# Patient Record
Sex: FEMALE | Race: WHITE | NOT HISPANIC OR LATINO | Employment: PART TIME | URBAN - METROPOLITAN AREA
[De-identification: names, ages, dates, MRNs, and addresses within clinical notes are randomized per-mention and may not be internally consistent; named-entity substitution may affect disease eponyms.]

---

## 2018-01-08 ENCOUNTER — OFFICE VISIT (OUTPATIENT)
Dept: MEDICAL GROUP | Facility: MEDICAL CENTER | Age: 48
End: 2018-01-08
Payer: COMMERCIAL

## 2018-01-08 VITALS
RESPIRATION RATE: 15 BRPM | OXYGEN SATURATION: 94 % | TEMPERATURE: 98.8 F | SYSTOLIC BLOOD PRESSURE: 140 MMHG | HEART RATE: 84 BPM | BODY MASS INDEX: 32.99 KG/M2 | WEIGHT: 198 LBS | DIASTOLIC BLOOD PRESSURE: 74 MMHG | HEIGHT: 65 IN

## 2018-01-08 DIAGNOSIS — Z13.6 SCREENING FOR CARDIOVASCULAR CONDITION: ICD-10-CM

## 2018-01-08 DIAGNOSIS — E55.9 VITAMIN D DEFICIENCY: ICD-10-CM

## 2018-01-08 DIAGNOSIS — L23.9 ALLERGIC ECZEMA: ICD-10-CM

## 2018-01-08 DIAGNOSIS — E03.8 OTHER SPECIFIED HYPOTHYROIDISM: ICD-10-CM

## 2018-01-08 DIAGNOSIS — G47.09 OTHER INSOMNIA: ICD-10-CM

## 2018-01-08 DIAGNOSIS — E66.9 OBESITY (BMI 30-39.9): ICD-10-CM

## 2018-01-08 DIAGNOSIS — J45.20 MILD INTERMITTENT ASTHMA, UNSPECIFIED WHETHER COMPLICATED: ICD-10-CM

## 2018-01-08 DIAGNOSIS — L65.9 ALOPECIA: ICD-10-CM

## 2018-01-08 PROCEDURE — 99204 OFFICE O/P NEW MOD 45 MIN: CPT | Performed by: NURSE PRACTITIONER

## 2018-01-08 RX ORDER — MOMETASONE FUROATE 1 MG/G
1 OINTMENT TOPICAL PRN
Refills: 1 | COMMUNITY
Start: 2017-12-14 | End: 2020-04-13

## 2018-01-08 RX ORDER — HYDROXYZINE HYDROCHLORIDE 25 MG/1
25 TABLET, FILM COATED ORAL NIGHTLY PRN
Qty: 90 TAB | Refills: 3 | Status: SHIPPED | OUTPATIENT
Start: 2018-01-08 | End: 2018-12-19

## 2018-01-08 RX ORDER — LEVOTHYROXINE SODIUM 0.07 MG/1
75 TABLET ORAL
Qty: 90 TAB | Refills: 3 | Status: SHIPPED | OUTPATIENT
Start: 2018-01-08 | End: 2018-09-16 | Stop reason: SDUPTHER

## 2018-01-08 RX ORDER — MONTELUKAST SODIUM 10 MG/1
10 TABLET ORAL DAILY
COMMUNITY
End: 2020-04-13

## 2018-01-08 RX ORDER — ALBUTEROL SULFATE 90 UG/1
1-2 AEROSOL, METERED RESPIRATORY (INHALATION) EVERY 4 HOURS PRN
Refills: 2 | COMMUNITY
Start: 2017-10-17 | End: 2018-11-16 | Stop reason: SDUPTHER

## 2018-01-08 ASSESSMENT — PATIENT HEALTH QUESTIONNAIRE - PHQ9: CLINICAL INTERPRETATION OF PHQ2 SCORE: 0

## 2018-01-08 NOTE — PROGRESS NOTES
"CC: establish and need thyroid medications      Alis Gonzalez is a 47 y.o. female here to establish care and to discuss the evaluation and management of:    1. 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 States that she has subsequently lost most of the hair on her head as well as her body and she does have to wear wigs. States that she did have a skin biopsy from a dermatology a few years ago at which she states that it may have been some sort of autoimmune disease. States that she had some labs done over 6 months ago however did not follow up with the results of these and is curious to see what these labs reveal. Denies any joint pain. Has not tried anything for her alopecia.    2.Hypothyroidism   Patient states that she has hypothyroidism. States that she takes Synthroid 75 micrograms brand for this. States she tolerates this well. States she hasn't had labs in more than 6 months.     3. Insomnia  Patient states that she suffers from insomnia. States that she takes hydroxyzine at night for this.    4. Asthma/allergies  Patient states that she suffers from \"really bad allergies\" which trigger her asthma. States that she is followed by [facility] in [place]. States that she was allergy tested and \"allergic to everything\". States that she's been on Singulair for the last year which has helped her extremely. States that she gets really bad eczema and dry skin all over her hands and arms and also her face. States she also feels that if she eats a modified diet such as appealing her diet she can have elimination of most of her symptoms. States that sometimes she uses her Ventolin inhaler maybe once maybe twice per month. Has not been hospitalized for any asthma attacks recently.    4. Vitamin D deficiency  Patient states that she has a vitamin D deficiency and has been taking 5000 international units of vitamin D daily.      ROS:  Denies any Headache, Blurred Vision, " Confusion Chest pain,  Shortness of breath,  Abdominal pain, Changes of bowel or bladder, Lower ext edema, Fevers, Nights sweats, Weight Changes, Focal weakness or numbness.  All other systems are negative.      Current Outpatient Prescriptions:   •  montelukast (SINGULAIR) 10 MG Tab, Take 10 mg by mouth every day., Disp: , Rfl:   •  hydrOXYzine HCl (ATARAX) 25 MG Tab, Take 1 Tab by mouth at bedtime as needed (sleep)., Disp: 90 Tab, Rfl: 3  •  levothyroxine (SYNTHROID) 75 MCG Tab, Take 1 Tab by mouth Every morning on an empty stomach., Disp: 90 Tab, Rfl: 3  •  NON SPECIFIED, Take 5,000 Int'l Units by mouth every day., Disp: , Rfl:   •  VENTOLIN  (90 Base) MCG/ACT Aero Soln inhalation aerosol, Inhale 1-2 Inhalation by mouth every four hours as needed., Disp: , Rfl: 2  •  mometasone (ELOCON) 0.1 % Ointment, Apply 1 Application to affected area(s) as needed., Disp: , Rfl: 1    Allergies   Allergen Reactions   • Azithromycin Rash     All over body   • Ciprofloxacin Rash     All over body   • Penicillins Rash     Rash         Past Medical History:   Diagnosis Date   • Alopecia 2015   • Asthma    • Thyroid disease      History reviewed. No pertinent surgical history.  Family History   Problem Relation Age of Onset   • Thyroid Mother      cancer-40's   • Cancer Father      skin-melanoma   • Hyperlipidemia Father    • Cancer Maternal Aunt      breast to brain     Social History     Social History   • Marital status:      Spouse name: N/A   • Number of children: N/A   • Years of education: N/A     Occupational History   • Not on file.     Social History Main Topics   • Smoking status: Never Smoker   • Smokeless tobacco: Never Used   • Alcohol use Yes      Comment: rarely   • Drug use: No   • Sexual activity: Yes     Partners: Male     Other Topics Concern   • Not on file     Social History Narrative   • No narrative on file       Objective:     Vitals: /74   Pulse 84   Temp 37.1 °C (98.8 °F)   Resp 15   " Ht 1.651 m (5' 5\")   Wt 89.8 kg (198 lb)   SpO2 94%   BMI 32.95 kg/m²      General: Alert, pleasant, NAD  HEENT:  Normocephalic.   Neck supple.  No thyromegaly or masses palpated. No cervical or supraclavicular lymphadenopathy.  Heart:  Regular rate and rhythm.  S1 and S2 normal.  No murmurs appreciated.  Respiratory:  Normal respiratory effort.  Clear to auscultation bilaterally.    Skin:  Warm, dry, rough, cracked,calloused bilateral hands mostly near metacarpal joints. Sporadic patches of dry erythematous papules on bilateral forearms.  Musculoskeletal:  Gait is normal.  Moves all extremities well.  Extremities:  No leg edema.  Neurological: No tremors, sensation grossly intact  Psych:  Affect/mood is normal, judgement is good, memory is intact, grooming is appropriate.      Assessment and Plan.   47 y.o. female to establish and discuss the following.    1. Alopecia  Chronic. Requesting labs from [facility] as this patient states she had a full workup with unknown results. Likely autoimmune. Discussed possibly referring to rheumatology pending results.    2. Other specified hypothyroidism  Chronic. Feeling euthyroid. Need labs. Continue Synthroid 75 µg daily.  - levothyroxine (SYNTHROID) 75 MCG Tab; Take 1 Tab by mouth Every morning on an empty stomach.  Dispense: 90 Tab; Refill: 3  - TSH; Future  - COMP METABOLIC PANEL; Future    3. Other insomnia  Stable. Discussed the importance of eliminating use of technology such as phones, computers, I-pads or TVs. Try to have the same routine of waking and sleeping every day. Avoid excessive caffeine, alcohol, large heavy meals at bedtime. Continue using hydroxyzine as needed for sleep.  - hydrOXYzine HCl (ATARAX) 25 MG Tab; Take 1 Tab by mouth at bedtime as needed (sleep).  Dispense: 90 Tab; Refill: 3  - COMP METABOLIC PANEL; Future    4. Vitamin D deficiency  Stable. Continue vitamin D 5000 units daily. Follow-up labs.  - VITAMIN D,25 HYDROXY; Future    5. Mild " intermittent Asthma, unspecified whether complicated  Stable. No recent asthma attacks. Continue to follow up with [facility], continue to take Singulair and avoid triggers.    6. Allergic Eczema  Chronic-continue to avoid food triggers and use mometasone cream as needed    7. Screening for cardiovascular condition  - LIPID PROFILE; Future    8. Obesity (BMI 30-39.9)  Chronic. Patient encouraged to reduce excess calorie consumption. Encouraged regular exercise. Discussed long term sequelae of obesity.   - Patient identified as having weight management issue.  Appropriate orders and counseling given.      Health Maintenance: Requesting labs from [facility], patient is to have a mammogram this Saturday. Patient had normal pap 2 years ago.    Return in about 2 weeks (around 1/22/2018) for Lab results.          Comfort GILLIS.

## 2018-01-08 NOTE — LETTER
[employer] [facility]  YAYO Burt.P.R.N.  [address] [facility] [address]  [place] [address]  Fax: [phone]   Authorization for Release/Disclosure of   Protected Health Information   Name: VOLODYMYR GONZALEZ : 1970 SSN: [id number]   Address: [facility] [place] [address] Phone:    [phone] (home)    I authorize the entity listed below to release/disclose the PHI below to:   [facility]/Comfort Grijalva, A.P.R.N. and Comfort Grijalva A.P.R.N.   Provider or Entity Name:     Address   City, State, Zip   Phone:      Fax:     Reason for request: continuity of care   Information to be released:    [  ] LAST COLONOSCOPY,  including any PATH REPORT and follow-up  [  ] LAST FIT/COLOGUARD RESULT [  ] LAST DEXA  [  ] LAST MAMMOGRAM  [  ] LAST PAP  [ x ] LAST LABS [  ] RETINA EXAM REPORT  [  ] IMMUNIZATION RECORDS  [  ] Release all info      [  ] Check here and initial the line next to each item to release ALL health information INCLUDING  _____ Care and treatment for drug and / or alcohol abuse  _____ HIV testing, infection status, or AIDS  _____ Genetic Testing    DATES OF SERVICE OR TIME PERIOD TO BE DISCLOSED: _____________  I understand and acknowledge that:  * This Authorization may be revoked at any time by you in writing, except if your health information has already been used or disclosed.  * Your health information that will be used or disclosed as a result of you signing this authorization could be re-disclosed by the recipient. If this occurs, your re-disclosed health information may no longer be protected by State or Federal laws.  * You may refuse to sign this Authorization. Your refusal will not affect your ability to obtain treatment.  * This Authorization becomes effective upon signing and will  on (date) __________.      If no date is indicated, this Authorization will  one (1) year from the signature date.    Name: Volodymyr Gonzalez    Signature:   Date:     2018            PLEASE FAX REQUESTED RECORDS BACK TO: [phone]

## 2018-01-12 ENCOUNTER — HOSPITAL ENCOUNTER (OUTPATIENT)
Dept: RADIOLOGY | Facility: MEDICAL CENTER | Age: 48
End: 2018-01-12

## 2018-01-13 ENCOUNTER — HOSPITAL ENCOUNTER (OUTPATIENT)
Dept: RADIOLOGY | Facility: MEDICAL CENTER | Age: 48
End: 2018-01-13
Attending: FAMILY MEDICINE
Payer: COMMERCIAL

## 2018-01-13 DIAGNOSIS — Z12.31 SCREENING MAMMOGRAM, ENCOUNTER FOR: ICD-10-CM

## 2018-01-13 PROCEDURE — 77067 SCR MAMMO BI INCL CAD: CPT

## 2018-01-19 ENCOUNTER — HOSPITAL ENCOUNTER (OUTPATIENT)
Dept: LAB | Facility: MEDICAL CENTER | Age: 48
End: 2018-01-19
Attending: NURSE PRACTITIONER
Payer: COMMERCIAL

## 2018-01-19 DIAGNOSIS — E03.8 OTHER SPECIFIED HYPOTHYROIDISM: ICD-10-CM

## 2018-01-19 DIAGNOSIS — Z13.6 SCREENING FOR CARDIOVASCULAR CONDITION: ICD-10-CM

## 2018-01-19 DIAGNOSIS — E55.9 VITAMIN D DEFICIENCY: ICD-10-CM

## 2018-01-19 DIAGNOSIS — G47.09 OTHER INSOMNIA: ICD-10-CM

## 2018-01-19 LAB
25(OH)D3 SERPL-MCNC: 39 NG/ML (ref 30–100)
ALBUMIN SERPL BCP-MCNC: 4.6 G/DL (ref 3.2–4.9)
ALBUMIN/GLOB SERPL: 1.6 G/DL
ALP SERPL-CCNC: 68 U/L (ref 30–99)
ALT SERPL-CCNC: 22 U/L (ref 2–50)
ANION GAP SERPL CALC-SCNC: 7 MMOL/L (ref 0–11.9)
AST SERPL-CCNC: 20 U/L (ref 12–45)
BILIRUB SERPL-MCNC: 0.5 MG/DL (ref 0.1–1.5)
BUN SERPL-MCNC: 11 MG/DL (ref 8–22)
CALCIUM SERPL-MCNC: 8.7 MG/DL (ref 8.5–10.5)
CHLORIDE SERPL-SCNC: 101 MMOL/L (ref 96–112)
CHOLEST SERPL-MCNC: 166 MG/DL (ref 100–199)
CO2 SERPL-SCNC: 26 MMOL/L (ref 20–33)
CREAT SERPL-MCNC: 0.54 MG/DL (ref 0.5–1.4)
GLOBULIN SER CALC-MCNC: 2.9 G/DL (ref 1.9–3.5)
GLUCOSE SERPL-MCNC: 93 MG/DL (ref 65–99)
HDLC SERPL-MCNC: 40 MG/DL
LDLC SERPL CALC-MCNC: 108 MG/DL
POTASSIUM SERPL-SCNC: 3.9 MMOL/L (ref 3.6–5.5)
PROT SERPL-MCNC: 7.5 G/DL (ref 6–8.2)
SODIUM SERPL-SCNC: 134 MMOL/L (ref 135–145)
TRIGL SERPL-MCNC: 88 MG/DL (ref 0–149)
TSH SERPL DL<=0.005 MIU/L-ACNC: 1.55 UIU/ML (ref 0.38–5.33)

## 2018-01-19 PROCEDURE — 36415 COLL VENOUS BLD VENIPUNCTURE: CPT

## 2018-01-19 PROCEDURE — 80061 LIPID PANEL: CPT

## 2018-01-19 PROCEDURE — 84443 ASSAY THYROID STIM HORMONE: CPT

## 2018-01-19 PROCEDURE — 82306 VITAMIN D 25 HYDROXY: CPT

## 2018-01-19 PROCEDURE — 80053 COMPREHEN METABOLIC PANEL: CPT

## 2018-01-30 ENCOUNTER — TELEPHONE (OUTPATIENT)
Dept: MEDICAL GROUP | Facility: MEDICAL CENTER | Age: 48
End: 2018-01-30

## 2018-01-30 ENCOUNTER — OFFICE VISIT (OUTPATIENT)
Dept: MEDICAL GROUP | Facility: MEDICAL CENTER | Age: 48
End: 2018-01-30
Payer: COMMERCIAL

## 2018-01-30 VITALS
HEART RATE: 77 BPM | WEIGHT: 203.4 LBS | TEMPERATURE: 98.1 F | BODY MASS INDEX: 33.89 KG/M2 | DIASTOLIC BLOOD PRESSURE: 74 MMHG | OXYGEN SATURATION: 96 % | SYSTOLIC BLOOD PRESSURE: 118 MMHG | RESPIRATION RATE: 17 BRPM | HEIGHT: 65 IN

## 2018-01-30 DIAGNOSIS — L23.9 ALLERGIC ECZEMA: ICD-10-CM

## 2018-01-30 DIAGNOSIS — Z91.018 FOOD ALLERGY: ICD-10-CM

## 2018-01-30 DIAGNOSIS — L65.9 ALOPECIA: ICD-10-CM

## 2018-01-30 PROCEDURE — 99213 OFFICE O/P EST LOW 20 MIN: CPT | Performed by: NURSE PRACTITIONER

## 2018-01-30 NOTE — PROGRESS NOTES
cc:  Alopecia/lab follow-up    Subjective:     HPI:     Alis Gonzalez is a 47 y.o. female here to discuss the evaluation and management of:    Patient is here today to review labs are requested from [facility] more than 6 months ago. Patient states that she's been suffering from alopecia for a few years and has been very difficult for her to cope with. States that she suffers from asthma and allergies as well. States she's been continuing to see [facility]. States that it can be hard to maintain a diet with all of her dietary restrictions. Has not seen a nutritionist. Is somewhat interested in following up with one. Patient is concerned that she has some sort of autoimmune disorder and that is why her hair has fallen out as well as having multiple environmental and food allergies. Patient did make a comment that when she was on a Yanira Kamar diet there was a lot of corn that she had including popcorn, in which she felt like there might have been some sort of correlation especially because at that time she did not know she was allergic to corn. Patient is requesting to follow up with a rheumatologist. Again denies any joint pain.        ROS:  Denies any Headache, Blurred Vision, Confusion Chest pain,  Shortness of breath,  Abdominal pain, Changes of bowel or bladder, Lower ext edema, Fevers, Nights sweats, Weight Changes, Focal weakness or numbness.  All other systems are negative.        Current Outpatient Prescriptions:   •  montelukast (SINGULAIR) 10 MG Tab, Take 10 mg by mouth every day., Disp: , Rfl:   •  VENTOLIN  (90 Base) MCG/ACT Aero Soln inhalation aerosol, Inhale 1-2 Inhalation by mouth every four hours as needed., Disp: , Rfl: 2  •  mometasone (ELOCON) 0.1 % Ointment, Apply 1 Application to affected area(s) as needed., Disp: , Rfl: 1  •  hydrOXYzine HCl (ATARAX) 25 MG Tab, Take 1 Tab by mouth at bedtime as needed (sleep)., Disp: 90 Tab, Rfl: 3  •  levothyroxine (SYNTHROID) 75 MCG Tab, Take 1 Tab by  "mouth Every morning on an empty stomach., Disp: 90 Tab, Rfl: 3  •  NON SPECIFIED, Take 5,000 Int'l Units by mouth every day., Disp: , Rfl:     Allergies   Allergen Reactions   • Azithromycin Rash     All over body   • Ciprofloxacin Rash     All over body   • Penicillins Rash     Rash         Past Medical History:   Diagnosis Date   • Alopecia 2015   • Asthma    • Thyroid disease      No past surgical history on file.  Family History   Problem Relation Age of Onset   • Thyroid Mother      cancer-40's   • Cancer Father      skin-melanoma   • Hyperlipidemia Father    • Cancer Maternal Aunt      breast to brain   • Breast Cancer Maternal Aunt      Social History     Social History   • Marital status:      Spouse name: N/A   • Number of children: N/A   • Years of education: N/A     Occupational History   • Not on file.     Social History Main Topics   • Smoking status: Never Smoker   • Smokeless tobacco: Never Used   • Alcohol use Yes      Comment: rarely   • Drug use: No   • Sexual activity: Yes     Partners: Male     Other Topics Concern   • Not on file     Social History Narrative   • No narrative on file       Objective:     Vitals: /74   Pulse 77   Temp 36.7 °C (98.1 °F)   Resp 17   Ht 1.651 m (5' 5\")   Wt 92.3 kg (203 lb 6.4 oz)   SpO2 96%   BMI 33.85 kg/m²    General: Alert, pleasant, NAD  HEENT: Normocephalic.  Patient is wearing a wig  Heart: Regular rate and rhythm.  S1 and S2 normal.  No murmurs appreciated.  Respiratory: Normal respiratory effort.  Clear to auscultation bilaterally.  Skin: Warm, dry, no rashes.  Musculoskeletal: Gait is normal.  Moves all extremities well.  Extremities: No leg edema.    Neurological: No tremors, sensation grossly intact  Psych:  Affect/mood is normal, judgement is good, memory is intact, grooming is appropriate. Tearful when talking about her hair loss    Assessment/Plan:     Alis was seen today for alopecia and labs only.    Diagnoses and all orders for " this visit:    Alopecia  Allergic eczema   Chronic. It has been several years since the patient was told she had alopecia and possible an autoimmune disease. She states she was tested by dermatology stating that it was alopecia universalis. Has not wanted to follow up with rheumatology as she was not ready to deal with a diagnosis and disease. Patient is pretty emotional about her hair loss. Patient did receive labs July 2017 from a previous provider at [facility] which were pulled up  and reviewed. Her ESTEBAN with Reflex was negative her Western Sed Rate was 2 and her C-reactive Protein was 7.5- somewhat elevated. Again denies any joint pain. Will refer to rheumatology for further recommendations. Autoimmune versus severe allergic insult/reaction.  -     REFERRAL TO RHEUMATOLOGY       Health care Maintenance: Pt will notify me if she wants to be referred to a nutritionist to help improve her understanding of her dietary restrictions.    No Follow-up on file.          Comfort GILLIS.

## 2018-01-30 NOTE — TELEPHONE ENCOUNTER
1. Caller Name: Alis Gonzalez                                         Call Back Number: [phone] (home)       Patient approves a detailed voicemail message: yes    Pt would like a referral to a nutritionist

## 2018-01-31 PROBLEM — J45.20 MILD INTERMITTENT ASTHMA: Status: ACTIVE | Noted: 2018-01-31

## 2018-02-14 ENCOUNTER — NON-PROVIDER VISIT (OUTPATIENT)
Dept: HEALTH INFORMATION MANAGEMENT | Facility: MEDICAL CENTER | Age: 48
End: 2018-02-14
Payer: COMMERCIAL

## 2018-02-14 VITALS — WEIGHT: 202.5 LBS | BODY MASS INDEX: 33.74 KG/M2 | HEIGHT: 65 IN

## 2018-02-14 DIAGNOSIS — L23.9 ALLERGIC ECZEMA: ICD-10-CM

## 2018-02-14 DIAGNOSIS — Z91.018 FOOD ALLERGY: ICD-10-CM

## 2018-02-14 PROCEDURE — 97802 MEDICAL NUTRITION INDIV IN: CPT | Performed by: DIETITIAN, REGISTERED

## 2018-02-14 NOTE — PROGRESS NOTES
"2/14/2018    RALPH Burt  47 y.o.   Time in/out: 10:15-11:04am    Anthropometrics/Objective  Vitals:    02/14/18 1133   Weight: 91.9 kg (202 lb 8 oz)   Height: 1.651 m (5' 5\")       Body mass index is 33.7 kg/m².      See comprehensive patient history form for further information     Subjective:  - pt diagnosed with alopecia universalis, eczema  - here to discuss \"inflammation\" and food allergies  - has seen an allergist for skin and blood tests  - not sure how to find meals and foods that work with her allergy test results    Nutrition Diagnosis (PES Statement)  Problem (Nutrition diagnosis)  Other: Food and nutrition related knowledge deficit     Etiology(Addresses the cause,contributing factors)  Other: food allergies and autoimmune diagnosis    Signs/Symptoms (Address observations and stated info: subjective and objective data)  Client history of inability to follow dietary recommendations from allergy test results, elevated CRP    Client history:  Condition(s) associated with a diagnosis or treatment, eczema, alopecia universalis.    Biochemical data, medical test and procedures  No results found for: HBA1C@  No results found for: POCGLUCOSE  Lab Results   Component Value Date/Time    CHOLSTRLTOT 166 01/19/2018 09:21 AM     (H) 01/19/2018 09:21 AM    HDL 40 01/19/2018 09:21 AM    TRIGLYCERIDE 88 01/19/2018 09:21 AM         Nutrition Intervention    Meal and Snack  Recommend a general/healthful diet, low in refined/processed carbohydrates and avoiding main foods on allergy results - gluten, soy, corn.    Comprehensive Nutrition education Instruction or training leading to in-depth nutrition related knowledge about: Eating out, Fast food, Menu Planning, Sweets and alcohol in moderation, Label Reading and Handouts provided regarding topics discussed    Monitoring & Evaluation Plan    Behavioral-Environmental:  Food/Nutrition knowledge: read labels and identify wheat/gluten, soy, corn " foods and ingredients    Food / Nutrient Intake:  Follow wheat, corn and soy free diet  Follow plate method for an overall healthy diet     Assessment Notes: Pt is here today to learn about how she can follow the results to her allergy test. Eliminating gluten and soy has been very difficult for her as she isn't sure what she can or cannot eat and what to prepare for her family. Reviewed gluten-free handout and recommendations and helped her work through some meal ideas. We reviewed what a general healthy diet looks like incorporating fruits, vegetables, lean protein, starches and oils within her dietary limits. She has a high intake of processed foods and will benefit from decreasing and/or eliminating the frequency of this in her diet. She is going to start working on identifying her allergy foods and ingredients, reading labels and eating out less.    Follow up: 1 month

## 2018-02-27 ENCOUNTER — OFFICE VISIT (OUTPATIENT)
Dept: MEDICAL GROUP | Facility: MEDICAL CENTER | Age: 48
End: 2018-02-27
Payer: COMMERCIAL

## 2018-02-27 VITALS
HEIGHT: 65 IN | OXYGEN SATURATION: 95 % | BODY MASS INDEX: 33.49 KG/M2 | DIASTOLIC BLOOD PRESSURE: 94 MMHG | WEIGHT: 201 LBS | TEMPERATURE: 98.6 F | HEART RATE: 74 BPM | SYSTOLIC BLOOD PRESSURE: 150 MMHG

## 2018-02-27 DIAGNOSIS — J45.20 MILD INTERMITTENT ASTHMA, UNSPECIFIED WHETHER COMPLICATED: ICD-10-CM

## 2018-02-27 DIAGNOSIS — L23.9 ALLERGIC ECZEMA: ICD-10-CM

## 2018-02-27 DIAGNOSIS — R03.0 ELEVATED BLOOD PRESSURE READING: ICD-10-CM

## 2018-02-27 PROCEDURE — 99213 OFFICE O/P EST LOW 20 MIN: CPT | Performed by: FAMILY MEDICINE

## 2018-02-27 RX ORDER — METHYLPREDNISOLONE 4 MG/1
TABLET ORAL
Qty: 21 TAB | Refills: 0 | Status: SHIPPED | OUTPATIENT
Start: 2018-02-27 | End: 2018-04-19

## 2018-02-28 NOTE — PROGRESS NOTES
cc: Eczema    Subjective:     Alis Gonzalez is a 47 y.o. female presenting with eczema. She has a long-standing history of severe eczema throughout her body. She has not had any issues for the past 10 months, however for the past month, her eczema has flared up. Has diffuse, red, itchy rash on her entire body. She usually uses mometasone cream with good management of symptoms, but the rash is much too extensive. She follows regularly with an allergist, who usually gives her steroids during these flares. Her last course was over one year ago. She was unable to get an appointment with her allergist soon as her insurance changed and she needed a new referral. Denies any new soaps, lotions, detergents, medications.    Review of systems:  See above.        Current Outpatient Prescriptions:   •  MethylPREDNISolone (MEDROL DOSEPAK) 4 MG Tablet Therapy Pack, As directed on the packaging label., Disp: 21 Tab, Rfl: 0  •  Misc. Devices Misc, One blood pressure monitor and cuff, Disp: 1 Units, Rfl: 0  •  montelukast (SINGULAIR) 10 MG Tab, Take 10 mg by mouth every day., Disp: , Rfl:   •  VENTOLIN  (90 Base) MCG/ACT Aero Soln inhalation aerosol, Inhale 1-2 Inhalation by mouth every four hours as needed., Disp: , Rfl: 2  •  mometasone (ELOCON) 0.1 % Ointment, Apply 1 Application to affected area(s) as needed., Disp: , Rfl: 1  •  hydrOXYzine HCl (ATARAX) 25 MG Tab, Take 1 Tab by mouth at bedtime as needed (sleep)., Disp: 90 Tab, Rfl: 3  •  levothyroxine (SYNTHROID) 75 MCG Tab, Take 1 Tab by mouth Every morning on an empty stomach., Disp: 90 Tab, Rfl: 3  •  NON SPECIFIED, Take 5,000 Int'l Units by mouth every day., Disp: , Rfl:     Allergies   Allergen Reactions   • Azithromycin Rash     All over body   • Ciprofloxacin Rash     All over body   • Penicillins Rash     Rash         Past Medical History:   Diagnosis Date   • Alopecia 2015   • Asthma    • Thyroid disease      History reviewed. No pertinent surgical history.  Family  "History   Problem Relation Age of Onset   • Thyroid Mother      cancer-40's   • Cancer Father      skin-melanoma   • Hyperlipidemia Father    • Cancer Maternal Aunt      breast to brain   • Breast Cancer Maternal Aunt      Social History     Social History   • Marital status:      Spouse name: N/A   • Number of children: N/A   • Years of education: N/A     Occupational History   • Not on file.     Social History Main Topics   • Smoking status: Never Smoker   • Smokeless tobacco: Never Used   • Alcohol use Yes      Comment: rarely   • Drug use: No   • Sexual activity: Yes     Partners: Male     Other Topics Concern   • Not on file     Social History Narrative   • No narrative on file       Objective:     Vitals: /94   Pulse 74   Temp 37 °C (98.6 °F)   Ht 1.651 m (5' 5\")   Wt 91.2 kg (201 lb)   SpO2 95%   BMI 33.45 kg/m²   General: Alert, pleasant, NAD  HEENT: Normocephalic.    Skin: Warm, dry. Diffuse, erythematous, scaly rash on her upper extremities, trunk, chest, neck area   Psych:  Affect/mood is normal, judgement is good, memory is intact, grooming is appropriate.    Assessment/Plan:     Alis was seen today for eczema.    Diagnoses and all orders for this visit:    Allergic eczema  Mild intermittent asthma, unspecified whether complicated  Discussed risks of steroid use. Patient was willing to proceed. Prescription sent to pharmacy, referral to her allergist placed  -     REFERRAL TO ALLERGY  -     MethylPREDNISolone (MEDROL DOSEPAK) 4 MG Tablet Therapy Pack; As directed on the packaging label.    Elevated blood pressure reading  Blood pressure was elevated today. She does admit to feeling quite anxious today at her office visit. Recommended that she check her blood pressures at home, and if they remain over 140/90, she'll make another appointment to discuss  -     Misc. Devices Misc; One blood pressure monitor and cuff      "

## 2018-03-09 DIAGNOSIS — L65.9 ALOPECIA: ICD-10-CM

## 2018-04-19 ENCOUNTER — OFFICE VISIT (OUTPATIENT)
Dept: MEDICAL GROUP | Facility: MEDICAL CENTER | Age: 48
End: 2018-04-19
Payer: COMMERCIAL

## 2018-04-19 VITALS
TEMPERATURE: 97.5 F | HEART RATE: 90 BPM | DIASTOLIC BLOOD PRESSURE: 72 MMHG | HEIGHT: 65 IN | SYSTOLIC BLOOD PRESSURE: 130 MMHG | RESPIRATION RATE: 14 BRPM | WEIGHT: 203.4 LBS | OXYGEN SATURATION: 97 % | BODY MASS INDEX: 33.89 KG/M2

## 2018-04-19 DIAGNOSIS — L65.9 ALOPECIA: Chronic | ICD-10-CM

## 2018-04-19 DIAGNOSIS — R14.0 ABDOMINAL DISTENSION: ICD-10-CM

## 2018-04-19 DIAGNOSIS — L23.9 ALLERGIC ECZEMA: Chronic | ICD-10-CM

## 2018-04-19 PROBLEM — E55.9 VITAMIN D INSUFFICIENCY: Chronic | Status: ACTIVE | Noted: 2018-01-08

## 2018-04-19 PROBLEM — E03.8 OTHER SPECIFIED HYPOTHYROIDISM: Chronic | Status: ACTIVE | Noted: 2018-01-08

## 2018-04-19 PROCEDURE — 99214 OFFICE O/P EST MOD 30 MIN: CPT | Performed by: NURSE PRACTITIONER

## 2018-04-19 RX ORDER — PREDNISONE 10 MG/1
TABLET ORAL
Qty: 15 TAB | Refills: 0 | Status: SHIPPED | OUTPATIENT
Start: 2018-04-19 | End: 2018-06-06

## 2018-04-20 RX ORDER — CYCLOSPORINE 0.5 MG/ML
1 EMULSION OPHTHALMIC DAILY
COMMUNITY
Start: 2018-02-04 | End: 2018-06-06

## 2018-04-20 NOTE — PROGRESS NOTES
"cc:  Rash, abdominal distention    Subjective:     HPI:     Alis Gonzalez is a 47 y.o. female here to discuss the evaluation and management of:    1. Allergic eczema  \"Rash has not gone away.\" Patient states that she was recently given some antibiotics for her rash that she had from her dermatologist. Patient states that the Bactrim seemed to give her hives and she was allergic to it. She then took some steroids which seemed to help calm down her rash however it was about 75% away and then it is now coming back. Patient states that she still continues to take the Singulair and use the cream however she is concerned that her rash is coming back in full force. i states that it s all over her abdomen and chest back arms and little bit on her legs. States it is itchy and burning.    2. Alopecia  Patient states that she does have an appointment with the endocrinologist in a few weeks. She is hoping to find out why she has lost her hair and has rashes and eczema everywhere over the last 3 years.    3. Abdominal distension  Patient states that her abdomen has been feeling distended, bloated and full for a week or so. Denies any nausea, vomiting, diarrhea, fever or chills. Patient does state that there are some epigastric pain when pressing hard.        ROS:  Denies any Headache, Blurred Vision, Confusion Chest pain,  Shortness of breath, Changes of bowel or bladder, Lower ext edema, Fevers, Nights sweats, Weight Changes, Focal weakness or numbness.  All other systems are negative. Rash, abdominal distention, itching , epigastric pain        Current Outpatient Prescriptions:   •  predniSONE (DELTASONE) 10 MG Tab, Take 20mg daily x 4 days then take 10mg daily for 4 days then take 5 mg daily for 4 days then take 2.5mg daily x 4 days then stop., Disp: 15 Tab, Rfl: 0  •  Misc. Devices Misc, One blood pressure monitor and cuff, Disp: 1 Units, Rfl: 0  •  montelukast (SINGULAIR) 10 MG Tab, Take 10 mg by mouth every day., Disp: , Rfl: " "  •  VENTOLIN  (90 Base) MCG/ACT Aero Soln inhalation aerosol, Inhale 1-2 Inhalation by mouth every four hours as needed., Disp: , Rfl: 2  •  mometasone (ELOCON) 0.1 % Ointment, Apply 1 Application to affected area(s) as needed., Disp: , Rfl: 1  •  hydrOXYzine HCl (ATARAX) 25 MG Tab, Take 1 Tab by mouth at bedtime as needed (sleep)., Disp: 90 Tab, Rfl: 3  •  levothyroxine (SYNTHROID) 75 MCG Tab, Take 1 Tab by mouth Every morning on an empty stomach., Disp: 90 Tab, Rfl: 3  •  NON SPECIFIED, Take 5,000 Int'l Units by mouth every day., Disp: , Rfl:     Allergies   Allergen Reactions   • Azithromycin Rash     All over body   • Ciprofloxacin Rash     All over body   • Penicillins Rash     Rash         Past Medical History:   Diagnosis Date   • Alopecia 2015   • Asthma    • Thyroid disease      History reviewed. No pertinent surgical history.  Family History   Problem Relation Age of Onset   • Thyroid Mother      cancer-40's   • Cancer Father      skin-melanoma   • Hyperlipidemia Father    • Cancer Maternal Aunt      breast to brain   • Breast Cancer Maternal Aunt      Social History     Social History   • Marital status:      Spouse name: N/A   • Number of children: N/A   • Years of education: N/A     Occupational History   • Not on file.     Social History Main Topics   • Smoking status: Never Smoker   • Smokeless tobacco: Never Used   • Alcohol use Yes      Comment: rarely   • Drug use: No   • Sexual activity: Yes     Partners: Male     Other Topics Concern   • Not on file     Social History Narrative   • No narrative on file       Objective:     Vitals: /72   Pulse 90   Temp 36.4 °C (97.5 °F)   Resp 14   Ht 1.651 m (5' 5\")   Wt 92.3 kg (203 lb 6.4 oz)   SpO2 97%   BMI 33.85 kg/m²    General: Alert, pleasant, NAD  HEENT: Normocephalic. Neck supple.  No thyromegaly or masses palpated. No cervical or supraclavicular lymphadenopathy.  Heart: Regular rate and rhythm.  S1 and S2 normal.  No " murmurs appreciated.  Respiratory: Normal respiratory effort.  Clear to auscultation bilaterally.  Abdomen: Distended, tympanic sound, tender in the epigastric area hernias, no hepatosplenomegaly, no hernias  Skin: Warm, dry. Diffuse rash on entire body, raised confluent erythematous rash all over her abdomen, not under breasts, onher  back scattered on her arms and legs.  Musculoskeletal: Gait is normal.  Moves all extremities well.  Extremities: No leg edema.   Neurological: No tremors, sensation grossly intact  Psych:  Affect/mood is normal, judgement is good, memory is intact, grooming is appropriate.    Assessment/Plan:     Alis was seen today for rash.    Diagnoses and all orders for this visit:    Allergic eczema  Patient states that her rash has not fully gone away. She did not take the steroids that were prescribed to her on the office visit on February 27. Patient states that she saw her dermatologist and was given a tapered dose of steroids and completed that dose. Patient states the steroids did help her however did not take the rash away 100%. Denies any chest pain, shortness of breath, dizziness, heart palpitations. States she's been trying to watch her diet and avoid all of her allergens to lessen her skin eruptions. Eager to have appointment with the endocrinologist to find out any other underlying diagnosis that can be causing all this for her. Would like to try a low-dose steroid again to see if this could help reduce her symptoms of her rash as it is causing her great discomfort with itching and burning. Reviewed side effects and medication implication.  -     predniSONE (DELTASONE) 10 MG Tab; Take 20mg daily x 4 days then take 10mg daily for 4 days then take 5 mg daily for 4 days then take 2.5mg daily x 4 days then stop.    Alopecia  Will await follow-up with endocrinologist for any further workup.    Abdominal distension  Patient does have distended, rounded abdomen with mild epigastric pain on  palpation. Patient denies any nausea, vomiting or diarrhea. Concern for an inflammatory process.    -     CT-ABDOMEN-PELVIS WITH; Future         Health care Maintenance: Blood pressure has improved since last visit.    Return if symptoms worsen or fail to improve.          Comfort GILLIS.

## 2018-04-22 ENCOUNTER — HOSPITAL ENCOUNTER (OUTPATIENT)
Dept: RADIOLOGY | Facility: MEDICAL CENTER | Age: 48
End: 2018-04-22
Attending: NURSE PRACTITIONER
Payer: COMMERCIAL

## 2018-04-22 DIAGNOSIS — R14.0 ABDOMINAL DISTENSION: ICD-10-CM

## 2018-04-22 PROCEDURE — 74177 CT ABD & PELVIS W/CONTRAST: CPT

## 2018-04-22 PROCEDURE — 700117 HCHG RX CONTRAST REV CODE 255: Performed by: NURSE PRACTITIONER

## 2018-04-22 RX ADMIN — IOHEXOL 100 ML: 350 INJECTION, SOLUTION INTRAVENOUS at 14:31

## 2018-04-22 RX ADMIN — IOHEXOL 50 ML: 240 INJECTION, SOLUTION INTRATHECAL; INTRAVASCULAR; INTRAVENOUS; ORAL at 12:48

## 2018-04-26 DIAGNOSIS — R93.89 ABNORMAL CT SCAN: ICD-10-CM

## 2018-05-02 ENCOUNTER — TELEPHONE (OUTPATIENT)
Dept: MEDICAL GROUP | Facility: MEDICAL CENTER | Age: 48
End: 2018-05-02

## 2018-05-02 DIAGNOSIS — L65.9 ALOPECIA: Chronic | ICD-10-CM

## 2018-05-02 DIAGNOSIS — R21 RASH: ICD-10-CM

## 2018-05-02 DIAGNOSIS — L23.9 ALLERGIC ECZEMA: Chronic | ICD-10-CM

## 2018-05-02 RX ORDER — PREDNISONE 20 MG/1
TABLET ORAL
Qty: 30 TAB | Refills: 0 | Status: SHIPPED | OUTPATIENT
Start: 2018-05-02 | End: 2018-06-06

## 2018-05-02 RX ORDER — PREDNISONE 10 MG/1
10 TABLET ORAL DAILY
Qty: 30 TAB | Refills: 1 | Status: SHIPPED | OUTPATIENT
Start: 2018-05-02 | End: 2018-06-06

## 2018-05-15 ENCOUNTER — APPOINTMENT (OUTPATIENT)
Dept: RADIOLOGY | Facility: MEDICAL CENTER | Age: 48
End: 2018-05-15
Attending: NURSE PRACTITIONER
Payer: COMMERCIAL

## 2018-05-25 ENCOUNTER — HOSPITAL ENCOUNTER (OUTPATIENT)
Dept: LAB | Facility: MEDICAL CENTER | Age: 48
End: 2018-05-25
Attending: NURSE PRACTITIONER
Payer: COMMERCIAL

## 2018-05-25 DIAGNOSIS — L65.9 ALOPECIA: Chronic | ICD-10-CM

## 2018-05-25 DIAGNOSIS — L23.9 ALLERGIC ECZEMA: Chronic | ICD-10-CM

## 2018-05-25 LAB
CRP SERPL HS-MCNC: 4 MG/L (ref 0–7.5)
ERYTHROCYTE [DISTWIDTH] IN BLOOD BY AUTOMATED COUNT: 45.2 FL (ref 35.9–50)
ERYTHROCYTE [SEDIMENTATION RATE] IN BLOOD BY WESTERGREN METHOD: 2 MM/HOUR (ref 0–20)
HCT VFR BLD AUTO: 45.9 % (ref 37–47)
HGB BLD-MCNC: 14.9 G/DL (ref 12–16)
IRON SATN MFR SERPL: 25 % (ref 15–55)
IRON SERPL-MCNC: 86 UG/DL (ref 40–170)
MCH RBC QN AUTO: 30.5 PG (ref 27–33)
MCHC RBC AUTO-ENTMCNC: 32.5 G/DL (ref 33.6–35)
MCV RBC AUTO: 94.1 FL (ref 81.4–97.8)
PLATELET # BLD AUTO: 522 K/UL (ref 164–446)
PMV BLD AUTO: 9.3 FL (ref 9–12.9)
RBC # BLD AUTO: 4.88 M/UL (ref 4.2–5.4)
TIBC SERPL-MCNC: 342 UG/DL (ref 250–450)
WBC # BLD AUTO: 12.3 K/UL (ref 4.8–10.8)

## 2018-05-25 PROCEDURE — 83550 IRON BINDING TEST: CPT

## 2018-05-25 PROCEDURE — 86141 C-REACTIVE PROTEIN HS: CPT

## 2018-05-25 PROCEDURE — 36415 COLL VENOUS BLD VENIPUNCTURE: CPT

## 2018-05-25 PROCEDURE — 86225 DNA ANTIBODY NATIVE: CPT

## 2018-05-25 PROCEDURE — 85027 COMPLETE CBC AUTOMATED: CPT

## 2018-05-25 PROCEDURE — 83540 ASSAY OF IRON: CPT

## 2018-05-25 PROCEDURE — 86038 ANTINUCLEAR ANTIBODIES: CPT

## 2018-05-25 PROCEDURE — 85652 RBC SED RATE AUTOMATED: CPT

## 2018-05-25 PROCEDURE — 86235 NUCLEAR ANTIGEN ANTIBODY: CPT

## 2018-05-27 LAB — NUCLEAR IGG SER QL IA: NORMAL

## 2018-05-30 ENCOUNTER — TELEPHONE (OUTPATIENT)
Dept: MEDICAL GROUP | Facility: MEDICAL CENTER | Age: 48
End: 2018-05-30

## 2018-05-30 RX ORDER — LEVOFLOXACIN 500 MG/1
500 TABLET, FILM COATED ORAL DAILY
Qty: 7 TAB | Refills: 0 | Status: SHIPPED | OUTPATIENT
Start: 2018-05-30 | End: 2018-06-06

## 2018-05-30 NOTE — TELEPHONE ENCOUNTER
1. Caller Name: Alis                                     Call Back Number: [phone]      Patient approves a detailed voicemail message: yes    Pt called stating that she just made a mychart and would like for you to release her labs to it.  Thank you!

## 2018-06-06 ENCOUNTER — OFFICE VISIT (OUTPATIENT)
Dept: ENDOCRINOLOGY | Facility: MEDICAL CENTER | Age: 48
End: 2018-06-06
Payer: COMMERCIAL

## 2018-06-06 VITALS
SYSTOLIC BLOOD PRESSURE: 131 MMHG | WEIGHT: 186 LBS | DIASTOLIC BLOOD PRESSURE: 90 MMHG | HEIGHT: 65 IN | OXYGEN SATURATION: 94 % | BODY MASS INDEX: 30.99 KG/M2 | HEART RATE: 79 BPM

## 2018-06-06 DIAGNOSIS — R21 RASH: ICD-10-CM

## 2018-06-06 DIAGNOSIS — E03.9 ACQUIRED HYPOTHYROIDISM: ICD-10-CM

## 2018-06-06 DIAGNOSIS — Z80.8 FAMILY HISTORY OF THYROID CANCER: ICD-10-CM

## 2018-06-06 PROCEDURE — 99244 OFF/OP CNSLTJ NEW/EST MOD 40: CPT | Performed by: INTERNAL MEDICINE

## 2018-06-06 NOTE — PROGRESS NOTES
New Patient Consult Note  Primary care provider: RALPH Burt    Reason for consult: Rash, hypothyroidism, alopecia    HPI:  Alis Gonzalez is a 47 y.o. old patient who comes in today for evaluation of above issues. Her main complaint at this time his rash. She noticed diffuse rash almost all over her body about 3 months ago. Most prominent location is upper chest, abdomen, arms, hands, legs. The rashes associated with form feeling. No significant issues with itching. No fevers or chills. She has tried 3 short courses of prednisone without any significant improvement. She has a referral to dermatologist, no appointment scheduled yet. She tells me today that her impression was that she is seeing me for evaluation of this rash, I did advise her that I am no expert on rash, and that I would highly recommend evaluation by a dermatologist. She also has alopecia, complete diffuse scalp hair loss, she wears a wig, this happened a few years ago. She has hypothyroidism as well. She was diagnosed with hypothyroidism about 4 years ago. She thinks her TSH was not significantly elevated at that time. She also thinks that she had negative thyroid peroxidase antibody in the past. She denies family history of Hashimoto's thyroiditis. She has family history of thyroid cancer, her mother had thyroid cancer in her late 40s, she underwent total thyroidectomy and radioactive iodine ablation. She has never been diagnosed with thyroid nodules or masses. She denies difficulty swallowing or breathing.    ROS:  Constitutional: Positive for weight loss  Skin: Positive for rash and alopecia  Endo: No heat or cold intolerance  All other systems were reviewed and were negative.    Past Medical History:  Patient Active Problem List    Diagnosis Date Noted   • Elevated blood pressure reading 02/27/2018   • Mild intermittent asthma 01/31/2018   • Other insomnia 01/08/2018   • Other specified hypothyroidism 01/08/2018   • Vitamin D  insufficiency 01/08/2018   • Obesity (BMI 30-39.9) 01/08/2018   • Allergic eczema 01/08/2018   • Asthma    • Alopecia 05/27/2016       Past Surgical History:  History reviewed. No pertinent surgical history.    Allergies:  Azithromycin; Ciprofloxacin; and Penicillins    Social History:  Social History     Social History   • Marital status:      Spouse name: N/A   • Number of children: N/A   • Years of education: N/A     Occupational History   • Not on file.     Social History Main Topics   • Smoking status: Never Smoker   • Smokeless tobacco: Never Used   • Alcohol use Yes      Comment: rarely   • Drug use: No   • Sexual activity: Yes     Partners: Male     Other Topics Concern   • Not on file     Social History Narrative   • No narrative on file       Family History:  Family History   Problem Relation Age of Onset   • Thyroid Mother      cancer-40's   • Cancer Father      skin-melanoma   • Hyperlipidemia Father    • Cancer Maternal Aunt      breast to brain   • Breast Cancer Maternal Aunt        Medications:    Current Outpatient Prescriptions:   •  vitamin D (CHOLECALCIFEROL) 1000 UNIT Tab, Take 4 Tabs by mouth every day., Disp: 60 Tab, Rfl:   •  montelukast (SINGULAIR) 10 MG Tab, Take 10 mg by mouth every day., Disp: , Rfl:   •  VENTOLIN  (90 Base) MCG/ACT Aero Soln inhalation aerosol, Inhale 1-2 Inhalation by mouth every four hours as needed., Disp: , Rfl: 2  •  levothyroxine (SYNTHROID) 75 MCG Tab, Take 1 Tab by mouth Every morning on an empty stomach., Disp: 90 Tab, Rfl: 3  •  Misc. Devices Misc, One blood pressure monitor and cuff, Disp: 1 Units, Rfl: 0  •  mometasone (ELOCON) 0.1 % Ointment, Apply 1 Application to affected area(s) as needed., Disp: , Rfl: 1  •  hydrOXYzine HCl (ATARAX) 25 MG Tab, Take 1 Tab by mouth at bedtime as needed (sleep)., Disp: 90 Tab, Rfl: 3  •  NON SPECIFIED, Take 5,000 Int'l Units by mouth every day., Disp: , Rfl:     Physical Examination:  Vital signs: /90    "Pulse 79   Ht 1.651 m (5' 5\")   Wt 84.4 kg (186 lb)   SpO2 94%   BMI 30.95 kg/m²   General: No apparent distress, cooperative  Eyes: No scleral icterus or discharge  ENMT: Normal on external inspection of nose, lips  Neck: No abnormal masses on inspection  Resp: Normal effort, clear to auscultation bilaterally   CVS: Regular rate and rhythm, S1 S2 normal, no murmur   Extremities: No edema  Neuro: Alert and oriented  Skin: Diffuse rash over her upper chest, arms, hands  Psych: Normal mood and affect    Assessment and Plan:    1. Rash  · Advised to call [facility] dermatology clinic to inquire about appointment, referral was sent last month    2. Acquired hypothyroidism  · For now she will continue Levothroxine 75 µg daily  · She is interested in getting off levothyroxine, we will check TPO antibody, if TPO antibody is negative we will lower the dose of levothyroxine to 75 µg 5 days a week for a month, and then 75 µg 3 days a week for a month and then discontinue; we will check TSH and free T4 every 4 weeks during this taper off to ensure she maintains normal thyroid hormone levels  - TSH; Standing  - FREE THYROXINE; Standing  - THYROID PEROXIDASE  (TPO) AB; Future  - US-SOFT TISSUES OF HEAD - NECK; Future    3. Family history of thyroid cancer  - US-SOFT TISSUES OF HEAD - NECK; Future    Return in about 6 months (around 12/6/2018).    Thank you for allowing me to participate in the care of this patient.    Richie Boone M.D.  06/06/18    CC:   Comfort Grijalva, A.P.R.N.    This note was created using voice recognition software (Dragon). The accuracy of the dictation is limited by the abilities of the software. I have reviewed the note prior to signing, however some errors in grammar and context are still possible. If you have any questions related to this note please do not hesitate to contact our office.     "

## 2018-06-12 ENCOUNTER — OFFICE VISIT (OUTPATIENT)
Dept: DERMATOLOGY | Facility: IMAGING CENTER | Age: 48
End: 2018-06-12
Payer: COMMERCIAL

## 2018-06-12 ENCOUNTER — HOSPITAL ENCOUNTER (OUTPATIENT)
Facility: MEDICAL CENTER | Age: 48
End: 2018-06-12
Attending: DERMATOLOGY
Payer: COMMERCIAL

## 2018-06-12 VITALS — TEMPERATURE: 99.2 F | BODY MASS INDEX: 30.99 KG/M2 | WEIGHT: 186 LBS | HEIGHT: 65 IN

## 2018-06-12 DIAGNOSIS — L98.9 DISEASE OF SKIN AND SUBCUTANEOUS TISSUE: ICD-10-CM

## 2018-06-12 PROCEDURE — 99203 OFFICE O/P NEW LOW 30 MIN: CPT | Mod: 25 | Performed by: DERMATOLOGY

## 2018-06-12 PROCEDURE — 11100 PR BIOPSY OF SKIN LESION: CPT | Performed by: DERMATOLOGY

## 2018-06-12 PROCEDURE — 88305 TISSUE EXAM BY PATHOLOGIST: CPT

## 2018-06-12 PROCEDURE — 88312 SPECIAL STAINS GROUP 1: CPT

## 2018-06-12 RX ORDER — BETAMETHASONE DIPROPIONATE 0.05 %
1 OINTMENT (GRAM) TOPICAL 2 TIMES DAILY
Qty: 90 G | Refills: 4 | Status: SHIPPED | OUTPATIENT
Start: 2018-06-12 | End: 2020-04-13

## 2018-06-12 NOTE — PROGRESS NOTES
Dermatology New Patient Visit    Chief Complaint   Patient presents with   • Establish Care       Subjective:     HPI:   Alis Gonzalez is a 47 y.o. female presenting for    HPI: Rash over the whole body  Onset: 2 months ago  Seemed to start on the abdomen, spread to the arms, face > legs  Symptoms: red, inflamed skin, very itchy  Aggravating factors: heat  Alleviating factors: none  New creams/topicals: no  New medications (up to last 6 months): no  New travel: no  Other exposures: no  Treatments: topical mometasone-not helping, was treated with PO prednisone (up to 60mg), states this cleared up the rash signficantly, but only 70-80 percent, then flared back up; atarax helps somewhat to sleep, but alter's patient's mood  Was additionally given ABX for presumed infection - patient states she has had a low-grade fever to 99    HPI/location: spot on the left cheek  Time present: months  Painful lesion: No  Itching lesion: No  Enlarging lesion: No  Anything make it better or worse? n/a    History of skin cancer: No  History of precancers/actinic keratoses: No  History of biopsies:Yes, Details: scalp for Alopecia  History of blistering/severe sunburns:No  Family history of skin cancer:Yes, Details: Father on cheek unknown type.   Family history of atypical moles:No              Past Medical History:   Diagnosis Date   • Alopecia 2015   • Asthma    • Thyroid disease        Current Outpatient Prescriptions on File Prior to Visit   Medication Sig Dispense Refill   • montelukast (SINGULAIR) 10 MG Tab Take 10 mg by mouth every day.     • levothyroxine (SYNTHROID) 75 MCG Tab Take 1 Tab by mouth Every morning on an empty stomach. 90 Tab 3   • vitamin D (CHOLECALCIFEROL) 1000 UNIT Tab Take 4 Tabs by mouth every day. 60 Tab    • Misc. Devices Misc One blood pressure monitor and cuff 1 Units 0   • VENTOLIN  (90 Base) MCG/ACT Aero Soln inhalation aerosol Inhale 1-2 Inhalation by mouth every four hours as needed.  2   •  "mometasone (ELOCON) 0.1 % Ointment Apply 1 Application to affected area(s) as needed.  1   • hydrOXYzine HCl (ATARAX) 25 MG Tab Take 1 Tab by mouth at bedtime as needed (sleep). 90 Tab 3   • NON SPECIFIED Take 5,000 Int'l Units by mouth every day.       No current facility-administered medications on file prior to visit.        Allergies   Allergen Reactions   • Azithromycin Rash     All over body   • Ciprofloxacin Rash     All over body   • Penicillins Rash     Rash         Family History   Problem Relation Age of Onset   • Thyroid Mother      cancer-40's   • Cancer Father      skin-melanoma   • Hyperlipidemia Father    • Cancer Maternal Aunt      breast to brain   • Breast Cancer Maternal Aunt        Social History     Social History   • Marital status:      Spouse name: N/A   • Number of children: N/A   • Years of education: N/A     Occupational History   • Not on file.     Social History Main Topics   • Smoking status: Never Smoker   • Smokeless tobacco: Never Used   • Alcohol use Yes      Comment: rarely   • Drug use: No   • Sexual activity: Yes     Partners: Male     Other Topics Concern   • Not on file     Social History Narrative   • No narrative on file       Review of Systems   Constitutional: Positive for chills and fever.   Skin: Positive for itching and rash.   All other systems reviewed and are negative.       Objective:     A focused cutaneous exam was completed including: scalp, hair, ears, face, eyelids, conjunctiva, lips, neck, chest, abdomen, back, left and right upper extremities (including hands/digits and fingernails), left and right lower extremities with the following pertinent findings listed below. Remaining above-listed examined areas within normal limits / negative for rashes or lesions.    Temperature 37.3 °C (99.2 °F), height 1.651 m (5' 5\"), weight 84.4 kg (186 lb).    Physical Exam   Constitutional: She is oriented to person, place, and time and well-developed, well-nourished, " and in no distress.   HENT:   Head: Normocephalic and atraumatic.       Eyes: Conjunctivae and lids are normal.   Neck: Normal range of motion.   Pulmonary/Chest: Effort normal.   Neurological: She is alert and oriented to person, place, and time.   Skin: Skin is warm and dry.        Psychiatric: Mood and affect normal.   Vitals reviewed.      DATA: Results for VOLODYMYR LIM (MRN [id number]) as of 6/13/2018 06:15   Ref. Range 1/19/2018 09:21 4/22/2018 14:25 5/25/2018 13:01   WBC Latest Ref Range: 4.8 - 10.8 K/uL   12.3 (H)   RBC Latest Ref Range: 4.20 - 5.40 M/uL   4.88   Hemoglobin Latest Ref Range: 12.0 - 16.0 g/dL   14.9   Hematocrit Latest Ref Range: 37.0 - 47.0 %   45.9   MCV Latest Ref Range: 81.4 - 97.8 fL   94.1   MCH Latest Ref Range: 27.0 - 33.0 pg   30.5   MCHC Latest Ref Range: 33.6 - 35.0 g/dL   32.5 (L)   RDW Latest Ref Range: 35.9 - 50.0 fL   45.2   Platelet Count Latest Ref Range: 164 - 446 K/uL   522 (H)   MPV Latest Ref Range: 9.0 - 12.9 fL   9.3   Sed Rate Westergren Latest Ref Range: 0 - 20 mm/hour   2   Sodium Latest Ref Range: 135 - 145 mmol/L 134 (L)     Potassium Latest Ref Range: 3.6 - 5.5 mmol/L 3.9     Chloride Latest Ref Range: 96 - 112 mmol/L 101     Co2 Latest Ref Range: 20 - 33 mmol/L 26     Anion Gap Latest Ref Range: 0.0 - 11.9  7.0     Glucose Latest Ref Range: 65 - 99 mg/dL 93     Bun Latest Ref Range: 8 - 22 mg/dL 11     Creatinine Latest Ref Range: 0.50 - 1.40 mg/dL 0.54     GFR If  Latest Ref Range: >60 mL/min/1.73 m 2 >60     GFR If Non  Latest Ref Range: >60 mL/min/1.73 m 2 >60     Calcium Latest Ref Range: 8.5 - 10.5 mg/dL 8.7     AST(SGOT) Latest Ref Range: 12 - 45 U/L 20     ALT(SGPT) Latest Ref Range: 2 - 50 U/L 22     Alkaline Phosphatase Latest Ref Range: 30 - 99 U/L 68     Total Bilirubin Latest Ref Range: 0.1 - 1.5 mg/dL 0.5     Albumin Latest Ref Range: 3.2 - 4.9 g/dL 4.6     Total Protein Latest Ref Range: 6.0 - 8.2 g/dL 7.5     Globulin  Latest Ref Range: 1.9 - 3.5 g/dL 2.9     A-G Ratio Latest Units: g/dL 1.6     C Reactive Protein High Sensitive Latest Ref Range: 0.0 - 7.5 mg/L   4.0   Iron Latest Ref Range: 40 - 170 ug/dL   86   Total Iron Binding Latest Ref Range: 250 - 450 ug/dL   342   % Saturation Latest Ref Range: 15 - 55 %   25   Cholesterol,Tot Latest Ref Range: 100 - 199 mg/dL 166     Triglycerides Latest Ref Range: 0 - 149 mg/dL 88     HDL Latest Ref Range: >=40 mg/dL 40     LDL Latest Ref Range: <100 mg/dL 108 (H)     25-Hydroxy   Vitamin D 25 Latest Ref Range: 30 - 100 ng/mL 39     TSH Latest Ref Range: 0.380 - 5.330 uIU/mL 1.550     Antinuclear Antibody Latest Ref Range: None Detected    None Detected   CT-ABDOMEN-PELVIS WITH Unknown  Rpt        Assessment and Plan:     1. Disease of skin and subcutaneous tissue  Comment: ddx severe eczema flare vs r/o pityriasis rubra pilaris vs unlikely CTCL or fungal  - will bx, PATHOLOGY SPECIMEN; Future  - instructed to start clobetasol 0.05% ointment to affected area of the body twice daily until improved. Patient instructed to avoid face, axilla, and groin area. Side effects discussed, including skin thinning, appearance of stretch marks (striae), easy bruising, telangiectasias, and possible increased hair growth   - hydroxyzine 25-50mg qhs prn. Side effects, drowsiness, discussed. Instructed to not drive/operate heavy machinery when taking this medication  - pending results of bx, will discuss options of systemic therapy    Procedure Note   Procedure: Biopsy by punch technique  Location: right forearm  Preoperative diagnosis:above  Risks, benefits and alternatives of procedure discussed and written informed consent obtained. Time out completed. Area of biopsy prepped with alcohol. Anesthesia with 1% lidocaine with epinephrine administered with a 30 gauge needle. 4  mm punch biopsy of site performed. Hemostasis achieved with pressure and 4.0 prolene sutures. Vaseline applied to wound with  bandage. Patient tolerated procedure well, and there were no complications.  The pathology specimen was sent to the lab via the staff.  Wound care was discussed with the patient.     Followup: Return for 7-10 days WITH MD. Akilah Thomason M.D.

## 2018-06-13 ASSESSMENT — ENCOUNTER SYMPTOMS
FEVER: 1
CHILLS: 1

## 2018-06-21 ENCOUNTER — OFFICE VISIT (OUTPATIENT)
Dept: DERMATOLOGY | Facility: IMAGING CENTER | Age: 48
End: 2018-06-21
Payer: COMMERCIAL

## 2018-06-21 DIAGNOSIS — L44.0 PITYRIASIS RUBRA: ICD-10-CM

## 2018-06-21 RX ORDER — TAZAROTENE 1 MG/G
CREAM TOPICAL
Qty: 30 G | Refills: 3 | Status: SHIPPED | OUTPATIENT
Start: 2018-06-21 | End: 2020-04-13

## 2018-07-19 ENCOUNTER — HOSPITAL ENCOUNTER (OUTPATIENT)
Dept: RADIOLOGY | Facility: MEDICAL CENTER | Age: 48
End: 2018-07-19
Attending: NURSE PRACTITIONER
Payer: COMMERCIAL

## 2018-07-19 DIAGNOSIS — R93.89 ABNORMAL CT SCAN: ICD-10-CM

## 2018-07-19 PROCEDURE — 76830 TRANSVAGINAL US NON-OB: CPT

## 2018-07-20 DIAGNOSIS — R93.5 ABNORMAL ULTRASOUND OF UTERUS: ICD-10-CM

## 2018-07-25 DIAGNOSIS — R21 RASH: ICD-10-CM

## 2018-07-25 DIAGNOSIS — L65.9 ALOPECIA: Chronic | ICD-10-CM

## 2018-07-25 DIAGNOSIS — L23.9 ALLERGIC ECZEMA: Chronic | ICD-10-CM

## 2018-07-26 RX ORDER — PREDNISONE 10 MG/1
TABLET ORAL
Qty: 30 TAB | Refills: 1 | Status: SHIPPED | OUTPATIENT
Start: 2018-07-26 | End: 2018-08-21

## 2018-08-21 ENCOUNTER — OFFICE VISIT (OUTPATIENT)
Dept: MEDICAL GROUP | Facility: MEDICAL CENTER | Age: 48
End: 2018-08-21
Payer: COMMERCIAL

## 2018-08-21 VITALS
WEIGHT: 187 LBS | RESPIRATION RATE: 14 BRPM | HEART RATE: 81 BPM | BODY MASS INDEX: 31.16 KG/M2 | SYSTOLIC BLOOD PRESSURE: 138 MMHG | TEMPERATURE: 97.9 F | DIASTOLIC BLOOD PRESSURE: 84 MMHG | HEIGHT: 65 IN | OXYGEN SATURATION: 97 %

## 2018-08-21 DIAGNOSIS — E66.9 OBESITY (BMI 30-39.9): ICD-10-CM

## 2018-08-21 DIAGNOSIS — R39.11 URINARY HESITANCY: ICD-10-CM

## 2018-08-21 DIAGNOSIS — R39.198 SUBJECTIVE CHANGE IN URINATION: ICD-10-CM

## 2018-08-21 DIAGNOSIS — R10.13 EPIGASTRIC PAIN: ICD-10-CM

## 2018-08-21 LAB
APPEARANCE UR: NORMAL
BILIRUB UR STRIP-MCNC: NEGATIVE MG/DL
COLOR UR AUTO: YELLOW
GLUCOSE UR STRIP.AUTO-MCNC: NEGATIVE MG/DL
KETONES UR STRIP.AUTO-MCNC: NEGATIVE MG/DL
LEUKOCYTE ESTERASE UR QL STRIP.AUTO: NEGATIVE
NITRITE UR QL STRIP.AUTO: NEGATIVE
PH UR STRIP.AUTO: 7.5 [PH] (ref 5–8)
PROT UR QL STRIP: NORMAL MG/DL
RBC UR QL AUTO: NEGATIVE
SP GR UR STRIP.AUTO: 1.02
UROBILINOGEN UR STRIP-MCNC: 0.2 MG/DL

## 2018-08-21 PROCEDURE — 81002 URINALYSIS NONAUTO W/O SCOPE: CPT | Performed by: NURSE PRACTITIONER

## 2018-08-21 PROCEDURE — 99214 OFFICE O/P EST MOD 30 MIN: CPT | Performed by: NURSE PRACTITIONER

## 2018-08-21 RX ORDER — OMEPRAZOLE 20 MG/1
20 CAPSULE, DELAYED RELEASE ORAL
Qty: 30 CAP | Refills: 1 | Status: SHIPPED | OUTPATIENT
Start: 2018-08-21 | End: 2020-04-13

## 2018-08-21 NOTE — PROGRESS NOTES
"CC: Difficulty Urinating (x 4 days; no other symptoms)        HPI:     Alis is a [facility] patient, all problems are new to me todaypresents today for the followin. Subjective change in urination/ Urinary hesitancy  Here today stating for the last 4 days she has had difficulty urinating.  She does not specifically have any urgency or pain however she states that when she does urinate only a small amount comes out \"like a tablespoon at most\".  She does try go every hour.  She states she is drinking lots of fluids.  She does not have any associated pelvic pain.  No specific history of urinary tract infections or bladder issues.  No kidney issues in her history.  Had a CT scan done approximately 4 months ago which did not show any abnormality other than a small simple kidney cyst    3. Epigastric pain  Has an associated symptom with it she says that she has been noticing some discomfort and what she feels like a \"vein\" in the epigastric area.  States is uncomfortable when wearing bra so she no longer is using an underwire bra.  Uncomfortable and driving likely related to position.  This is been going on for the last couple weeks.  Concerned because she states she lost 20 pounds however this appears to be between April and .  She has some associated bloating      4. Obesity (BMI 30-39.9)  Patient did voice concern regards to her unexplained weight loss between April and .  Lost about 15 pounds.  Weight appears to be stable over the last 2-3 months.    Current Outpatient Prescriptions   Medication Sig Dispense Refill   • predniSONE (DELTASONE) 10 MG Tab TAKE 1 TABLET BY MOUTH EVERY DAY 30 Tab 1   • tazorotene (TAZORAC) 0.1 % cream To areas on the body daily 30 g 3   • betamethasone dipropionate (DIPROLENE) 0.05 % Ointment Apply 1 Application to affected area(s) 2 Times a Day. PATIENT HAS VERY LARGE SURFACE KENNA, PLEASE GIVE 2 TUBES if POSSIBLE 90 g 4   • vitamin D (CHOLECALCIFEROL) 1000 UNIT Tab Take 4 Tabs " "by mouth every day. 60 Tab    • Misc. Devices Misc One blood pressure monitor and cuff 1 Units 0   • montelukast (SINGULAIR) 10 MG Tab Take 10 mg by mouth every day.     • VENTOLIN  (90 Base) MCG/ACT Aero Soln inhalation aerosol Inhale 1-2 Inhalation by mouth every four hours as needed.  2   • mometasone (ELOCON) 0.1 % Ointment Apply 1 Application to affected area(s) as needed.  1   • hydrOXYzine HCl (ATARAX) 25 MG Tab Take 1 Tab by mouth at bedtime as needed (sleep). 90 Tab 3   • levothyroxine (SYNTHROID) 75 MCG Tab Take 1 Tab by mouth Every morning on an empty stomach. 90 Tab 3   • NON SPECIFIED Take 5,000 Int'l Units by mouth every day.       No current facility-administered medications for this visit.      Social History   Substance Use Topics   • Smoking status: Never Smoker   • Smokeless tobacco: Never Used   • Alcohol use Yes      Comment: rarely     I reviewed patients allergies, problem list and medications today in [facility].    ROS: Any/all pertinent positives listed in the HPI, otherwise all others reviewed are negative today.      /84   Pulse 81   Temp 36.6 °C (97.9 °F)   Resp 14   Ht 1.651 m (5' 5\")   Wt 84.8 kg (187 lb)   SpO2 97%   BMI 31.12 kg/m²     Exam  Gen: Alert and oriented, No apparent distress. WDWN  Psych: A+Ox3, normal affect and mood  Skin: Warm, dry and intact. Good turgor   Chronic rash visible areas.  Eye: Conjunctiva clear, lids normal  ENMT: Lips without lesions, good dentition  Lungs: Clear to auscultation bilaterally, no rales or rhonchi   Unlabored respiratory effort.   CV: Regular rate and rhythm, S1, S2. No murmurs.   No Edema  Abd: Soft non tender, non distended. Normal active bowel sounds.    No Hepatosplenomegaly, No pulsatile masses.   No CVA tenderness no suprapubic tenderness  Point-of-care urinalysis negative for blood leuks nitrates.  No glucose.      Assessment and Plan.   47 y.o. female with the following issues.    1. Subjective change in " urination/urinary hesitancy  clinically stable.  Renal ultrasound ordered to also check for urinary retention.  We will try and have this scheduled for her today.  Point-of-care urinalysis is negative for infection.  We discussed ER precautions repeatedly in the office if she is having significant pelvic pain and has any worsening in terms of her inability to urinate as much as she feels she should be.    - POCT Urinalysis  - US-RENAL; Future    3. Epigastric pain  Clinically stable. current unknown etiology for this however I did order an ultrasound given that she will be getting an ultrasound done today for her  Bladder/kidneys.  Discussed with patient this thickening that I am able to feel is pretty mild and does appear to be superficial.  I did send low-dose omeprazole to try every morning to see if this helps with any of the bloating and epigastric discomfort.  She has an appointment with her PCP next week to follow-up on this  - US-ABDOMEN LIMITED; Future    4. Obesity (BMI 30-39.9)  Weight is currently stable and we will continue to monitor

## 2018-08-22 ENCOUNTER — HOSPITAL ENCOUNTER (OUTPATIENT)
Dept: RADIOLOGY | Facility: MEDICAL CENTER | Age: 48
End: 2018-08-22
Attending: NURSE PRACTITIONER
Payer: COMMERCIAL

## 2018-08-22 DIAGNOSIS — R39.11 URINARY HESITANCY: ICD-10-CM

## 2018-08-22 DIAGNOSIS — R10.13 EPIGASTRIC PAIN: ICD-10-CM

## 2018-08-22 DIAGNOSIS — R39.198 SUBJECTIVE CHANGE IN URINATION: ICD-10-CM

## 2018-08-22 PROCEDURE — 76775 US EXAM ABDO BACK WALL LIM: CPT

## 2018-08-22 PROCEDURE — 76705 ECHO EXAM OF ABDOMEN: CPT

## 2018-09-16 DIAGNOSIS — E03.8 OTHER SPECIFIED HYPOTHYROIDISM: ICD-10-CM

## 2018-09-17 RX ORDER — LEVOTHYROXINE SODIUM 75 MCG
75 TABLET ORAL
Qty: 90 TAB | Refills: 1 | Status: SHIPPED | OUTPATIENT
Start: 2018-09-17 | End: 2019-04-12 | Stop reason: SDUPTHER

## 2018-09-24 DIAGNOSIS — R21 RASH: ICD-10-CM

## 2018-09-24 DIAGNOSIS — L65.9 ALOPECIA: Chronic | ICD-10-CM

## 2018-09-24 DIAGNOSIS — L23.9 ALLERGIC ECZEMA: Chronic | ICD-10-CM

## 2018-09-25 RX ORDER — PREDNISONE 10 MG/1
10 TABLET ORAL DAILY
Qty: 30 TAB | Refills: 1 | Status: SHIPPED | OUTPATIENT
Start: 2018-09-25 | End: 2018-12-19 | Stop reason: SDUPTHER

## 2018-11-16 RX ORDER — ALBUTEROL SULFATE 90 UG/1
1-2 AEROSOL, METERED RESPIRATORY (INHALATION) EVERY 4 HOURS PRN
Qty: 8.5 G | Refills: 6 | Status: SHIPPED | OUTPATIENT
Start: 2018-11-16 | End: 2019-11-15 | Stop reason: SDUPTHER

## 2018-12-18 ENCOUNTER — TELEPHONE (OUTPATIENT)
Dept: MEDICAL GROUP | Facility: PHYSICIAN GROUP | Age: 48
End: 2018-12-18

## 2018-12-18 NOTE — TELEPHONE ENCOUNTER
Future Appointments       Provider Department Center    12/19/2018 3:15 PM Nataly Brown M.D. [facility]        ESTABLISHED PATIENT PRE-VISIT PLANNING     Patient was NOT contacted to complete PVP.     Note: Patient will not be contacted if there is no indication to call.     1.  Reviewed notes from the last few office visits within the medical group: Yes    2.  If any orders were placed at last visit or intended to be done for this visit (i.e. 6 mos follow-up), do we have Results/Consult Notes?        •  Labs - Labs were not ordered at last office visit.       •  Imaging - Imaging ordered, completed and results are in chart.       •  Referrals - Referral ordered, patient has NOT been seen.    3. Is this appointment scheduled as a Hospital Follow-Up? No    4.  Immunizations were updated in [facility] using WebIZ?: Yes       •  Web Iz Recommendations: FLU, MMR , TD and SHINGRIX (Shingles)    5.  Patient is due for the following Health Maintenance Topics:   Health Maintenance Due   Topic Date Due   • PAP SMEAR  10/19/1989   • IMM INFLUENZA (1) 09/01/2018   • COLONOSCOPY  10/19/2018   • IMM ZOSTER VACCINES (1 of 2) 10/19/2018       6.  MDX printed for Provider? NO    7.  Patient was informed to arrive 15 min prior to their scheduled appointment and bring in their medication bottles.

## 2018-12-19 ENCOUNTER — OFFICE VISIT (OUTPATIENT)
Dept: MEDICAL GROUP | Facility: MEDICAL CENTER | Age: 48
End: 2018-12-19
Payer: COMMERCIAL

## 2018-12-19 VITALS
DIASTOLIC BLOOD PRESSURE: 90 MMHG | HEART RATE: 88 BPM | TEMPERATURE: 97.9 F | RESPIRATION RATE: 16 BRPM | HEIGHT: 65 IN | BODY MASS INDEX: 31.65 KG/M2 | WEIGHT: 190 LBS | SYSTOLIC BLOOD PRESSURE: 138 MMHG | OXYGEN SATURATION: 93 %

## 2018-12-19 DIAGNOSIS — L23.9 ALLERGIC ECZEMA: Chronic | ICD-10-CM

## 2018-12-19 DIAGNOSIS — J45.20 MILD INTERMITTENT ASTHMA, UNSPECIFIED WHETHER COMPLICATED: ICD-10-CM

## 2018-12-19 DIAGNOSIS — E78.5 DYSLIPIDEMIA: ICD-10-CM

## 2018-12-19 DIAGNOSIS — E03.8 OTHER SPECIFIED HYPOTHYROIDISM: Chronic | ICD-10-CM

## 2018-12-19 DIAGNOSIS — E55.9 VITAMIN D INSUFFICIENCY: Chronic | ICD-10-CM

## 2018-12-19 PROCEDURE — 99214 OFFICE O/P EST MOD 30 MIN: CPT | Performed by: NURSE PRACTITIONER

## 2018-12-19 RX ORDER — PREDNISONE 10 MG/1
10 TABLET ORAL DAILY
Qty: 30 TAB | Refills: 6 | Status: SHIPPED | OUTPATIENT
Start: 2018-12-19 | End: 2019-01-02 | Stop reason: SDUPTHER

## 2018-12-19 RX ORDER — IPRATROPIUM BROMIDE AND ALBUTEROL SULFATE 2.5; .5 MG/3ML; MG/3ML
3 SOLUTION RESPIRATORY (INHALATION) 4 TIMES DAILY
Qty: 30 BULLET | Refills: 11 | Status: SHIPPED | OUTPATIENT
Start: 2018-12-19 | End: 2018-12-26 | Stop reason: SDUPTHER

## 2018-12-19 NOTE — PROGRESS NOTES
cc:  asthma      Subjective:     HPI:     Alis Gonzalez is a 48 y.o. female here to discuss the evaluation and management of:    Asthma exacerbation  Patient states that for the last week she has been having an exacerbation of her asthma.  She is been wheezing more and having shortness of breath, coughing with phlegm.  She states is been having to use her rescue inhaler more.  She does not have a nebulizer at home.  She denies any runny nose, nasal congestion, ear pain, fever, chills, nausea or vomiting.  She feels like her rescue inhaler is not as effective.  Patient states that she was giving a Breo inhaler from her allergist however feels like is not effective.    Vitamin D deficiency  Has not been taking any vitamin D since the previous high-dose order.    Cholesterol.  Not take any medication for this.    Alopecia/eczema  Has been following up with dermatology and her allergist.  Still has a rash, itchy, dry, flaky skin    Thyroid  Patient states that she has been taking her thyroid medications daily.    ROS:  Denies any Headache, Blurred Vision, Confusion, Chest pain,  Shortness of breath,  Abdominal pain, Changes of bowel or bladder, Lower ext edema, Fevers, Nights sweats, Weight Changes, Focal weakness or numbness.  And all other systems are negative.        Current Outpatient Prescriptions:   •  Misc. Devices Misc, One Nebulizer unit. Dx: code J45.20, Disp: 1 Units, Rfl: 0  •  ipratropium-albuterol (DUONEB) 0.5-2.5 (3) MG/3ML nebulizer solution, 3 mL by Nebulization route 4 times a day., Disp: 30 Bullet, Rfl: 11  •  predniSONE (DELTASONE) 10 MG Tab, Take 1 Tab by mouth every day., Disp: 30 Tab, Rfl: 6  •  beclomethasone (QVAR) 80 MCG/ACT inhaler, Inhale 1 Puff by mouth 2 times a day., Disp: 7.3 g, Rfl: 3  •  VENTOLIN  (90 Base) MCG/ACT Aero Soln inhalation aerosol, Inhale 1-2 Puffs by mouth every four hours as needed., Disp: 8.5 g, Rfl: 6  •  SYNTHROID 75 MCG Tab, TAKE 1 TAB BY MOUTH EVERY MORNING  ON AN EMPTY STOMACH., Disp: 90 Tab, Rfl: 1  •  omeprazole (PRILOSEC) 20 MG delayed-release capsule, Take 1 Cap by mouth every morning before breakfast., Disp: 30 Cap, Rfl: 1  •  betamethasone dipropionate (DIPROLENE) 0.05 % Ointment, Apply 1 Application to affected area(s) 2 Times a Day. PATIENT HAS VERY LARGE SURFACE KENNA, PLEASE GIVE 2 TUBES if POSSIBLE, Disp: 90 g, Rfl: 4  •  vitamin D (CHOLECALCIFEROL) 1000 UNIT Tab, Take 4 Tabs by mouth every day., Disp: 60 Tab, Rfl:   •  tazorotene (TAZORAC) 0.1 % cream, To areas on the body daily, Disp: 30 g, Rfl: 3  •  Misc. Devices Misc, One blood pressure monitor and cuff, Disp: 1 Units, Rfl: 0  •  montelukast (SINGULAIR) 10 MG Tab, Take 10 mg by mouth every day., Disp: , Rfl:   •  mometasone (ELOCON) 0.1 % Ointment, Apply 1 Application to affected area(s) as needed., Disp: , Rfl: 1  •  NON SPECIFIED, Take 5,000 Int'l Units by mouth every day., Disp: , Rfl:     Allergies   Allergen Reactions   • Azithromycin Rash     All over body   • Ciprofloxacin Rash     All over body   • Penicillins Rash     Rash         Past Medical History:   Diagnosis Date   • Alopecia 2015   • Asthma    • Thyroid disease      No past surgical history on file.  Family History   Problem Relation Age of Onset   • Thyroid Mother         cancer-40's   • Cancer Father         skin-melanoma   • Hyperlipidemia Father    • Cancer Maternal Aunt         breast to brain   • Breast Cancer Maternal Aunt      Social History     Social History   • Marital status:      Spouse name: N/A   • Number of children: N/A   • Years of education: N/A     Occupational History   • Not on file.     Social History Main Topics   • Smoking status: Never Smoker   • Smokeless tobacco: Never Used   • Alcohol use Yes      Comment: rarely   • Drug use: No   • Sexual activity: Yes     Partners: Male     Other Topics Concern   • Not on file     Social History Narrative   • No narrative on file       Objective:     Vitals: /90  "  Pulse 88   Temp 36.6 °C (97.9 °F)   Resp 16   Ht 1.651 m (5' 5\")   Wt 86.2 kg (190 lb)   SpO2 93%   BMI 31.62 kg/m²    General: Alert, pleasant, NAD  HEENT: Normocephalic.    Heart: Regular rate and rhythm.  S1 and S2 normal.  No murmurs appreciated.  Respiratory: Shallow, few rhonchi with expiratory wheezes  Skin: Warm, dry, no rashes.  Musculoskeletal: Gait is normal.  Moves all extremities well.  Extremities: No leg edema. No discoloration  Neurological: No tremors, sensation grossly intact, gait is normal,   Psych:  Affect/mood is normal, judgement is good, memory is intact, grooming is appropriate.    Assessment/Plan:     Alis was seen today for cough.    Diagnoses and all orders for this visit:    Mild intermittent asthma, unspecified whether complicated  Not well controlled at this time.  Stable.  No acute distress.  Patient does have some rhonchi and some wheezing.  Satting 92% on room air.  No fevers.  She was taking Breo from her allergist however felt like it was not effective.  We will change her to the Qvar and have ordered her nebulizer for home.  Will trial this for 1 month.  -     Misc. Devices Misc; One Nebulizer unit. Dx: code J45.20  -     ipratropium-albuterol (DUONEB) 0.5-2.5 (3) MG/3ML nebulizer solution; 3 mL by Nebulization route 4 times a day.  -     predniSONE (DELTASONE) 10 MG Tab; Take 1 Tab by mouth every day.  -     beclomethasone (QVAR) 80 MCG/ACT inhaler; Inhale 1 Puff by mouth 2 times a day.    Allergic eczema  Continue to follow-up with her dermatologist and allergist.  Uses prednisone every now and then for severe flare.  -     predniSONE (DELTASONE) 10 MG Tab; Take 1 Tab by mouth every day.    Vitamin D insufficiency  Repeat labs.  -     VITAMIN D,25 HYDROXY; Future    Dyslipidemia  Repeat labs.  -     Lipid Profile; Future    Other specified hypothyroidism  Patient states she feels stable on her current thyroid medication.  Denies any heart palpitations, constipation.  " Repeat labs.  -     COMP METABOLIC PANEL; Future  -     TSH WITH REFLEX TO FT4; Future  -     CBC WITHOUT DIFFERENTIAL; Future              No Follow-up on file.          Comfort GILLIS.

## 2018-12-26 DIAGNOSIS — J45.20 MILD INTERMITTENT ASTHMA, UNSPECIFIED WHETHER COMPLICATED: ICD-10-CM

## 2018-12-26 RX ORDER — IPRATROPIUM BROMIDE AND ALBUTEROL SULFATE 2.5; .5 MG/3ML; MG/3ML
3 SOLUTION RESPIRATORY (INHALATION) 4 TIMES DAILY
Qty: 120 BULLET | Refills: 11 | Status: SHIPPED | OUTPATIENT
Start: 2018-12-26 | End: 2020-04-13

## 2019-01-02 DIAGNOSIS — L23.9 ALLERGIC ECZEMA: Chronic | ICD-10-CM

## 2019-01-02 DIAGNOSIS — J45.20 MILD INTERMITTENT ASTHMA, UNSPECIFIED WHETHER COMPLICATED: ICD-10-CM

## 2019-01-02 DIAGNOSIS — L65.9 ALOPECIA: Chronic | ICD-10-CM

## 2019-01-02 RX ORDER — PREDNISONE 10 MG/1
10 TABLET ORAL 2 TIMES DAILY
Qty: 60 TAB | Refills: 1 | Status: SHIPPED | OUTPATIENT
Start: 2019-01-02 | End: 2019-02-27 | Stop reason: SDUPTHER

## 2019-01-03 ENCOUNTER — TELEPHONE (OUTPATIENT)
Dept: MEDICAL GROUP | Facility: MEDICAL CENTER | Age: 49
End: 2019-01-03

## 2019-01-03 DIAGNOSIS — Z12.11 SCREENING FOR COLORECTAL CANCER: Primary | ICD-10-CM

## 2019-01-03 DIAGNOSIS — Z12.12 SCREENING FOR COLORECTAL CANCER: Primary | ICD-10-CM

## 2019-01-03 NOTE — TELEPHONE ENCOUNTER
Good morning,    This patient is overdue for health maintenance topics that need orders so she can complete them.     Please review the pended orders in  to sign or make adjustments as appropriate.    Close the encounter when complete.  If declining these orders, please document a reason and route to the Outreach MA pool (p [facility]).  I will call the patient to cancel the appointment.      Thank you,    Rosalind Villanueva MA

## 2019-01-23 ENCOUNTER — HOSPITAL ENCOUNTER (OUTPATIENT)
Dept: RADIOLOGY | Facility: MEDICAL CENTER | Age: 49
End: 2019-01-23
Attending: NURSE PRACTITIONER
Payer: COMMERCIAL

## 2019-01-23 DIAGNOSIS — Z12.39 SCREENING FOR BREAST CANCER: ICD-10-CM

## 2019-01-23 PROCEDURE — 77063 BREAST TOMOSYNTHESIS BI: CPT

## 2019-02-27 DIAGNOSIS — L23.9 ALLERGIC ECZEMA: Chronic | ICD-10-CM

## 2019-02-27 DIAGNOSIS — J45.20 MILD INTERMITTENT ASTHMA, UNSPECIFIED WHETHER COMPLICATED: ICD-10-CM

## 2019-02-27 RX ORDER — PREDNISONE 10 MG/1
10 TABLET ORAL 2 TIMES DAILY
Qty: 60 TAB | Refills: 1 | Status: SHIPPED | OUTPATIENT
Start: 2019-02-27 | End: 2019-05-04 | Stop reason: SDUPTHER

## 2019-04-12 DIAGNOSIS — E03.8 OTHER SPECIFIED HYPOTHYROIDISM: ICD-10-CM

## 2019-04-12 RX ORDER — LEVOTHYROXINE SODIUM 75 MCG
75 TABLET ORAL
Qty: 90 TAB | Refills: 1 | Status: SHIPPED | OUTPATIENT
Start: 2019-04-12 | End: 2019-10-04 | Stop reason: SDUPTHER

## 2019-05-04 DIAGNOSIS — L23.9 ALLERGIC ECZEMA: Chronic | ICD-10-CM

## 2019-05-04 DIAGNOSIS — J45.20 MILD INTERMITTENT ASTHMA, UNSPECIFIED WHETHER COMPLICATED: ICD-10-CM

## 2019-05-06 RX ORDER — PREDNISONE 10 MG/1
10 TABLET ORAL 2 TIMES DAILY
Qty: 60 TAB | Refills: 1 | Status: SHIPPED | OUTPATIENT
Start: 2019-05-06 | End: 2019-06-27 | Stop reason: SDUPTHER

## 2019-06-25 ENCOUNTER — HOSPITAL ENCOUNTER (OUTPATIENT)
Dept: LAB | Facility: MEDICAL CENTER | Age: 49
End: 2019-06-25
Attending: NURSE PRACTITIONER
Payer: COMMERCIAL

## 2019-06-25 DIAGNOSIS — E03.8 OTHER SPECIFIED HYPOTHYROIDISM: Chronic | ICD-10-CM

## 2019-06-25 DIAGNOSIS — E78.5 DYSLIPIDEMIA: ICD-10-CM

## 2019-06-25 DIAGNOSIS — E55.9 VITAMIN D INSUFFICIENCY: Chronic | ICD-10-CM

## 2019-06-25 LAB
25(OH)D3 SERPL-MCNC: 19 NG/ML (ref 30–100)
ALBUMIN SERPL BCP-MCNC: 4.1 G/DL (ref 3.2–4.9)
ALBUMIN/GLOB SERPL: 1.5 G/DL
ALP SERPL-CCNC: 73 U/L (ref 30–99)
ALT SERPL-CCNC: 49 U/L (ref 2–50)
ANION GAP SERPL CALC-SCNC: 11 MMOL/L (ref 0–11.9)
AST SERPL-CCNC: 39 U/L (ref 12–45)
BASOPHILS # BLD AUTO: 0.5 % (ref 0–1.8)
BASOPHILS # BLD: 0.05 K/UL (ref 0–0.12)
BILIRUB SERPL-MCNC: 0.8 MG/DL (ref 0.1–1.5)
BUN SERPL-MCNC: 15 MG/DL (ref 8–22)
CALCIUM SERPL-MCNC: 9.2 MG/DL (ref 8.5–10.5)
CHLORIDE SERPL-SCNC: 108 MMOL/L (ref 96–112)
CHOLEST SERPL-MCNC: 158 MG/DL (ref 100–199)
CO2 SERPL-SCNC: 24 MMOL/L (ref 20–33)
CREAT SERPL-MCNC: 0.77 MG/DL (ref 0.5–1.4)
EOSINOPHIL # BLD AUTO: 0.92 K/UL (ref 0–0.51)
EOSINOPHIL NFR BLD: 10 % (ref 0–6.9)
ERYTHROCYTE [DISTWIDTH] IN BLOOD BY AUTOMATED COUNT: 45.6 FL (ref 35.9–50)
FASTING STATUS PATIENT QL REPORTED: NORMAL
GLOBULIN SER CALC-MCNC: 2.8 G/DL (ref 1.9–3.5)
GLUCOSE SERPL-MCNC: 131 MG/DL (ref 65–99)
HCT VFR BLD AUTO: 43.3 % (ref 37–47)
HDLC SERPL-MCNC: 29 MG/DL
HGB BLD-MCNC: 13.8 G/DL (ref 12–16)
IMM GRANULOCYTES # BLD AUTO: 0.03 K/UL (ref 0–0.11)
IMM GRANULOCYTES NFR BLD AUTO: 0.3 % (ref 0–0.9)
LDLC SERPL CALC-MCNC: 109 MG/DL
LYMPHOCYTES # BLD AUTO: 1.73 K/UL (ref 1–4.8)
LYMPHOCYTES NFR BLD: 18.8 % (ref 22–41)
MCH RBC QN AUTO: 29.9 PG (ref 27–33)
MCHC RBC AUTO-ENTMCNC: 31.9 G/DL (ref 33.6–35)
MCV RBC AUTO: 93.9 FL (ref 81.4–97.8)
MONOCYTES # BLD AUTO: 0.83 K/UL (ref 0–0.85)
MONOCYTES NFR BLD AUTO: 9 % (ref 0–13.4)
NEUTROPHILS # BLD AUTO: 5.62 K/UL (ref 2–7.15)
NEUTROPHILS NFR BLD: 61.4 % (ref 44–72)
NRBC # BLD AUTO: 0 K/UL
NRBC BLD-RTO: 0 /100 WBC
PLATELET # BLD AUTO: 483 K/UL (ref 164–446)
PMV BLD AUTO: 9.2 FL (ref 9–12.9)
POTASSIUM SERPL-SCNC: 3.7 MMOL/L (ref 3.6–5.5)
PROT SERPL-MCNC: 6.9 G/DL (ref 6–8.2)
RBC # BLD AUTO: 4.61 M/UL (ref 4.2–5.4)
SODIUM SERPL-SCNC: 143 MMOL/L (ref 135–145)
TRIGL SERPL-MCNC: 100 MG/DL (ref 0–149)
TSH SERPL DL<=0.005 MIU/L-ACNC: 0.52 UIU/ML (ref 0.38–5.33)
WBC # BLD AUTO: 9.2 K/UL (ref 4.8–10.8)

## 2019-06-25 PROCEDURE — 84443 ASSAY THYROID STIM HORMONE: CPT

## 2019-06-25 PROCEDURE — 36415 COLL VENOUS BLD VENIPUNCTURE: CPT

## 2019-06-25 PROCEDURE — 80053 COMPREHEN METABOLIC PANEL: CPT

## 2019-06-25 PROCEDURE — 82306 VITAMIN D 25 HYDROXY: CPT

## 2019-06-25 PROCEDURE — 80061 LIPID PANEL: CPT

## 2019-06-25 PROCEDURE — 85025 COMPLETE CBC W/AUTO DIFF WBC: CPT

## 2019-06-27 ENCOUNTER — OFFICE VISIT (OUTPATIENT)
Dept: MEDICAL GROUP | Facility: MEDICAL CENTER | Age: 49
End: 2019-06-27
Payer: COMMERCIAL

## 2019-06-27 VITALS
OXYGEN SATURATION: 96 % | SYSTOLIC BLOOD PRESSURE: 150 MMHG | TEMPERATURE: 99.3 F | HEIGHT: 65 IN | DIASTOLIC BLOOD PRESSURE: 90 MMHG | HEART RATE: 107 BPM | BODY MASS INDEX: 33.99 KG/M2 | RESPIRATION RATE: 16 BRPM | WEIGHT: 204 LBS

## 2019-06-27 DIAGNOSIS — D75.838 SECONDARY THROMBOCYTOSIS: ICD-10-CM

## 2019-06-27 DIAGNOSIS — R41.3 MEMORY CHANGES: ICD-10-CM

## 2019-06-27 DIAGNOSIS — E03.8 OTHER SPECIFIED HYPOTHYROIDISM: Chronic | ICD-10-CM

## 2019-06-27 DIAGNOSIS — E55.9 VITAMIN D INSUFFICIENCY: Chronic | ICD-10-CM

## 2019-06-27 DIAGNOSIS — D72.10 EOSINOPHILIA: ICD-10-CM

## 2019-06-27 DIAGNOSIS — L23.9 ALLERGIC ECZEMA: Chronic | ICD-10-CM

## 2019-06-27 DIAGNOSIS — K76.0 FATTY LIVER: ICD-10-CM

## 2019-06-27 DIAGNOSIS — R03.0 ELEVATED BLOOD PRESSURE READING: ICD-10-CM

## 2019-06-27 DIAGNOSIS — R73.01 IFG (IMPAIRED FASTING GLUCOSE): ICD-10-CM

## 2019-06-27 LAB
HBA1C MFR BLD: 6.6 % (ref 0–5.6)
INT CON NEG: ABNORMAL
INT CON POS: ABNORMAL

## 2019-06-27 PROCEDURE — 99214 OFFICE O/P EST MOD 30 MIN: CPT | Performed by: NURSE PRACTITIONER

## 2019-06-27 PROCEDURE — 83036 HEMOGLOBIN GLYCOSYLATED A1C: CPT | Performed by: NURSE PRACTITIONER

## 2019-06-27 RX ORDER — PREDNISONE 10 MG/1
10 TABLET ORAL 2 TIMES DAILY
Qty: 60 TAB | Refills: 1 | Status: SHIPPED | OUTPATIENT
Start: 2019-06-27 | End: 2019-09-25 | Stop reason: SDUPTHER

## 2019-06-27 ASSESSMENT — PATIENT HEALTH QUESTIONNAIRE - PHQ9: CLINICAL INTERPRETATION OF PHQ2 SCORE: 0

## 2019-06-27 NOTE — LETTER
June 27, 2019        Alis Concepcion Held  [address] [place] [address]        To Whom it May Concern:    Please excuse Alis Carlos from work from June 27th, 2019 through July 5th, 2019 as she is currently under my medical care and treatment.         Sincerely,        ELIS Burt.    Electronically Signed

## 2019-06-27 NOTE — PROGRESS NOTES
cc: Follow-up labs, impaired fasting glucose      Subjective:     HPI:     Alis Gonzalez is a 48 y.o. female here to discuss the evaluation and management of      1. IFG (impaired fasting glucose)  Patient did have a impaired fasting glucose.  Concern is she does take prednisone intermittently for her allergic eczema.  Her A1c in clinic today was 6.6%.  She has noticed a reduced sense to urinate over the last 2 months.  Denies any increased thirst or increase in urination.    2. Secondary thrombocytosis  This is been a chronic problem.  Likely secondary to inflammation from her chronic skin condition.    3. Other specified hypothyroidism  Patient states is been taking her Synthroid 75 mcg daily on an empty stomach.  Most recent TSH was 0.520.    4. Elevated blood pressure reading  Patient has been taking steroids intermittently for her flareups and her skin and eczema.  Steroids can likely cause the increase in her blood pressure.  Denies any chest pain, shortness breath or dizziness.    5. Eosinophilia  Likely secondary to the chronic skin inflammation and irritation she has.    6. Allergic eczema  Patient did stop the steroid a week and a half ago.  States now she is having redness and her face that started to feel a few days ago.  She is feeling warm on her neck and face.  States that this will last a day or 2 and then go away.  She states that this appears to come and go without any specific pattern.  She has not followed up with dermatology as of yet.    7. Vitamin D insufficiency  Most recent vitamin D was 19 on her labs.  Encouraged her to take her over-the-counter vitamin D [place].    8. Memory changes  Patient states that she has been having some memory changes lately.  States she is been more forgetful.    9. Fatty liver  Most recent liver enzymes were within normal.      ROS:  Denies any Headache, Blurred Vision, Confusion, Chest pain,  Shortness of breath,  Abdominal pain, Changes of bowel or  bladder, Lower ext edema, Fevers, Nights sweats, Weight Changes, Focal weakness or numbness.  And all other systems are negative.  Decreased need to urinate, itchy, flaky skin, red, warm feeling and skin.      Current Outpatient Prescriptions:   •  predniSONE (DELTASONE) 10 MG Tab, Take 1 Tab by mouth 2 times a day. For break through allergic reactions, Disp: 60 Tab, Rfl: 1  •  SYNTHROID 75 MCG Tab, TAKE 1 TAB BY MOUTH EVERY MORNING ON AN EMPTY STOMACH., Disp: 90 Tab, Rfl: 1  •  vitamin D (CHOLECALCIFEROL) 1000 UNIT Tab, Take 4 Tabs by mouth every day., Disp: 60 Tab, Rfl:   •  ipratropium-albuterol (DUONEB) 0.5-2.5 (3) MG/3ML nebulizer solution, 3 mL by Nebulization route 4 times a day. (Patient not taking: Reported on 6/27/2019), Disp: 120 Bullet, Rfl: 11  •  Misc. Devices Misc, One Nebulizer unit. Dx: code J45.20 (Patient not taking: Reported on 6/27/2019), Disp: 1 Units, Rfl: 0  •  beclomethasone (QVAR) 80 MCG/ACT inhaler, Inhale 1 Puff by mouth 2 times a day. (Patient not taking: Reported on 6/27/2019), Disp: 7.3 g, Rfl: 3  •  VENTOLIN  (90 Base) MCG/ACT Aero Soln inhalation aerosol, Inhale 1-2 Puffs by mouth every four hours as needed. (Patient not taking: Reported on 6/27/2019), Disp: 8.5 g, Rfl: 6  •  omeprazole (PRILOSEC) 20 MG delayed-release capsule, Take 1 Cap by mouth every morning before breakfast. (Patient not taking: Reported on 6/27/2019), Disp: 30 Cap, Rfl: 1  •  tazorotene (TAZORAC) 0.1 % cream, To areas on the body daily (Patient not taking: Reported on 6/27/2019), Disp: 30 g, Rfl: 3  •  betamethasone dipropionate (DIPROLENE) 0.05 % Ointment, Apply 1 Application to affected area(s) 2 Times a Day. PATIENT HAS VERY LARGE SURFACE KENNA, PLEASE GIVE 2 TUBES if POSSIBLE (Patient not taking: Reported on 6/27/2019), Disp: 90 g, Rfl: 4  •  Misc. Devices Misc, One blood pressure monitor and cuff (Patient not taking: Reported on 6/27/2019), Disp: 1 Units, Rfl: 0  •  montelukast (SINGULAIR) 10 MG Tab,  "Take 10 mg by mouth every day., Disp: , Rfl:   •  mometasone (ELOCON) 0.1 % Ointment, Apply 1 Application to affected area(s) as needed., Disp: , Rfl: 1  •  NON SPECIFIED, Take 5,000 Int'l Units by mouth every day., Disp: , Rfl:     Allergies   Allergen Reactions   • Azithromycin Rash     All over body   • Ciprofloxacin Rash     All over body   • Penicillins Rash     Rash         Past Medical History:   Diagnosis Date   • Alopecia 2015   • Asthma    • Thyroid disease      No past surgical history on file.  Family History   Problem Relation Age of Onset   • Thyroid Mother         cancer-40's   • Cancer Father         skin-melanoma   • Hyperlipidemia Father    • Cancer Maternal Aunt         breast to brain   • Breast Cancer Maternal Aunt      Social History     Social History   • Marital status:      Spouse name: N/A   • Number of children: N/A   • Years of education: N/A     Occupational History   • Not on file.     Social History Main Topics   • Smoking status: Never Smoker   • Smokeless tobacco: Never Used   • Alcohol use Yes      Comment: rarely   • Drug use: No   • Sexual activity: Yes     Partners: Male     Other Topics Concern   • Not on file     Social History Narrative   • No narrative on file       Objective:     Vitals: /90   Pulse (!) 107   Temp 37.4 °C (99.3 °F)   Resp 16   Ht 1.651 m (5' 5\")   Wt 92.5 kg (204 lb)   SpO2 96%   BMI 33.95 kg/m²    General: Alert, pleasant, NAD  HEENT: Normocephalic.    Skin: erythematous, pruritic, flaky, dry  Extremities: No leg edema. No discoloration  Neurological: No tremors  Psych:  Affect/mood is normal, judgement is good, memory is intact, grooming is appropriate.    Assessment/Plan:     Diagnoses and all orders for this visit:    IFG (impaired fasting glucose)  Have discussed with patient that we can repeat this level.  She was on steroids as well which could contribute to the elevated sugars.  A1c in clinic today was 6.6%.  -     POCT  " A1C    Secondary thrombocytosis  This is chronically elevated.  Likely secondary to inflammation.    Other specified hypothyroidism  -     US-SOFT TISSUES OF HEAD - NECK; Future    Elevated blood pressure reading  Patient has elevated blood pressure 150/90.  Denies any chest pain, shortness of breath or dizziness.  She is uncomfortable at this time as she has a current flareup of her skin condition.  She was also on steroids which can contribute to the elevation of blood pressure as well.      Eosinophilia  -     CRP QUANTITIVE (NON-CARDIAC); Future  -     WESTERGREN SED RATE; Future  -     ESTEBAN REFLEXIVE PROFILE; Future  -     IGG SERUM QUANT; Future  -     C3+C4+COMPT    Allergic eczema  Have recommended patient to follow-up with the dermatologist.  -     predniSONE (DELTASONE) 10 MG Tab; Take 1 Tab by mouth 2 times a day. For break through allergic reactions  -     ESTEBAN REFLEXIVE PROFILE; Future  -     IGG SERUM QUANT; Future  -     C3+C4+COMPT    Vitamin D insufficiency   Not controlled.  Continue with over-the-counter vitamin D.    Memory changes  -     VITAMIN B12; Future  -     FOLATE; Future    Fatty liver  -     LIVER-KIDNEY MICROSOMAL AB; Future  -     HEREDITARY HEMOCHROMOTOSIS; Future  -     ANTI-SMOOTH MUSCLE ABS; Future      Return in about 2 weeks (around 7/11/2019).          Comfort GILLIS.

## 2019-07-03 PROBLEM — D72.10 EOSINOPHILIA: Status: ACTIVE | Noted: 2019-07-03

## 2019-07-03 PROBLEM — K76.0 FATTY LIVER: Status: ACTIVE | Noted: 2019-07-03

## 2019-07-03 PROBLEM — D75.838 SECONDARY THROMBOCYTOSIS: Status: ACTIVE | Noted: 2019-07-03

## 2019-07-08 RX ORDER — BETAMETHASONE DIPROPIONATE 0.05 %
OINTMENT (GRAM) TOPICAL
Qty: 15 G | Refills: 29 | OUTPATIENT
Start: 2019-07-08

## 2019-07-17 ENCOUNTER — HOSPITAL ENCOUNTER (OUTPATIENT)
Dept: RADIOLOGY | Facility: MEDICAL CENTER | Age: 49
End: 2019-07-17
Attending: NURSE PRACTITIONER
Payer: COMMERCIAL

## 2019-07-17 DIAGNOSIS — E03.8 OTHER SPECIFIED HYPOTHYROIDISM: Chronic | ICD-10-CM

## 2019-07-17 PROCEDURE — 76536 US EXAM OF HEAD AND NECK: CPT

## 2019-09-25 DIAGNOSIS — L23.9 ALLERGIC ECZEMA: Chronic | ICD-10-CM

## 2019-09-25 RX ORDER — PREDNISONE 10 MG/1
10 TABLET ORAL 2 TIMES DAILY
Qty: 60 TAB | Refills: 1 | Status: SHIPPED | OUTPATIENT
Start: 2019-09-25 | End: 2020-04-13

## 2019-10-04 ENCOUNTER — TELEPHONE (OUTPATIENT)
Dept: MEDICAL GROUP | Facility: MEDICAL CENTER | Age: 49
End: 2019-10-04

## 2019-10-04 DIAGNOSIS — L23.9 ALLERGIC ECZEMA: Chronic | ICD-10-CM

## 2019-10-04 DIAGNOSIS — E03.8 OTHER SPECIFIED HYPOTHYROIDISM: ICD-10-CM

## 2019-10-04 DIAGNOSIS — D72.10 EOSINOPHILIA: ICD-10-CM

## 2019-10-04 DIAGNOSIS — L65.9 ALOPECIA: Chronic | ICD-10-CM

## 2019-10-04 RX ORDER — LEVOTHYROXINE SODIUM 75 MCG
TABLET ORAL
Qty: 30 TAB | Refills: 6 | Status: SHIPPED | OUTPATIENT
Start: 2019-10-04 | End: 2019-10-07 | Stop reason: SDUPTHER

## 2019-10-07 DIAGNOSIS — E03.8 OTHER SPECIFIED HYPOTHYROIDISM: ICD-10-CM

## 2019-10-07 RX ORDER — LEVOTHYROXINE SODIUM 0.07 MG/1
75 TABLET ORAL
Qty: 30 TAB | Refills: 6 | Status: SHIPPED | OUTPATIENT
Start: 2019-10-07 | End: 2020-04-14

## 2019-11-15 RX ORDER — ALBUTEROL SULFATE 90 UG/1
1-2 AEROSOL, METERED RESPIRATORY (INHALATION) EVERY 4 HOURS PRN
Qty: 18 INHALER | Refills: 6 | Status: SHIPPED | OUTPATIENT
Start: 2019-11-15 | End: 2021-08-26 | Stop reason: SDUPTHER

## 2020-02-24 ENCOUNTER — OFFICE VISIT (OUTPATIENT)
Dept: MEDICAL GROUP | Facility: MEDICAL CENTER | Age: 50
End: 2020-02-24
Payer: COMMERCIAL

## 2020-02-24 VITALS
DIASTOLIC BLOOD PRESSURE: 80 MMHG | TEMPERATURE: 99 F | BODY MASS INDEX: 30.49 KG/M2 | OXYGEN SATURATION: 95 % | HEIGHT: 65 IN | HEART RATE: 84 BPM | WEIGHT: 183 LBS | RESPIRATION RATE: 16 BRPM | SYSTOLIC BLOOD PRESSURE: 140 MMHG

## 2020-02-24 DIAGNOSIS — G47.00 INSOMNIA, UNSPECIFIED TYPE: ICD-10-CM

## 2020-02-24 DIAGNOSIS — F43.21 GRIEF REACTION: ICD-10-CM

## 2020-02-24 PROCEDURE — 99214 OFFICE O/P EST MOD 30 MIN: CPT | Performed by: NURSE PRACTITIONER

## 2020-02-24 RX ORDER — DUPILUMAB 300 MG/2ML
INJECTION, SOLUTION SUBCUTANEOUS
COMMUNITY
Start: 2020-01-29

## 2020-02-24 RX ORDER — ALPRAZOLAM 0.25 MG/1
.25-.5 TABLET ORAL NIGHTLY PRN
Qty: 60 TAB | Refills: 1 | Status: SHIPPED | OUTPATIENT
Start: 2020-02-24 | End: 2020-03-25

## 2020-02-24 ASSESSMENT — PATIENT HEALTH QUESTIONNAIRE - PHQ9: CLINICAL INTERPRETATION OF PHQ2 SCORE: 0

## 2020-02-24 NOTE — PROGRESS NOTES
cc:  Difficulty sleeping      Subjective:     HPI:     Alis Gonzalez is a 49 y.o. female here to discuss the evaluation and management of:    Patient is here today as she has been having difficulty sleeping.  States that she has tried melatonin but not helping.  Patient also reports that her  recently  the day after [place] unexpectedly.  Patient states that she was the one who found him and it is been very traumatic for her.  States that she is not sleeping in her bed at this time.  She does have an 8-year-old daughter who has been sleeping with her in a different room of the house.  Has not sought any counseling at this time.  States that she does have family and friends to support her.  She is still processing all of the information.  She states she has a good days and bad days.      ROS:  Denies any Headache, Blurred Vision, Confusion, Chest pain,  Shortness of breath,  Abdominal pain, Changes of bowel or bladder, Lower ext edema, Fevers, Nights sweats, Weight Changes, Focal weakness or numbness.  And all other systems reviewed and are all negative.        Current Outpatient Medications:   •  DUPIXENT 300 MG/2ML Solution Prefilled Syringe injection, , Disp: , Rfl:   •  ALPRAZolam (XANAX) 0.25 MG Tab, Take 1-2 Tabs by mouth at bedtime as needed for Sleep or Anxiety for up to 30 days., Disp: 60 Tab, Rfl: 1  •  levothyroxine (SYNTHROID) 75 MCG Tab, Take 1 Tab by mouth Every morning on an empty stomach., Disp: 30 Tab, Rfl: 6  •  VENTOLIN  (90 Base) MCG/ACT Aero Soln inhalation aerosol, INHALE 1-2 PUFFS BY MOUTH EVERY FOUR HOURS AS NEEDED. (Patient not taking: Reported on 2020), Disp: 18 Inhaler, Rfl: 6  •  predniSONE (DELTASONE) 10 MG Tab, TAKE 1 TAB BY MOUTH 2 TIMES A DAY. FOR BREAK THROUGH ALLERGIC REACTIONS (Patient not taking: Reported on 2020), Disp: 60 Tab, Rfl: 1  •  ipratropium-albuterol (DUONEB) 0.5-2.5 (3) MG/3ML nebulizer solution, 3 mL by Nebulization route 4 times a day.  (Patient not taking: Reported on 2/24/2020), Disp: 120 Bullet, Rfl: 11  •  Misc. Devices Misc, One Nebulizer unit. Dx: code J45.20 (Patient not taking: Reported on 6/27/2019), Disp: 1 Units, Rfl: 0  •  beclomethasone (QVAR) 80 MCG/ACT inhaler, Inhale 1 Puff by mouth 2 times a day. (Patient not taking: Reported on 2/24/2020), Disp: 7.3 g, Rfl: 3  •  omeprazole (PRILOSEC) 20 MG delayed-release capsule, Take 1 Cap by mouth every morning before breakfast. (Patient not taking: Reported on 6/27/2019), Disp: 30 Cap, Rfl: 1  •  tazorotene (TAZORAC) 0.1 % cream, To areas on the body daily (Patient not taking: Reported on 6/27/2019), Disp: 30 g, Rfl: 3  •  betamethasone dipropionate (DIPROLENE) 0.05 % Ointment, Apply 1 Application to affected area(s) 2 Times a Day. PATIENT HAS VERY LARGE SURFACE KENNA, PLEASE GIVE 2 TUBES if POSSIBLE (Patient not taking: Reported on 2/24/2020), Disp: 90 g, Rfl: 4  •  vitamin D (CHOLECALCIFEROL) 1000 UNIT Tab, Take 4 Tabs by mouth every day., Disp: 60 Tab, Rfl:   •  Misc. Devices Misc, One blood pressure monitor and cuff (Patient not taking: Reported on 6/27/2019), Disp: 1 Units, Rfl: 0  •  montelukast (SINGULAIR) 10 MG Tab, Take 10 mg by mouth every day., Disp: , Rfl:   •  mometasone (ELOCON) 0.1 % Ointment, Apply 1 Application to affected area(s) as needed., Disp: , Rfl: 1  •  NON SPECIFIED, Take 5,000 Int'l Units by mouth every day., Disp: , Rfl:     Allergies   Allergen Reactions   • Azithromycin Rash     All over body   • Ciprofloxacin Rash     All over body   • Penicillins Rash     Rash         Past Medical History:   Diagnosis Date   • Alopecia 2015   • Asthma    • Thyroid disease      History reviewed. No pertinent surgical history.  Family History   Problem Relation Age of Onset   • Thyroid Mother         cancer-40's   • Cancer Father         skin-melanoma   • Hyperlipidemia Father    • Cancer Maternal Aunt         breast to brain   • Breast Cancer Maternal Aunt      Social History  "    Socioeconomic History   • Marital status:      Spouse name: Not on file   • Number of children: Not on file   • Years of education: Not on file   • Highest education level: Not on file   Occupational History   • Not on file   Social Needs   • Financial resource strain: Not on file   • Food insecurity     Worry: Not on file     Inability: Not on file   • Transportation needs     Medical: Not on file     Non-medical: Not on file   Tobacco Use   • Smoking status: Never Smoker   • Smokeless tobacco: Never Used   Substance and Sexual Activity   • Alcohol use: Yes     Comment: rarely   • Drug use: No   • Sexual activity: Yes     Partners: Male   Lifestyle   • Physical activity     Days per week: Not on file     Minutes per session: Not on file   • Stress: Not on file   Relationships   • Social connections     Talks on phone: Not on file     Gets together: Not on file     Attends Scientologist service: Not on file     Active member of club or organization: Not on file     Attends meetings of clubs or organizations: Not on file     Relationship status: Not on file   • Intimate partner violence     Fear of current or ex partner: Not on file     Emotionally abused: Not on file     Physically abused: Not on file     Forced sexual activity: Not on file   Other Topics Concern   • Not on file   Social History Narrative   • Not on file       Objective:     Vitals: /80   Pulse 84   Temp 37.2 °C (99 °F)   Resp 16   Ht 1.651 m (5' 5\")   Wt 83 kg (183 lb)   SpO2 95%   BMI 30.45 kg/m²    General: Alert, pleasant, visibly depressed, crying  HEENT: Normocephalic.    Skin: Warm, dry, no rashes.  Extremities: No leg edema. No discoloration  Neurological: No tremors  Psych:  Affect/mood is depressed, crying judgement is good, memory is intact, grooming is appropriate.    Assessment/Plan:     Diagnoses and all orders for this visit:    Insomnia, unspecified type  Patient is having difficulty sleeping as her  " recently  suddenly.  Have discussed counseling.  Will trial Xanax low-dose as needed for sleep. Narx check completed.  -     ALPRAZolam (XANAX) 0.25 MG Tab; Take 1-2 Tabs by mouth at bedtime as needed for Sleep or Anxiety for up to 30 days.    Grief reaction  Patient states that she will likely look into going to [facility] for therapy/support group.  Have also offered referral over to counseling.  She will touch base when she is ready for this.  -     ALPRAZolam (XANAX) 0.25 MG Tab; Take 1-2 Tabs by mouth at bedtime as needed for Sleep or Anxiety for up to 30 days.        No follow-ups on file.          Comfort GILLIS.

## 2020-04-13 ENCOUNTER — OFFICE VISIT (OUTPATIENT)
Dept: MEDICAL GROUP | Facility: MEDICAL CENTER | Age: 50
End: 2020-04-13
Payer: COMMERCIAL

## 2020-04-13 VITALS
BODY MASS INDEX: 31.16 KG/M2 | HEART RATE: 87 BPM | WEIGHT: 187 LBS | DIASTOLIC BLOOD PRESSURE: 89 MMHG | HEIGHT: 65 IN | SYSTOLIC BLOOD PRESSURE: 151 MMHG

## 2020-04-13 DIAGNOSIS — R73.03 PRE-DIABETES: ICD-10-CM

## 2020-04-13 DIAGNOSIS — R00.2 HEART PALPITATIONS: ICD-10-CM

## 2020-04-13 DIAGNOSIS — R07.9 CHEST PAIN, UNSPECIFIED TYPE: ICD-10-CM

## 2020-04-13 DIAGNOSIS — E03.8 OTHER SPECIFIED HYPOTHYROIDISM: ICD-10-CM

## 2020-04-13 DIAGNOSIS — R03.0 ELEVATED BLOOD PRESSURE READING: ICD-10-CM

## 2020-04-13 DIAGNOSIS — E78.5 DYSLIPIDEMIA: ICD-10-CM

## 2020-04-13 DIAGNOSIS — E55.9 VITAMIN D DEFICIENCY: ICD-10-CM

## 2020-04-13 PROBLEM — D72.10 EOSINOPHILIA: Chronic | Status: ACTIVE | Noted: 2019-07-03

## 2020-04-13 PROBLEM — J45.20 MILD INTERMITTENT ASTHMA: Chronic | Status: ACTIVE | Noted: 2018-01-31

## 2020-04-13 PROCEDURE — 99443 PR PHYSICIAN TELEPHONE EVALUATION 21-30 MIN: CPT | Mod: CR | Performed by: NURSE PRACTITIONER

## 2020-04-13 RX ORDER — LISINOPRIL 10 MG/1
10 TABLET ORAL DAILY
Qty: 30 TAB | Refills: 1 | Status: SHIPPED | OUTPATIENT
Start: 2020-04-13 | End: 2020-06-08

## 2020-04-13 ASSESSMENT — FIBROSIS 4 INDEX: FIB4 SCORE: 0.59

## 2020-04-13 NOTE — PROGRESS NOTES
"As a means of avoiding spread of COVID-19, this visit is being conducted by telephone. This telephone visit was initiated by the patient and they verbally consented.  Attempted a video visit although not able to do so at this time due to technical difficulties on the patient and.    Time at start of call: 938    Reason for Call: Concern for elevated blood pressure and chest pain.      Patient presents with a telephone visit today as she has been concerned about her blood pressure.  Patient states that this past Friday she was outside and was not performing any strenuous activity and started to break out any sweat in her chest felt \" funny\" somewhat discomfort.  Patient states this made her nervous and she went to sit down.  She ended up buying a blood pressure cuff this past weekend and her recordings are:  168/86, hr 102. 169/86, 93, 160/93, 144/92, 150/97.  This is considerably different than her previous readings in the office.  Patient also makes mention that she has been having heart flutters that have started approximately 1 week ago.  She states that she only has a half of a cup of caffeine in the morning.  Denies any increase in her caffeine consumption, tea, soda, energy drinks.  No change in her supplements or vitamins or any other medications.  She is a nontobacco user.  Last time she had a heart flutter was last night, approximately less than 10 seconds in length.  She also reports that she is been having some headaches and she took some Tylenol which was somewhat helpful.  Denies any vision changes, leg swelling, shortness of breath.  She does make mention that her hands do feel swollen when she clenches them.  Of note her  suddenly  from a cardiac arrest within the past 6 months.    Currently she is not having any symptoms.     /89   Pulse 87   Ht 1.651 m (5' 5\")   Wt 84.8 kg (187 lb)          Assessment and plan.     1. Chest pain, unspecified type  New episode for patient this " past weekend. Rule out coronary artery disease.  Have discussed emergent precautions if reoccurs. Of note she does have anxiety surrounding this as her  just  from MI. Need EKG, Stress test, and ECHO.  - NM-CARDIAC STRESS TEST; Future  - EC-ECHOCARDIOGRAM COMPLETE W/O CONT; Future  -EKG    2. Elevated blood pressure reading  Patient is having elevated blood pressure readings at home in the 150s and 160s systolic, elevated diastolic above 90 at times.  We will start her on low-dose lisinopril.  Have discussed possible beta-blocker such as carvedilol since she is also having complete heart palpitations that have started over the last week.  Continue taking blood pressure and record. Follow up phone visit 2 weeks with recordings.    - Comp Metabolic Panel; Future  - TSH WITH REFLEX TO FT4; Future  - MICROALBUMIN CREAT RATIO URINE; Future    - lisinopril (PRINIVIL) 10 MG Tab; Take 1 Tab by mouth every day.  Dispense: 30 Tab; Refill: 1    3. Heart palpitations  New for the patient. Last episode was yesterday. Recently started about 1 week ago.  Have discussed possible etiologies.  Recommend Holter and echocardiogram. Suspect PVC's.  Avoid excessive caffeine consumption.  - EKG  - CBC WITH DIFFERENTIAL; Future  - Holter Monitor / Event Recorder  -REFERRAL TO CARDIOLOGY    4. Pre-diabetes  Last A1c 6.6 last year.  Follow-up with repeat labs.  - HEMOGLOBIN A1C; Future    5. Dyslipidemia  The 10-year ASCVD risk score (Jacksonchel TUTTLE Jr., et al., 2013) is: 2.9%    Values used to calculate the score:      Age: 49 years      Sex: Female      Is Non- : No      Diabetic: No      Tobacco smoker: No      Systolic Blood Pressure: 151 mmHg      Is BP treated: No      HDL Cholesterol: 29 mg/dL      Total Cholesterol: 158 mg/dL  - Lipid Profile; Future  - CRP HIGH SENSITIVE (CARDIAC); Future    6. Vitamin D deficiency  - VITAMIN D,25 HYDROXY; Future    Time at end of call: 1006  Total Time Spent:   clara HOLLIDAY

## 2020-04-14 ENCOUNTER — HOSPITAL ENCOUNTER (OUTPATIENT)
Dept: LAB | Facility: MEDICAL CENTER | Age: 50
End: 2020-04-14
Attending: NURSE PRACTITIONER
Payer: COMMERCIAL

## 2020-04-14 DIAGNOSIS — R73.03 PRE-DIABETES: ICD-10-CM

## 2020-04-14 DIAGNOSIS — R00.2 HEART PALPITATIONS: ICD-10-CM

## 2020-04-14 DIAGNOSIS — E78.5 DYSLIPIDEMIA: ICD-10-CM

## 2020-04-14 DIAGNOSIS — E55.9 VITAMIN D DEFICIENCY: ICD-10-CM

## 2020-04-14 DIAGNOSIS — R03.0 ELEVATED BLOOD PRESSURE READING: ICD-10-CM

## 2020-04-14 LAB
25(OH)D3 SERPL-MCNC: 16 NG/ML (ref 30–100)
ALBUMIN SERPL BCP-MCNC: 4.6 G/DL (ref 3.2–4.9)
ALBUMIN/GLOB SERPL: 1.4 G/DL
ALP SERPL-CCNC: 91 U/L (ref 30–99)
ALT SERPL-CCNC: 40 U/L (ref 2–50)
ANION GAP SERPL CALC-SCNC: 15 MMOL/L (ref 7–16)
AST SERPL-CCNC: 26 U/L (ref 12–45)
BASOPHILS # BLD AUTO: 1.3 % (ref 0–1.8)
BASOPHILS # BLD: 0.11 K/UL (ref 0–0.12)
BILIRUB SERPL-MCNC: 0.8 MG/DL (ref 0.1–1.5)
BUN SERPL-MCNC: 14 MG/DL (ref 8–22)
CALCIUM SERPL-MCNC: 9.4 MG/DL (ref 8.5–10.5)
CHLORIDE SERPL-SCNC: 100 MMOL/L (ref 96–112)
CHOLEST SERPL-MCNC: 193 MG/DL (ref 100–199)
CO2 SERPL-SCNC: 24 MMOL/L (ref 20–33)
CREAT SERPL-MCNC: 0.53 MG/DL (ref 0.5–1.4)
CREAT UR-MCNC: 151.33 MG/DL
CRP SERPL HS-MCNC: 5.1 MG/L (ref 0–7.5)
EOSINOPHIL # BLD AUTO: 1.13 K/UL (ref 0–0.51)
EOSINOPHIL NFR BLD: 13.2 % (ref 0–6.9)
ERYTHROCYTE [DISTWIDTH] IN BLOOD BY AUTOMATED COUNT: 41.4 FL (ref 35.9–50)
EST. AVERAGE GLUCOSE BLD GHB EST-MCNC: 131 MG/DL
GLOBULIN SER CALC-MCNC: 3.2 G/DL (ref 1.9–3.5)
GLUCOSE SERPL-MCNC: 112 MG/DL (ref 65–99)
HBA1C MFR BLD: 6.2 % (ref 0–5.6)
HCT VFR BLD AUTO: 46.6 % (ref 37–47)
HDLC SERPL-MCNC: 37 MG/DL
HGB BLD-MCNC: 15.9 G/DL (ref 12–16)
IMM GRANULOCYTES # BLD AUTO: 0.01 K/UL (ref 0–0.11)
IMM GRANULOCYTES NFR BLD AUTO: 0.1 % (ref 0–0.9)
LDLC SERPL CALC-MCNC: 125 MG/DL
LYMPHOCYTES # BLD AUTO: 2.64 K/UL (ref 1–4.8)
LYMPHOCYTES NFR BLD: 30.9 % (ref 22–41)
MCH RBC QN AUTO: 30.6 PG (ref 27–33)
MCHC RBC AUTO-ENTMCNC: 34.1 G/DL (ref 33.6–35)
MCV RBC AUTO: 89.8 FL (ref 81.4–97.8)
MICROALBUMIN UR-MCNC: <1.2 MG/DL
MICROALBUMIN/CREAT UR: NORMAL MG/G (ref 0–30)
MONOCYTES # BLD AUTO: 0.57 K/UL (ref 0–0.85)
MONOCYTES NFR BLD AUTO: 6.7 % (ref 0–13.4)
NEUTROPHILS # BLD AUTO: 4.09 K/UL (ref 2–7.15)
NEUTROPHILS NFR BLD: 47.8 % (ref 44–72)
NRBC # BLD AUTO: 0 K/UL
NRBC BLD-RTO: 0 /100 WBC
PLATELET # BLD AUTO: 494 K/UL (ref 164–446)
PMV BLD AUTO: 9.1 FL (ref 9–12.9)
POTASSIUM SERPL-SCNC: 4 MMOL/L (ref 3.6–5.5)
PROT SERPL-MCNC: 7.8 G/DL (ref 6–8.2)
RBC # BLD AUTO: 5.19 M/UL (ref 4.2–5.4)
SODIUM SERPL-SCNC: 139 MMOL/L (ref 135–145)
TRIGL SERPL-MCNC: 156 MG/DL (ref 0–149)
TSH SERPL DL<=0.005 MIU/L-ACNC: 2.77 UIU/ML (ref 0.38–5.33)
WBC # BLD AUTO: 8.6 K/UL (ref 4.8–10.8)

## 2020-04-14 PROCEDURE — 80061 LIPID PANEL: CPT

## 2020-04-14 PROCEDURE — 82043 UR ALBUMIN QUANTITATIVE: CPT

## 2020-04-14 PROCEDURE — 86141 C-REACTIVE PROTEIN HS: CPT

## 2020-04-14 PROCEDURE — 80053 COMPREHEN METABOLIC PANEL: CPT

## 2020-04-14 PROCEDURE — 84443 ASSAY THYROID STIM HORMONE: CPT

## 2020-04-14 PROCEDURE — 82570 ASSAY OF URINE CREATININE: CPT

## 2020-04-14 PROCEDURE — 36415 COLL VENOUS BLD VENIPUNCTURE: CPT

## 2020-04-14 PROCEDURE — 83036 HEMOGLOBIN GLYCOSYLATED A1C: CPT

## 2020-04-14 PROCEDURE — 85025 COMPLETE CBC W/AUTO DIFF WBC: CPT

## 2020-04-14 PROCEDURE — 82306 VITAMIN D 25 HYDROXY: CPT

## 2020-04-14 RX ORDER — LEVOTHYROXINE SODIUM 0.07 MG/1
75 TABLET ORAL
Qty: 30 TAB | Refills: 6 | Status: SHIPPED | OUTPATIENT
Start: 2020-04-14 | End: 2020-10-21

## 2020-04-15 ENCOUNTER — HOSPITAL ENCOUNTER (OUTPATIENT)
Dept: CARDIOLOGY | Facility: MEDICAL CENTER | Age: 50
End: 2020-04-15
Attending: NURSE PRACTITIONER
Payer: COMMERCIAL

## 2020-04-15 DIAGNOSIS — R07.9 CHEST PAIN, UNSPECIFIED TYPE: ICD-10-CM

## 2020-04-15 LAB
LV EJECT FRACT MOD 2C 99903: 76.46
LV EJECT FRACT MOD 4C 99902: 70.81
LV EJECT FRACT MOD BP 99901: 73.77

## 2020-04-15 PROCEDURE — 93306 TTE W/DOPPLER COMPLETE: CPT | Mod: 26 | Performed by: INTERNAL MEDICINE

## 2020-04-15 PROCEDURE — 93306 TTE W/DOPPLER COMPLETE: CPT

## 2020-04-16 ENCOUNTER — TELEPHONE (OUTPATIENT)
Dept: CARDIOLOGY | Facility: MEDICAL CENTER | Age: 50
End: 2020-04-16

## 2020-04-16 ENCOUNTER — NON-PROVIDER VISIT (OUTPATIENT)
Dept: CARDIOLOGY | Facility: MEDICAL CENTER | Age: 50
End: 2020-04-16
Payer: COMMERCIAL

## 2020-04-16 DIAGNOSIS — R00.2 PALPITATIONS: ICD-10-CM

## 2020-04-16 NOTE — TELEPHONE ENCOUNTER
Patient enrolled in the 5 day Zio patch program per MG Jacobson.(enrollment under JOSUE Johnson).    >Currently pending EOS.

## 2020-04-20 ENCOUNTER — HOSPITAL ENCOUNTER (OUTPATIENT)
Dept: RADIOLOGY | Facility: MEDICAL CENTER | Age: 50
End: 2020-04-20
Attending: NURSE PRACTITIONER
Payer: COMMERCIAL

## 2020-04-20 DIAGNOSIS — R07.9 CHEST PAIN, UNSPECIFIED TYPE: ICD-10-CM

## 2020-04-20 PROCEDURE — A9502 TC99M TETROFOSMIN: HCPCS

## 2020-04-20 RX ORDER — REGADENOSON 0.08 MG/ML
INJECTION, SOLUTION INTRAVENOUS
Status: COMPLETED
Start: 2020-04-20 | End: 2020-04-20

## 2020-04-28 ENCOUNTER — TELEMEDICINE (OUTPATIENT)
Dept: MEDICAL GROUP | Facility: MEDICAL CENTER | Age: 50
End: 2020-04-28
Payer: COMMERCIAL

## 2020-04-28 DIAGNOSIS — G47.00 INSOMNIA, UNSPECIFIED TYPE: ICD-10-CM

## 2020-04-28 DIAGNOSIS — I10 ESSENTIAL HYPERTENSION: ICD-10-CM

## 2020-04-28 DIAGNOSIS — R00.2 HEART PALPITATIONS: ICD-10-CM

## 2020-04-28 DIAGNOSIS — E78.5 DYSLIPIDEMIA: ICD-10-CM

## 2020-04-28 DIAGNOSIS — E55.9 VITAMIN D INSUFFICIENCY: Chronic | ICD-10-CM

## 2020-04-28 PROCEDURE — 99214 OFFICE O/P EST MOD 30 MIN: CPT | Mod: 95,CR | Performed by: NURSE PRACTITIONER

## 2020-04-28 RX ORDER — ZOLPIDEM TARTRATE 5 MG/1
2.5-5 TABLET ORAL NIGHTLY PRN
Qty: 30 TAB | Refills: 0 | Status: SHIPPED | OUTPATIENT
Start: 2020-04-28 | End: 2020-04-28 | Stop reason: SDUPTHER

## 2020-04-28 RX ORDER — ZOLPIDEM TARTRATE 5 MG/1
2.5-5 TABLET ORAL NIGHTLY PRN
Qty: 30 TAB | Refills: 0 | Status: SHIPPED | OUTPATIENT
Start: 2020-04-28 | End: 2020-05-28

## 2020-04-28 ASSESSMENT — PAIN SCALES - GENERAL: PAINLEVEL: NO PAIN

## 2020-04-28 NOTE — PROGRESS NOTES
Telemedicine Video Visit: Established Patient   This Remote Face to Face encounter was conducted via Zoom. Given the importance of social distancing and other strategies recommended to reduce the risk of COVID-19 transmission, I am providing medical care to this patient via audio/video visit in place of an in person visit at the request of the patient. Verbal consent to telehealth, risks, benefits, and consequences were discussed. Patient retains the right to withdraw at any time. All existing confidentiality protections apply. The patient has access to all transmitted medical information. No dissemination of any patient images or information to other entities without further written consent.  Subjective:     Alis Gonzalez is a 49 y.o. female presenting for evaluation and management of:      1.  Essential hypertension  Patient has been recording her blood pressure.  States on Sunday was 139/81, Monday 137/86 and this morning is 150/88.  Denies any chest pain at this time.  Has not started taking the blood pressure medicine.  Has a hard time committing to taking something daily.  Negative stress test.  Echocardiogram with EF at 75%, mild left ventricular hypertrophy.  No valve dysfunction.    2. Heart palpitations  Have not occurred since last visit.  Suspect related to anxiety.  Recent cardiac stress test negative for ischemia. Still pending ZIO patch.    3. Insomnia, unspecified type  Not sleeping at this time.  Has tried Xanax in the past without any success.  Requesting small dose of Ambien to trial.    4. Vitamin D insufficiency  Vitamin D on the lower end.  Have recommended patient to double up on her vitamin D3.    5.  Dyslipidemia  Recent lipid panel with total cholesterol 193, triglycerides 156, HDL 37, .  Both of her parents have high cholesterol, denies any CAD, MI or CVA. The 10-year ASCVD risk score (Rhiannon DC Jr., et al., 2013) is: 2.9%      ROS Denies any recent fevers or chills. No nausea or  vomiting. No chest pains or shortness of breath.  Positive for difficulty sleeping.    Allergies   Allergen Reactions   • Azithromycin Rash     All over body   • Ciprofloxacin Rash     All over body   • Penicillins Rash     Rash         Current medicines (including changes today)  Current Outpatient Medications   Medication Sig Dispense Refill   • zolpidem (AMBIEN) 5 MG Tab Take 0.5-1 Tabs by mouth at bedtime as needed for Sleep for up to 30 days. 30 Tab 0   • levothyroxine (SYNTHROID) 75 MCG Tab TAKE 1 TAB BY MOUTH EVERY MORNING ON AN EMPTY STOMACH. 30 Tab 6   • lisinopril (PRINIVIL) 10 MG Tab Take 1 Tab by mouth every day. 30 Tab 1   • DUPIXENT 300 MG/2ML Solution Prefilled Syringe injection      • VENTOLIN  (90 Base) MCG/ACT Aero Soln inhalation aerosol INHALE 1-2 PUFFS BY MOUTH EVERY FOUR HOURS AS NEEDED. 18 Inhaler 6   • triamcinolone acetonide (KENALOG) 0.1 % Ointment APPLY TO BODY RASH TOPICALLY TWICE A DAY AS NEEDED       No current facility-administered medications for this visit.        Patient Active Problem List    Diagnosis Date Noted   • Dyslipidemia 04/30/2020   • Essential hypertension 04/30/2020   • Pre-diabetes 04/13/2020   • Secondary thrombocytosis 07/03/2019   • Fatty liver 07/03/2019   • Eosinophilia 07/03/2019   • Insomnia 01/08/2018   • Other specified hypothyroidism 01/08/2018   • Vitamin D insufficiency 01/08/2018   • Obesity (BMI 30-39.9) 01/08/2018   • Allergic eczema 01/08/2018   • Moderate persistent asthma without complication    • Alopecia 05/27/2016       Family History   Problem Relation Age of Onset   • Thyroid Mother         cancer-40's   • Cancer Father         skin-melanoma   • Hyperlipidemia Father    • Cancer Maternal Aunt         breast to brain   • Breast Cancer Maternal Aunt        She  has a past medical history of Alopecia (2015), Asthma, and Thyroid disease.  She  has no past surgical history on file.       Objective:   Vitals obtained by patient:  /88    "Resp 14   Ht 1.651 m (5' 5\")   BMI 31.12 kg/m²     Physical Exam:  Constitutional: Alert, no distress, well-groomed.  Skin: No rashes in visible areas.  Eye: Round. Conjunctiva clear, lids normal. No icterus.   ENMT: Lips pink without lesions, good dentition, moist mucous membranes. Phonation normal.  Neck: No masses, no thyromegaly. Moves freely without pain.  CV: Pulse as reported by patient  Respiratory: Unlabored respiratory effort, no cough or audible wheeze  Psych: Alert and oriented x3, normal affect and mood.       Assessment and Plan:   The following treatment plan was discussed:     1. Essential hypertension  Not well controlled this time.  Denies any headaches, vision changes.  Recommend patient to continue to monitor her blood pressures, record.  We have discussed Dash diet, weight loss, continue to avoid alcohol consumption.  She is very hesitant in taking a medication on a daily basis.  Have discussed long-term sequelae of uncontrolled hypertension.  Echocardiogram with left ventricular hypertrophy, EF at 75%.  Negative stress test for ischemia.  Follow-up with blood pressure recordings.  Have added on sleep studies.  - REFERRAL TO SLEEP STUDIES    2. Heart palpitations  Denies any recent episodes since her last office visit.  Pending ZIO patch.    3. Insomnia, unspecified type  Not controlled at this time.  Will trial small dose of Ambien.  Have discussed precautions regarding potential for addiction, avoid driving or drinking alcohol while on medication. Narx check completed.  Recommend journaling at night to help get out some of her racing thoughts.  - zolpidem (AMBIEN) 5 MG Tab; Take 0.5-1 Tabs by mouth at bedtime as needed for Sleep for up to 30 days.  Dispense: 30 Tab; Refill: 0    4. Vitamin D insufficiency  Suboptimal.  Recommend doubling her dose.    5. Dyslipidemia  Chronic.  Recommend starting a low-dose statin.  Family history of elevated cholesterol.  Recent lipid panel with total " cholesterol 193, triglycerides 156, HDL 37, .  Both of her parents have high cholesterol, denies any CAD, MI or CVA. The 10-year ASCVD risk score (Rhiannon PARAG Jr., et al., 2013) is: 2.9%.  Not interested in medication at this time.  Will continue discussed with patient.    Other orders  - triamcinolone acetonide (KENALOG) 0.1 % Ointment; APPLY TO BODY RASH TOPICALLY TWICE A DAY AS NEEDED        Follow-up: Return in about 8 weeks (around 6/23/2020).    Face to Face Video Visit:     RALPH Burt

## 2020-04-30 VITALS
SYSTOLIC BLOOD PRESSURE: 150 MMHG | BODY MASS INDEX: 31.12 KG/M2 | RESPIRATION RATE: 14 BRPM | HEIGHT: 65 IN | DIASTOLIC BLOOD PRESSURE: 88 MMHG

## 2020-04-30 PROBLEM — I10 ESSENTIAL HYPERTENSION: Chronic | Status: ACTIVE | Noted: 2020-04-30

## 2020-04-30 PROBLEM — J45.20 MILD INTERMITTENT ASTHMA: Chronic | Status: RESOLVED | Noted: 2018-01-31 | Resolved: 2020-04-30

## 2020-04-30 PROBLEM — E78.5 DYSLIPIDEMIA: Status: ACTIVE | Noted: 2020-04-30

## 2020-04-30 PROBLEM — E78.5 DYSLIPIDEMIA: Chronic | Status: ACTIVE | Noted: 2020-04-30

## 2020-04-30 PROBLEM — G47.00 INSOMNIA: Status: ACTIVE | Noted: 2018-01-08

## 2020-04-30 PROBLEM — I10 ESSENTIAL HYPERTENSION: Status: ACTIVE | Noted: 2020-04-30

## 2020-04-30 RX ORDER — TRIAMCINOLONE ACETONIDE 1 MG/G
OINTMENT TOPICAL
COMMUNITY
Start: 2020-04-05 | End: 2021-02-28

## 2020-06-08 RX ORDER — LISINOPRIL 10 MG/1
TABLET ORAL
Qty: 30 TAB | Refills: 1 | Status: SHIPPED | OUTPATIENT
Start: 2020-06-08 | End: 2021-03-03

## 2020-06-26 ENCOUNTER — SLEEP CENTER VISIT (OUTPATIENT)
Dept: SLEEP MEDICINE | Facility: MEDICAL CENTER | Age: 50
End: 2020-06-26
Payer: COMMERCIAL

## 2020-06-26 VITALS
WEIGHT: 193 LBS | OXYGEN SATURATION: 95 % | HEIGHT: 65 IN | RESPIRATION RATE: 16 BRPM | HEART RATE: 85 BPM | BODY MASS INDEX: 32.15 KG/M2 | SYSTOLIC BLOOD PRESSURE: 128 MMHG | DIASTOLIC BLOOD PRESSURE: 86 MMHG

## 2020-06-26 DIAGNOSIS — G47.33 OSA (OBSTRUCTIVE SLEEP APNEA): ICD-10-CM

## 2020-06-26 DIAGNOSIS — J45.40 MODERATE PERSISTENT ASTHMA WITHOUT COMPLICATION: Chronic | ICD-10-CM

## 2020-06-26 DIAGNOSIS — I10 ESSENTIAL HYPERTENSION: Chronic | ICD-10-CM

## 2020-06-26 DIAGNOSIS — F43.10 PTSD (POST-TRAUMATIC STRESS DISORDER): ICD-10-CM

## 2020-06-26 DIAGNOSIS — E66.9 OBESITY (BMI 30-39.9): ICD-10-CM

## 2020-06-26 DIAGNOSIS — G47.00 INSOMNIA, UNSPECIFIED TYPE: ICD-10-CM

## 2020-06-26 PROCEDURE — 99243 OFF/OP CNSLTJ NEW/EST LOW 30: CPT | Performed by: INTERNAL MEDICINE

## 2020-06-26 RX ORDER — ZOLPIDEM TARTRATE 5 MG/1
5 TABLET ORAL NIGHTLY PRN
Qty: 2 TAB | Refills: 0 | Status: CANCELLED | OUTPATIENT
Start: 2020-06-26 | End: 2020-06-28

## 2020-06-26 RX ORDER — ZOLPIDEM TARTRATE 5 MG/1
5 TABLET ORAL NIGHTLY PRN
Qty: 30 TAB | Refills: 0 | Status: SHIPPED | OUTPATIENT
Start: 2020-06-26 | End: 2020-07-26

## 2020-06-26 ASSESSMENT — FIBROSIS 4 INDEX: FIB4 SCORE: 0.42

## 2020-06-26 NOTE — PROGRESS NOTES
"Chief Complaint   Patient presents with   • Establish Care     Referred by Comfort Grijalva for insomnia       HPI: This patient is a 49 y.o. female who is referred for insomnia.  She has struggled with consistent sleep for many years however 6 months ago walked into her bedroom and found her  dead from a myocardial infarction.  Since that day she has \"relived\" that moment every time she goes to sleep.  She sleeps in a different bedroom however visualizes the event  at bedtime and has difficulty falling asleep.  She estimates a sleep latency of 2 hours.  She has tried over-the-counter sleep aids as well as Ambien and alprazolam, with minimal benefit.  She snores and has irregular breathing at night that she recorded. She has family members with obstructive sleep apnea.  She is unaware of snoring herself, however typically awakens 2-3 times a night.  She maintains consistent bedtimes of 10 PM and wake up time 7 AM.  She gets up feeling unrested and has suffered from daytime hypersomnolence.  Denies tobacco, alcohol or recreational drug use.  Her BMI is 32.      Past Medical History:   Diagnosis Date   • Alopecia 2015   • Asthma    • Daytime sleepiness    • Fatigue    • Insomnia    • Thyroid disease        Social History     Socioeconomic History   • Marital status:      Spouse name: Not on file   • Number of children: Not on file   • Years of education: Not on file   • Highest education level: Not on file   Occupational History   • Not on file   Social Needs   • Financial resource strain: Not on file   • Food insecurity     Worry: Not on file     Inability: Not on file   • Transportation needs     Medical: Not on file     Non-medical: Not on file   Tobacco Use   • Smoking status: Never Smoker   • Smokeless tobacco: Never Used   Substance and Sexual Activity   • Alcohol use: Not Currently     Comment: rarely   • Drug use: No   • Sexual activity: Yes     Partners: Male   Lifestyle   • Physical " activity     Days per week: Not on file     Minutes per session: Not on file   • Stress: Not on file   Relationships   • Social connections     Talks on phone: Not on file     Gets together: Not on file     Attends Cheondoism service: Not on file     Active member of club or organization: Not on file     Attends meetings of clubs or organizations: Not on file     Relationship status: Not on file   • Intimate partner violence     Fear of current or ex partner: Not on file     Emotionally abused: Not on file     Physically abused: Not on file     Forced sexual activity: Not on file   Other Topics Concern   • Not on file   Social History Narrative   • Not on file       Family History   Problem Relation Age of Onset   • Thyroid Mother         cancer-40's   • Cancer Father         skin-melanoma   • Hyperlipidemia Father    • Cancer Maternal Aunt         breast to brain   • Breast Cancer Maternal Aunt        Current Outpatient Medications on File Prior to Visit   Medication Sig Dispense Refill   • lisinopril (PRINIVIL) 10 MG Tab TAKE 1 TABLET BY MOUTH EVERY DAY (Patient taking differently: Takes 1/2 tablet daily) 30 Tab 1   • levothyroxine (SYNTHROID) 75 MCG Tab TAKE 1 TAB BY MOUTH EVERY MORNING ON AN EMPTY STOMACH. 30 Tab 6   • DUPIXENT 300 MG/2ML Solution Prefilled Syringe injection      • VENTOLIN  (90 Base) MCG/ACT Aero Soln inhalation aerosol INHALE 1-2 PUFFS BY MOUTH EVERY FOUR HOURS AS NEEDED. 18 Inhaler 6   • triamcinolone acetonide (KENALOG) 0.1 % Ointment APPLY TO BODY RASH TOPICALLY TWICE A DAY AS NEEDED       No current facility-administered medications on file prior to visit.        Allergies: Azithromycin; Ciprofloxacin; and Penicillins    ROS:   Constitutional: Denies fevers, chills, night sweats, fatigue or weight loss  Eyes: Denies vision loss, pain, drainage, double vision  Ears, Nose, Throat: Denies earache, difficulty hearing, tinnitus, nasal congestion, hoarseness  Cardiovascular: Denies chest  "pain, tightness, palpitations, orthopnea or edema  Respiratory: Denies shortness of breath, cough, wheezing, hemoptysis  Sleep: As in HPI  GI: Denies heartburn, dysphagia, nausea, abdominal pain, diarrhea or constipation  : Denies frequent urination, hematuria, discharge or painful urination  Musculoskeletal: Denies back pain, painful joints, sore muscles  Neurological: Denies weakness or headaches  Skin: No rashes    /86 (BP Location: Left arm, Patient Position: Sitting, BP Cuff Size: Adult)   Pulse 85   Resp 16   Ht 1.651 m (5' 5\")   Wt 87.5 kg (193 lb)   SpO2 95%     Physical Exam:  Appearance: Well-nourished, well-developed, tearful  HEENT: Normocephalic, atraumatic, white sclera, PERRLA, oropharynx clear, Mallampati 3  Neck: No adenopathy or masses  Respiratory: no intercostal retractions or accessory muscle use  Lungs auscultation: Clear to auscultation bilaterally  Cardiovascular: Regular rate rhythm. No murmurs, rubs or gallops.  No LE edema  Abdomen: soft, nondistended  Gait: Normal  Digits: No clubbing, cyanosis  Motor: No focal deficits  Orientation: Oriented to time, person and place    Diagnosis:  1. CRISELDA (obstructive sleep apnea)  Polysomnography, 4 or More    -presumptive   2. Insomnia, unspecified type  zolpidem (AMBIEN) 5 MG Tab    REFERRAL TO PSYCHOLOGY   3. PTSD (post-traumatic stress disorder)     4. Obesity (BMI 30-39.9)     5. Moderate persistent asthma without complication     6. Essential hypertension         Plan:  The patient has sleep onset and maintenance insomnia, associated with nonrestorative sleep and daytime hypersomnolence.  She is experiencing posttraumatic stress disorder from the unexpected death of her spouse 6 months ago.  She has risk factors for sleep apnea (snoring, crowded airway, obesity and family history) with high clinical suspicion for CRISELDA.  Recommend polysomnography in the sleep laboratory to assess/treat for sleep apnea.  Also recommend referral to " psychology for treatment of PTSD and for cognitive behavioral therapy for insomnia.  With treatment, her prognosis is favorable.  Return for after sleep study.

## 2020-07-25 ENCOUNTER — SLEEP STUDY (OUTPATIENT)
Dept: SLEEP MEDICINE | Facility: MEDICAL CENTER | Age: 50
End: 2020-07-25
Attending: INTERNAL MEDICINE
Payer: COMMERCIAL

## 2020-07-25 DIAGNOSIS — G47.33 OSA (OBSTRUCTIVE SLEEP APNEA): ICD-10-CM

## 2020-07-25 PROCEDURE — 95811 POLYSOM 6/>YRS CPAP 4/> PARM: CPT | Performed by: FAMILY MEDICINE

## 2020-07-27 NOTE — PROCEDURES
Clinical Comments:   The patient underwent a split night polysomnogram with a CPAP titration using the standard montage for measurement of paramaters of sleep, respiratory events, movement abnormalities, heart rate and rhythm. A Microphone was used to monitior snoring.    Interpretation:  Study start time was 10:39:42 PM. Total recording time was 4h 5.5m (245 minutes) with a total sleep time of 2h 50.0m (170 minutes) resulting in a sleep efficiency of 69.25%.  Sleep latency from the start fo the study was 00 minutes minutes and REM latency from sleep onset was 208 minutes minutes.    Respiratory:   There were 4 apneas in total consisting of 0 obstructive apneas, 0 mixed apneas, and 4 central apneas. There were 113 hypopneas in total.  The apnea index was 1.41 per hour and the hypopnea index was 39.88 per hour.  The overall AHI was 41.3, with a REM AHI of 60.00, and a supine AHI of 59.15.    Limb Movements:  There were a total of 109 periodic leg movements, of which 23 were PLMS arousals. This resulted in a PLMS index of 38.5 and a PLMS arousal index of 8.1    Oximetry:  The mean SaO2 was 91.0% for the diagnostic portion of the study, with a minimum SaO2 of 75.0%.   Treatment:    Interpretation:  Treatment recording time was 3h 57.0m (237 minutes) with a total sleep time of 3h 4.5m (184 minutes) resulting in a sleep efficiency of 77.8%.   Sleep latency from the start of treatment was 04 minutes minutes and REM latency from sleep onset was 2h 5.0m minutes.   The patient had 90 arousals in total for an arousal index of 29.3.    Respiratory:   There were 4 apneas in total consisting of 0 obstructive apneas, 4 central apneas, and 0 mixed apneas for an apnea index of 1.30.   The patient had 50 hypopneas in total, which resulted in a hypopnea index of 16.26.   The overall AHI was 17.56, with a REM AHI of 53.54, and a supine AHI of 0.00.     Limb Movements:  There were a total of 10 periodic leg movements, of which 4 were  PLMS arousals. This resulted in a PLMS index of 3.3 and a PLMS arousal index of 1.3.    Oximetry:  The mean SaO2 during treatment was 90.0%, with a minimum oxygen saturation of 79.0%.    CPAP was tried at 4cm H2O.      CPAP Titration:  Due to the significant number of obstructive respiratory events observed during the diagnostic portion of the study a CPAP titration trial was performed during the second half of the night. The CPAP pressure was initiated at 4 cm of water and the pressure and it was not increased due to mask intolerance. At this final pressure the patient was observed in non supine  REM sleep stage. The apnea hypopnea index improved to 17.5/hr with improved O2 abbi of  79%.She spent 24 min of sleep time below 89% O2 saturation Snoring was resolved.   The patient utilized medium N30 mask with heated humidification. The CPAP was well-tolerated and there was minimal air leaks.     Impression:  1.  Severe obstructive sleep apnea with AHI of 41.3/hr and O2 abbi 75 %. Due to severity of the disease she met the split study protocol. The titration started with CPAP 4 cm. The AHI improved to 17.56/hr with improved O2 abbi of 79% and average O2 saturation of 90 %.     2.  Sleep related hypoxia      Recommendations:  I recommend Auto CPAP 4-10 cm with N30 mask. Consider supplemental O2 bleed in and f/u with OPO on the recommended pressure due to residual sleep hypoxia. I also recommend 30 day compliance download to assess the efficacy to the recommended pressure, measure leak, apnea hypopnea index and compliance for further outpatient monitoring and management of CPAP therapy. In some cases alternative treatment options may prove effective in resolving sleep apnea and these options include upper airway surgery, the use of a dental orthotic or weight loss and positional therapy. Clinical correlation is required. In general patients with sleep apnea are advised to avoid alcohol and sedatives and to not operate  a motor vehicle while drowsy and are at a greater risk for cardiovascular disease.

## 2020-08-04 ENCOUNTER — TELEMEDICINE (OUTPATIENT)
Dept: SLEEP MEDICINE | Facility: MEDICAL CENTER | Age: 50
End: 2020-08-04
Payer: COMMERCIAL

## 2020-08-04 VITALS — HEIGHT: 65 IN | WEIGHT: 190 LBS | BODY MASS INDEX: 31.65 KG/M2

## 2020-08-04 DIAGNOSIS — G47.33 OSA (OBSTRUCTIVE SLEEP APNEA): ICD-10-CM

## 2020-08-04 DIAGNOSIS — I10 ESSENTIAL HYPERTENSION: ICD-10-CM

## 2020-08-04 DIAGNOSIS — G47.00 INSOMNIA, UNSPECIFIED TYPE: ICD-10-CM

## 2020-08-04 PROCEDURE — 99213 OFFICE O/P EST LOW 20 MIN: CPT | Mod: 95,CR | Performed by: NURSE PRACTITIONER

## 2020-08-04 ASSESSMENT — FIBROSIS 4 INDEX: FIB4 SCORE: 0.42

## 2020-08-04 NOTE — PROGRESS NOTES
"Telemedicine Visit: Established Patient     This evaluation was conducted via Zoom, using secure and encrypted videoconferencing technology.  The patient's identity was confirmed and verbal consent for the telemedicine encounter was obtained.      Subjective:   CC: CRISELDA f/u and sleep study results    Alis Gonzalez is a 49 y.o. female presenting for evaluation and management of CRISELDA f/u and sleep study results. PMH includes alopecia, hypothyroidism, vitamin D insufficiency, asthma, prediabetes, dyslipidemia, hypertension, obesity, fatty liver, secondary thrombocytosis, insomnia.    PSG split night study from 7/25/20 was reviewed which indicated severe obstructive sleep apnea with AHI of 41.3/hr and O2 abbi 75 %. Due to severity of the disease she met the split study protocol. The titration started with CPAP 4 cm. The AHI improved to 17.56/hr with improved O2 abbi of 79% and average O2 saturation of 90%. Sleep related hypoxia.     She has struggled with consistent sleep for many years however in 1/2020 she walked into her bedroom and found her  dead from a myocardial infarction.  Since that day she has \"relived\" that moment every time she goes to sleep.  She feels that if she can get a good night sleep this will all improve. She goes to bed between 10 and 10:15 PM and wakes up around 6:30 AM.  She does wake up frequently through the night and typically will wake up after sleeping for only 2 hours.  She often has trouble falling asleep and will periodically use Ambien with minimal benefit.  She denies morning headache or snoring.  She denies any new health problems or medications.    ROS   Denies any recent fevers or chills. No nausea or vomiting. No chest pains or shortness of breath.     Allergies   Allergen Reactions   • Azithromycin Rash     All over body   • Ciprofloxacin Rash     All over body   • Penicillins Rash     Rash         Current medicines (including changes today)  Current Outpatient " "Medications   Medication Sig Dispense Refill   • levothyroxine (SYNTHROID) 75 MCG Tab TAKE 1 TAB BY MOUTH EVERY MORNING ON AN EMPTY STOMACH. 30 Tab 6   • DUPIXENT 300 MG/2ML Solution Prefilled Syringe injection      • lisinopril (PRINIVIL) 10 MG Tab TAKE 1 TABLET BY MOUTH EVERY DAY (Patient taking differently: Takes 1/2 tablet daily) 30 Tab 1   • triamcinolone acetonide (KENALOG) 0.1 % Ointment APPLY TO BODY RASH TOPICALLY TWICE A DAY AS NEEDED     • VENTOLIN  (90 Base) MCG/ACT Aero Soln inhalation aerosol INHALE 1-2 PUFFS BY MOUTH EVERY FOUR HOURS AS NEEDED. 18 Inhaler 6     No current facility-administered medications for this visit.        Patient Active Problem List    Diagnosis Date Noted   • Dyslipidemia 04/30/2020   • Essential hypertension 04/30/2020   • Pre-diabetes 04/13/2020   • Secondary thrombocytosis 07/03/2019   • Fatty liver 07/03/2019   • Eosinophilia 07/03/2019   • Insomnia 01/08/2018   • Other specified hypothyroidism 01/08/2018   • Vitamin D insufficiency 01/08/2018   • Obesity (BMI 30-39.9) 01/08/2018   • Allergic eczema 01/08/2018   • Moderate persistent asthma without complication    • Alopecia 05/27/2016       Family History   Problem Relation Age of Onset   • Thyroid Mother         cancer-40's   • Cancer Father         skin-melanoma   • Hyperlipidemia Father    • Cancer Maternal Aunt         breast to brain   • Breast Cancer Maternal Aunt        She  has a past medical history of Alopecia (2015), Asthma, Daytime sleepiness, Fatigue, Insomnia, and Thyroid disease. She also has no past medical history of Apnea, sleep, Chills, Fever, Gasping for breath, Morning headache, Snoring, or Weight loss.  She  has no past surgical history on file.       Objective:   Ht 1.651 m (5' 5\")   Wt 86.2 kg (190 lb)   BMI 31.62 kg/m²     Physical Exam:  Constitutional: Alert, no distress, well-groomed.  Skin: No rashes in visible areas.  Eye: Round. Conjunctiva clear, lids normal. No icterus.   ENMT: " Lips pink without lesions, good dentition, moist mucous membranes. Phonation normal.  Neck:  Moves freely without pain.  CV: Pulse as reported by patient  Respiratory: Unlabored respiratory effort, no cough or audible wheeze  Psych: Alert and oriented x3, normal affect and mood.       Assessment and Plan:   The following treatment plan was discussed:     1. CRISELDA (obstructive sleep apnea)  - DME CPAP    2. Insomnia, unspecified type    3. Essential hypertension    4. BMI 31.0-31.9,adult    Discussed the cardiovascular and neuropsychiatric risks of untreated CRISELDA; including but not limited to: HTN, DM, MI, ASCVD, CVA, CHF, traffic accidents.    1. PSG split night study from 7/25/20 was reviewed which indicated severe obstructive sleep apnea with AHI of 41.3/hr and O2 abbi 75 %. Due to severity of the disease she met the split study protocol. The titration started with CPAP 4 cm. The AHI improved to 17.56/hr with improved O2 abbi of 79% and average O2 saturation of 90%. Sleep related hypoxia.   Recommendation:  Recommend Auto CPAP 4-10 cm with N30 mask. Consider supplemental O2 bleed in and f/u with OPO on the recommended pressure due to residual sleep hypoxia. In some cases alternative treatment options may prove effective in resolving sleep apnea and these options include upper airway surgery, the use of a dental orthotic or weight loss and positional therapy. Clinical correlation is required. In general patients with sleep apnea are advised to avoid alcohol and sedatives and to not operate a motor vehicle while drowsy and are at a greater risk for cardiovascular disease.  These were reviewed with the patient today.    2.  Patient is amenable to CPAP trial.  DME order ([facility] [employer]) for autoCPAP 4-10 cmH2O, mask (medium N30 mask or MOC) and supplies was provided today. Continue to clean mask and supplies weekly, and change supplies per insurance guidelines.   3.  Order OPO on autoCPAP at next office visit to  evaluate adequate O2 saturation on therapy.  4.  Encouraged daily activity and healthy diet to help with weight management.  5. Follow up with the appropriate healthcare practitioners for all other medical problems and issues.  6. Sleep hygiene discussed.     Follow-up: Return in about 3 months (around 11/4/2020).     ELIS Lima.     This dictation was created using voice recognition software. The accuracy of the dictation is limited to the abilities of the software. I expect there may be some errors of grammar and possibly content.

## 2020-08-20 ENCOUNTER — HOSPITAL ENCOUNTER (EMERGENCY)
Facility: MEDICAL CENTER | Age: 50
End: 2020-08-20
Attending: EMERGENCY MEDICINE
Payer: COMMERCIAL

## 2020-08-20 ENCOUNTER — APPOINTMENT (OUTPATIENT)
Dept: RADIOLOGY | Facility: MEDICAL CENTER | Age: 50
End: 2020-08-20
Attending: EMERGENCY MEDICINE
Payer: COMMERCIAL

## 2020-08-20 ENCOUNTER — APPOINTMENT (OUTPATIENT)
Dept: RADIOLOGY | Facility: MEDICAL CENTER | Age: 50
End: 2020-08-20
Payer: COMMERCIAL

## 2020-08-20 VITALS
TEMPERATURE: 97.9 F | DIASTOLIC BLOOD PRESSURE: 71 MMHG | WEIGHT: 190 LBS | HEART RATE: 93 BPM | BODY MASS INDEX: 31.65 KG/M2 | OXYGEN SATURATION: 97 % | HEIGHT: 65 IN | SYSTOLIC BLOOD PRESSURE: 158 MMHG | RESPIRATION RATE: 19 BRPM

## 2020-08-20 DIAGNOSIS — R07.9 CHEST PAIN, UNSPECIFIED TYPE: ICD-10-CM

## 2020-08-20 LAB
ALBUMIN SERPL BCP-MCNC: 4.8 G/DL (ref 3.2–4.9)
ALBUMIN/GLOB SERPL: 1.5 G/DL
ALP SERPL-CCNC: 102 U/L (ref 30–99)
ALT SERPL-CCNC: 39 U/L (ref 2–50)
ANION GAP SERPL CALC-SCNC: 16 MMOL/L (ref 7–16)
AST SERPL-CCNC: 26 U/L (ref 12–45)
BASOPHILS # BLD AUTO: 0.6 % (ref 0–1.8)
BASOPHILS # BLD: 0.06 K/UL (ref 0–0.12)
BILIRUB SERPL-MCNC: 0.3 MG/DL (ref 0.1–1.5)
BUN SERPL-MCNC: 17 MG/DL (ref 8–22)
CALCIUM SERPL-MCNC: 9.7 MG/DL (ref 8.5–10.5)
CHLORIDE SERPL-SCNC: 102 MMOL/L (ref 96–112)
CO2 SERPL-SCNC: 22 MMOL/L (ref 20–33)
CREAT SERPL-MCNC: 0.6 MG/DL (ref 0.5–1.4)
EKG IMPRESSION: NORMAL
EOSINOPHIL # BLD AUTO: 0.86 K/UL (ref 0–0.51)
EOSINOPHIL NFR BLD: 8 % (ref 0–6.9)
ERYTHROCYTE [DISTWIDTH] IN BLOOD BY AUTOMATED COUNT: 40.7 FL (ref 35.9–50)
GLOBULIN SER CALC-MCNC: 3.3 G/DL (ref 1.9–3.5)
GLUCOSE SERPL-MCNC: 162 MG/DL (ref 65–99)
HCT VFR BLD AUTO: 44.9 % (ref 37–47)
HGB BLD-MCNC: 15.2 G/DL (ref 12–16)
IMM GRANULOCYTES # BLD AUTO: 0.02 K/UL (ref 0–0.11)
IMM GRANULOCYTES NFR BLD AUTO: 0.2 % (ref 0–0.9)
LYMPHOCYTES # BLD AUTO: 3.11 K/UL (ref 1–4.8)
LYMPHOCYTES NFR BLD: 28.9 % (ref 22–41)
MCH RBC QN AUTO: 30.5 PG (ref 27–33)
MCHC RBC AUTO-ENTMCNC: 33.9 G/DL (ref 33.6–35)
MCV RBC AUTO: 90 FL (ref 81.4–97.8)
MONOCYTES # BLD AUTO: 0.65 K/UL (ref 0–0.85)
MONOCYTES NFR BLD AUTO: 6 % (ref 0–13.4)
NEUTROPHILS # BLD AUTO: 6.07 K/UL (ref 2–7.15)
NEUTROPHILS NFR BLD: 56.3 % (ref 44–72)
NRBC # BLD AUTO: 0 K/UL
NRBC BLD-RTO: 0 /100 WBC
PLATELET # BLD AUTO: 453 K/UL (ref 164–446)
PMV BLD AUTO: 9 FL (ref 9–12.9)
POTASSIUM SERPL-SCNC: 3.7 MMOL/L (ref 3.6–5.5)
PROT SERPL-MCNC: 8.1 G/DL (ref 6–8.2)
RBC # BLD AUTO: 4.99 M/UL (ref 4.2–5.4)
SODIUM SERPL-SCNC: 140 MMOL/L (ref 135–145)
TROPONIN T SERPL-MCNC: <6 NG/L (ref 6–19)
WBC # BLD AUTO: 10.8 K/UL (ref 4.8–10.8)

## 2020-08-20 PROCEDURE — 71045 X-RAY EXAM CHEST 1 VIEW: CPT

## 2020-08-20 PROCEDURE — 93005 ELECTROCARDIOGRAM TRACING: CPT

## 2020-08-20 PROCEDURE — 99284 EMERGENCY DEPT VISIT MOD MDM: CPT

## 2020-08-20 PROCEDURE — A9270 NON-COVERED ITEM OR SERVICE: HCPCS | Performed by: EMERGENCY MEDICINE

## 2020-08-20 PROCEDURE — 85025 COMPLETE CBC W/AUTO DIFF WBC: CPT

## 2020-08-20 PROCEDURE — 80053 COMPREHEN METABOLIC PANEL: CPT

## 2020-08-20 PROCEDURE — 700102 HCHG RX REV CODE 250 W/ 637 OVERRIDE(OP): Performed by: EMERGENCY MEDICINE

## 2020-08-20 PROCEDURE — 36415 COLL VENOUS BLD VENIPUNCTURE: CPT

## 2020-08-20 PROCEDURE — 84484 ASSAY OF TROPONIN QUANT: CPT

## 2020-08-20 PROCEDURE — 93005 ELECTROCARDIOGRAM TRACING: CPT | Performed by: EMERGENCY MEDICINE

## 2020-08-20 RX ORDER — IBUPROFEN 600 MG/1
600 TABLET ORAL ONCE
Status: COMPLETED | OUTPATIENT
Start: 2020-08-20 | End: 2020-08-20

## 2020-08-20 RX ADMIN — IBUPROFEN 600 MG: 600 TABLET, FILM COATED ORAL at 22:58

## 2020-08-20 ASSESSMENT — FIBROSIS 4 INDEX: FIB4 SCORE: 0.42

## 2020-08-21 NOTE — ED PROVIDER NOTES
ED Provider Note    CHIEF COMPLAINT  Chief Complaint   Patient presents with   • Chest Pain     got CPAP today and was trying it out and then started getting chest pain        HPI  Alis Gonzalez is a 49 y.o. female who presents for evaluation of sudden onset of chest pain and a feeling of air trapping in her lungs while trying out her CPAP machine for the first time.  Patient recently took a sleep study found she had fairly severe sleep apnea and was prescribed CPAP machine.  She was trying it out tonight after dinner around 5:45 PM and noted that the pressure was at 4.  She started feeling pain in the front of her chest below both breasts near the rib margin and a feeling that she was trapping air in her lungs and could not push it back out.  She still has this feeling currently.  She has no radiation of the pain, sweating, nausea, dizziness or lightheadedness.  She has no hemoptysis, recent fevers, chills, or shortness of breath.  She states taking a deep breath and rotating her torso makes the pain worse.    REVIEW OF SYSTEMS  Constitutional: No fevers or chills  Skin: Chronic eczema  HEENT: No sore throat, runny nose, sores, trouble swallowing, trouble speaking.  Neck: No neck pain, stiffness, or masses.  Chest: Bilateral anterior chest pain  Pulm: No shortness of breath, cough, or wheezing  Gastrointestinal: No nausea, vomiting, diarrhea, or abdominal pain.  Musculoskeletal: No recent trauma, pain, swelling, weakness  Neurologic: No sensory or motor changes. No confusion or disorientation.  Immuno: No hx of recurrent infections      PAST MEDICAL HISTORY   has a past medical history of Alopecia (2015), Asthma, Daytime sleepiness, Fatigue, Insomnia, and Thyroid disease.    SOCIAL HISTORY  Social History     Tobacco Use   • Smoking status: Never Smoker   • Smokeless tobacco: Never Used   Substance and Sexual Activity   • Alcohol use: Not Currently     Comment: rarely   • Drug use: No   • Sexual activity: Yes      "Partners: Male       SURGICAL HISTORY  patient denies any surgical history    CURRENT MEDICATIONS  Home Medications    **Home medications have not yet been reviewed for this encounter**         ALLERGIES  Allergies   Allergen Reactions   • Azithromycin Rash     All over body   • Ciprofloxacin Rash     All over body   • Penicillins Rash     Rash         PHYSICAL EXAM  VITAL SIGNS: /71   Pulse 93   Temp 36.6 °C (97.9 °F) (Temporal)   Resp 19   Ht 1.651 m (5' 5\")   Wt 86.2 kg (190 lb)   SpO2 97%   BMI 31.62 kg/m²    Gen: Alert in no apparent distress.  HEENT: No signs of trauma, Bilateral external ears normal, Nose normal. Conjunctiva normal, Non-icteric.   Neck:  No tenderness, Supple, No masses  Lymphatic: No cervical lymphadenopathy noted.   Cardiovascular: Regular rate and rhythm, no murmurs.  Capillary refill less than 3 seconds to all extremities, 2+ distal pulses.  Thorax & Lungs: Normal breath sounds, No respiratory distress, No wheezing bilateral chest rise  Abdomen: Bowel sounds normal, Soft, No tenderness, No masses, No pulsatile masses. No Guarding or rebound  Skin: Warm, Dry   Back: No bony tenderness, No CVA tenderness.   Extremities: Intact distal pulses, No edema  Neurologic: Alert , no facial droop, grossly normal coordination and strength      LABS  Results for orders placed or performed during the hospital encounter of 08/20/20   CBC with Differential   Result Value Ref Range    WBC 10.8 4.8 - 10.8 K/uL    RBC 4.99 4.20 - 5.40 M/uL    Hemoglobin 15.2 12.0 - 16.0 g/dL    Hematocrit 44.9 37.0 - 47.0 %    MCV 90.0 81.4 - 97.8 fL    MCH 30.5 27.0 - 33.0 pg    MCHC 33.9 33.6 - 35.0 g/dL    RDW 40.7 35.9 - 50.0 fL    Platelet Count 453 (H) 164 - 446 K/uL    MPV 9.0 9.0 - 12.9 fL    Neutrophils-Polys 56.30 44.00 - 72.00 %    Lymphocytes 28.90 22.00 - 41.00 %    Monocytes 6.00 0.00 - 13.40 %    Eosinophils 8.00 (H) 0.00 - 6.90 %    Basophils 0.60 0.00 - 1.80 %    Immature Granulocytes 0.20 0.00 " - 0.90 %    Nucleated RBC 0.00 /100 WBC    Neutrophils (Absolute) 6.07 2.00 - 7.15 K/uL    Lymphs (Absolute) 3.11 1.00 - 4.80 K/uL    Monos (Absolute) 0.65 0.00 - 0.85 K/uL    Eos (Absolute) 0.86 (H) 0.00 - 0.51 K/uL    Baso (Absolute) 0.06 0.00 - 0.12 K/uL    Immature Granulocytes (abs) 0.02 0.00 - 0.11 K/uL    NRBC (Absolute) 0.00 K/uL   Complete Metabolic Panel (CMP)   Result Value Ref Range    Sodium 140 135 - 145 mmol/L    Potassium 3.7 3.6 - 5.5 mmol/L    Chloride 102 96 - 112 mmol/L    Co2 22 20 - 33 mmol/L    Anion Gap 16.0 7.0 - 16.0    Glucose 162 (H) 65 - 99 mg/dL    Bun 17 8 - 22 mg/dL    Creatinine 0.60 0.50 - 1.40 mg/dL    Calcium 9.7 8.5 - 10.5 mg/dL    AST(SGOT) 26 12 - 45 U/L    ALT(SGPT) 39 2 - 50 U/L    Alkaline Phosphatase 102 (H) 30 - 99 U/L    Total Bilirubin 0.3 0.1 - 1.5 mg/dL    Albumin 4.8 3.2 - 4.9 g/dL    Total Protein 8.1 6.0 - 8.2 g/dL    Globulin 3.3 1.9 - 3.5 g/dL    A-G Ratio 1.5 g/dL   Troponin   Result Value Ref Range    Troponin T <6 6 - 19 ng/L   ESTIMATED GFR   Result Value Ref Range    GFR If African American >60 >60 mL/min/1.73 m 2    GFR If Non African American >60 >60 mL/min/1.73 m 2   EKG   Result Value Ref Range    Report       [facility] Emergency Dept.    Test Date:  2020  Pt Name:    VOLODYMYR LIM                    Department: ER  MRN:        [id number]                      Room:  Gender:     Female                       Technician: [id number]  :        1970                   Requested By:ER TRIAGE PROTOCOL  Order #:    [id number]                    Reading MD: Carlito Kelly MD    Measurements  Intervals                                Axis  Rate:       92                           P:          54  MI:         156                          QRS:        31  QRSD:       86                           T:          55  QT:         336  QTc:        416    Interpretive Statements  SINUS RHYTHM  PROBABLE LEFT ATRIAL ABNORMALITY  No previous ECG available for  comparison  Electronically Signed On 8- 22:44:01 PDT by Carlito Kelly MD         RADIOLOGY  DX-CHEST-PORTABLE (1 VIEW)   Final Result      No acute cardiopulmonary abnormality.            COURSE & MEDICAL DECISION MAKING  Patient arrives for evaluation of what appears to be chest wall pain most likely related to to her new CPAP machine.  Although the CPAP machine was only on for the patient was not used to it and may have slightly overinflated her lungs causing stress to the chest wall/pulmonary parenchyma.  There are no findings today to suggest an emergent condition as a result of this overinflation I suspect her symptoms are related to stretch in the chest wall.  She does have more pain with deep inspiration but no vital sign abnormalities or risk factors to suggest pulmonary embolism.  She did have elevated blood pressure on arrival however this is resolving spontaneously and is likely situational.  I do not feel it is related directly to her chest pain and I do not feel emergent treatment is necessary as I suspect it will drift down to a normal range when she leaves.  Patient notes that she does take blood pressure medication but has not taken it recently.  I very much doubt ACS and I feel with a heart score of 1, no further inpatient evaluation is necessary.    FINAL IMPRESSION  1. Chest pain, unspecified type        Electronically signed by: Carlito Kelly M.D., 8/20/2020 10:00 PM

## 2020-08-21 NOTE — ED NOTES
Patient requesting radiologist to recheck XRAY. This RN discussed XRAY with ERP. Per ERP, patient to d/c and XRAY discussed with patient per ERP.

## 2020-08-21 NOTE — ED TRIAGE NOTES
Alis Concepcion Held  49 y.o.  Chief Complaint   Patient presents with   • Chest Pain     got CPAP today and was trying it out and then started getting chest pain          Pt placed out in the lobby and educated on triage process. Pt advised to let staff know of any changes.

## 2020-08-21 NOTE — ED NOTES
Patient discharged home per ERP.  Discharge teaching and education discussed with patient. POC discussed.   Patient verbalized understanding of discharge teaching and education. No other questions at this time.     PIV removed.     VSS. Patient alert and oriented. Patient arranged ride for self. Able to ambulate off unit safely with steady gait. All belongings with patient at time of discharge.

## 2020-08-21 NOTE — ED NOTES
Patient to RED 12. Hooked up to monitors. VSS. PIV placed.     Patient anxious at this time. Comfort provided. Patient refuses blanket at this time.

## 2020-10-19 ENCOUNTER — APPOINTMENT (OUTPATIENT)
Dept: RADIOLOGY | Facility: MEDICAL CENTER | Age: 50
End: 2020-10-19
Attending: NURSE PRACTITIONER
Payer: COMMERCIAL

## 2020-10-21 DIAGNOSIS — E03.8 OTHER SPECIFIED HYPOTHYROIDISM: ICD-10-CM

## 2020-10-21 RX ORDER — LEVOTHYROXINE SODIUM 0.07 MG/1
75 TABLET ORAL
Qty: 90 TAB | Refills: 1 | Status: SHIPPED | OUTPATIENT
Start: 2020-10-21 | End: 2021-03-29

## 2020-10-23 ENCOUNTER — OFFICE VISIT (OUTPATIENT)
Dept: MEDICAL GROUP | Facility: MEDICAL CENTER | Age: 50
End: 2020-10-23
Payer: COMMERCIAL

## 2020-10-23 VITALS
TEMPERATURE: 98 F | HEIGHT: 65 IN | OXYGEN SATURATION: 95 % | DIASTOLIC BLOOD PRESSURE: 70 MMHG | BODY MASS INDEX: 33.49 KG/M2 | HEART RATE: 80 BPM | WEIGHT: 201 LBS | SYSTOLIC BLOOD PRESSURE: 132 MMHG

## 2020-10-23 DIAGNOSIS — Z12.4 SCREENING FOR CERVICAL CANCER: ICD-10-CM

## 2020-10-23 DIAGNOSIS — G47.00 INSOMNIA, UNSPECIFIED TYPE: ICD-10-CM

## 2020-10-23 DIAGNOSIS — G47.33 SEVERE OBSTRUCTIVE SLEEP APNEA: ICD-10-CM

## 2020-10-23 DIAGNOSIS — M79.671 RIGHT FOOT PAIN: ICD-10-CM

## 2020-10-23 PROCEDURE — 99214 OFFICE O/P EST MOD 30 MIN: CPT | Performed by: NURSE PRACTITIONER

## 2020-10-23 RX ORDER — ZOLPIDEM TARTRATE 5 MG/1
5 TABLET ORAL NIGHTLY PRN
Qty: 30 TAB | Refills: 0 | Status: SHIPPED | OUTPATIENT
Start: 2020-10-23 | End: 2021-01-14

## 2020-10-23 RX ORDER — BUSPIRONE HYDROCHLORIDE 15 MG/1
15 TABLET ORAL NIGHTLY PRN
Qty: 30 TAB | Refills: 1 | Status: SHIPPED | OUTPATIENT
Start: 2020-10-23 | End: 2020-12-21

## 2020-10-23 ASSESSMENT — FIBROSIS 4 INDEX: FIB4 SCORE: 0.48

## 2020-10-23 NOTE — PROGRESS NOTES
Chief Complaint   Patient presents with   • Foot Pain     both mainly right side, x 6 wks progressivly getting worse.       Subjective:     HPI:     Alis Gonzalez is a 50 y.o. female here to discuss the evaluation and management of:    Foot pain  Has been present for about 6 weeks.  Denies any trauma or injury.  States that her right foot primarily hurts on the right or lateral portion as well as the bottom of her foot.  States it feels swollen at times.  States when she steps down the whole foot hurts.  There are no breaks in the skin, bruising, swelling or physical deformities present at this time.  States that when she has been wearing her new socks which are tighter there is been slight relief.  States it is sore to flex her toes.  There are no color change to her feet.    Severe obstructive sleep apnea  Having a hard time wearing the CPAP.  Have strongly recommended patient to follow-up with pulmonology to discuss options or recommendations as it is very important for her to be compliant with this.  States that she will do so.    Difficulty sleeping  Patient is been having difficulty sleeping.  States that it is hard for her to stay asleep the whole night.  She is unable to wear the CPAP as it does cause her to wake up and she takes it off as it feels very constrictive.  Has tried Ambien in the past.  Interested in trialing something again.    She is due for a Pap have placed referral to gynecology.    ROS:  Denies any Headache, Blurred Vision, Confusion, Chest pain,  Shortness of breath,  Abdominal pain, Changes of bowel or bladder, Lower ext edema, Fevers, Nights sweats, Weight Changes, Focal weakness or numbness.  And all other systems reviewed and are all negative. POSITIVE FOR right foot pain        Current Outpatient Medications:   •  zolpidem (AMBIEN) 5 MG Tab, Take 1 Tab by mouth at bedtime as needed for Sleep for up to 30 days., Disp: 30 Tab, Rfl: 0  •  busPIRone (BUSPAR) 15 MG tablet, Take 1 Tab by  mouth at bedtime as needed., Disp: 30 Tab, Rfl: 1  •  levothyroxine (SYNTHROID) 75 MCG Tab, TAKE 1 TAB BY MOUTH EVERY MORNING ON AN EMPTY STOMACH., Disp: 90 Tab, Rfl: 1  •  DUPIXENT 300 MG/2ML Solution Prefilled Syringe injection, , Disp: , Rfl:   •  VENTOLIN  (90 Base) MCG/ACT Aero Soln inhalation aerosol, INHALE 1-2 PUFFS BY MOUTH EVERY FOUR HOURS AS NEEDED., Disp: 18 Inhaler, Rfl: 6  •  lisinopril (PRINIVIL) 10 MG Tab, TAKE 1 TABLET BY MOUTH EVERY DAY (Patient not taking: Reported on 10/23/2020), Disp: 30 Tab, Rfl: 1  •  triamcinolone acetonide (KENALOG) 0.1 % Ointment, APPLY TO BODY RASH TOPICALLY TWICE A DAY AS NEEDED, Disp: , Rfl:     Allergies   Allergen Reactions   • Azithromycin Rash     All over body   • Ciprofloxacin Rash     All over body   • Penicillins Rash     Rash         Past Medical History:   Diagnosis Date   • Alopecia 2015   • Asthma    • Daytime sleepiness    • Fatigue    • Insomnia    • Thyroid disease      History reviewed. No pertinent surgical history.  Family History   Problem Relation Age of Onset   • Thyroid Mother         cancer-40's   • Cancer Father         skin-melanoma   • Hyperlipidemia Father    • Cancer Maternal Aunt         breast to brain   • Breast Cancer Maternal Aunt      Social History     Socioeconomic History   • Marital status:      Spouse name: Not on file   • Number of children: Not on file   • Years of education: Not on file   • Highest education level: Not on file   Occupational History   • Not on file   Social Needs   • Financial resource strain: Not on file   • Food insecurity     Worry: Not on file     Inability: Not on file   • Transportation needs     Medical: Not on file     Non-medical: Not on file   Tobacco Use   • Smoking status: Never Smoker   • Smokeless tobacco: Never Used   Substance and Sexual Activity   • Alcohol use: Not Currently     Comment: rarely   • Drug use: No   • Sexual activity: Yes     Partners: Male   Lifestyle   • Physical  "activity     Days per week: Not on file     Minutes per session: Not on file   • Stress: Not on file   Relationships   • Social connections     Talks on phone: Not on file     Gets together: Not on file     Attends [employer] service: Not on file     Active member of club or organization: Not on file     Attends meetings of clubs or organizations: Not on file     Relationship status: Not on file   • Intimate partner violence     Fear of current or ex partner: Not on file     Emotionally abused: Not on file     Physically abused: Not on file     Forced sexual activity: Not on file   Other Topics Concern   • Not on file   Social History Narrative   • Not on file       Objective:     Vitals: /70 (BP Location: Right arm, Patient Position: Sitting, BP Cuff Size: Large adult)   Pulse 80   Temp 36.7 °C (98 °F) (Temporal)   Ht 1.651 m (5' 5\")   Wt 91.2 kg (201 lb)   SpO2 95%   Breastfeeding No   BMI 33.45 kg/m²    General: Alert, pleasant, NAD  HEENT: Normocephalic.   Skin: Warm, dry, no rashes.  Extremities: No leg edema. No discoloration right foot without any obvious deformities.  Moderate arch.  Neurological: No tremors  Psych:  Affect/mood is normal, judgement is good, memory is intact, grooming is appropriate.    Assessment/Plan:     Alis was seen today for foot pain.    Diagnoses and all orders for this visit:    Right foot pain  No physical deformity present, no bruising, no erythema.  Moderate arch present.  She does not have obvious inversion of her ankles. Very trace edema however unremarkable.  Pain with palpation of the right lateral forefront of her foot.  At this time it appears as musculoskeletal. Less likely fracture.  Recommend wearing a ankle support to help with compression of the area that is causing her discomfort.  She will follow back up in 3 to 4 weeks if symptoms persist or worsen.    Insomnia, unspecified type  Having a difficult time sleeping.  This is been an ongoing problem for the " patient.  Somewhat worsened when trying to wear the CPAP.  Recommend trial of buspirone at night to see if that can help with anxiety while wearing the CPAP.  If this is not effective she may trial Ambien.  Caution regarding side effects of Ambien, no driving or drinking alcohol on medications.  -     zolpidem (AMBIEN) 5 MG Tab; Take 1 Tab by mouth at bedtime as needed for Sleep for up to 30 days.  -     busPIRone (BUSPAR) 15 MG tablet; Take 1 Tab by mouth at bedtime as needed.    Severe obstructive sleep apnea  Having a hard time with wearing the device.  Recommend follow-up with pulmonology to discuss possible change in headgear.    Screening for cervical cancer  -     REFERRAL TO GYNECOLOGY              No follow-ups on file.          Comfort GILLIS.

## 2020-10-29 ENCOUNTER — APPOINTMENT (OUTPATIENT)
Dept: RADIOLOGY | Facility: MEDICAL CENTER | Age: 50
End: 2020-10-29
Attending: NURSE PRACTITIONER
Payer: COMMERCIAL

## 2020-10-29 DIAGNOSIS — Z12.31 VISIT FOR SCREENING MAMMOGRAM: ICD-10-CM

## 2020-11-10 ENCOUNTER — APPOINTMENT (OUTPATIENT)
Dept: RADIOLOGY | Facility: MEDICAL CENTER | Age: 50
End: 2020-11-10
Attending: NURSE PRACTITIONER
Payer: COMMERCIAL

## 2020-11-21 ENCOUNTER — HOSPITAL ENCOUNTER (OUTPATIENT)
Dept: RADIOLOGY | Facility: MEDICAL CENTER | Age: 50
End: 2020-11-21
Attending: NURSE PRACTITIONER
Payer: COMMERCIAL

## 2020-11-21 DIAGNOSIS — Z12.31 VISIT FOR SCREENING MAMMOGRAM: ICD-10-CM

## 2020-11-21 PROCEDURE — 77067 SCR MAMMO BI INCL CAD: CPT

## 2020-12-20 DIAGNOSIS — G47.00 INSOMNIA, UNSPECIFIED TYPE: ICD-10-CM

## 2020-12-21 RX ORDER — BUSPIRONE HYDROCHLORIDE 15 MG/1
TABLET ORAL
Qty: 30 TAB | Refills: 1 | Status: SHIPPED | OUTPATIENT
Start: 2020-12-21 | End: 2021-01-05

## 2021-01-04 DIAGNOSIS — G47.00 INSOMNIA, UNSPECIFIED TYPE: ICD-10-CM

## 2021-01-05 RX ORDER — BUSPIRONE HYDROCHLORIDE 15 MG/1
15 TABLET ORAL DAILY
Qty: 90 TAB | Refills: 1 | Status: SHIPPED | OUTPATIENT
Start: 2021-01-05 | End: 2021-02-24

## 2021-01-14 DIAGNOSIS — G47.00 INSOMNIA, UNSPECIFIED TYPE: ICD-10-CM

## 2021-01-14 RX ORDER — ZOLPIDEM TARTRATE 5 MG/1
5 TABLET ORAL NIGHTLY PRN
Qty: 30 TAB | Refills: 0 | Status: SHIPPED | OUTPATIENT
Start: 2021-01-14 | End: 2021-03-29

## 2021-01-26 ENCOUNTER — OFFICE VISIT (OUTPATIENT)
Dept: URGENT CARE | Facility: PHYSICIAN GROUP | Age: 51
End: 2021-01-26
Payer: COMMERCIAL

## 2021-01-26 ENCOUNTER — HOSPITAL ENCOUNTER (OUTPATIENT)
Facility: MEDICAL CENTER | Age: 51
End: 2021-01-26
Attending: OBSTETRICS & GYNECOLOGY
Payer: COMMERCIAL

## 2021-01-26 VITALS
BODY MASS INDEX: 34.29 KG/M2 | OXYGEN SATURATION: 96 % | RESPIRATION RATE: 14 BRPM | HEART RATE: 88 BPM | WEIGHT: 205.8 LBS | DIASTOLIC BLOOD PRESSURE: 118 MMHG | TEMPERATURE: 98.7 F | SYSTOLIC BLOOD PRESSURE: 182 MMHG | HEIGHT: 65 IN

## 2021-01-26 DIAGNOSIS — R03.0 ELEVATED BLOOD PRESSURE READING: ICD-10-CM

## 2021-01-26 DIAGNOSIS — H16.001 ULCER OF RIGHT CORNEA: ICD-10-CM

## 2021-01-26 LAB
ESTRADIOL SERPL-MCNC: 15.4 PG/ML
FSH SERPL-ACNC: 43.8 MIU/ML
HCV AB SER QL: NORMAL
HIV 1+2 AB+HIV1 P24 AG SERPL QL IA: NORMAL
TREPONEMA PALLIDUM IGG+IGM AB [PRESENCE] IN SERUM OR PLASMA BY IMMUNOASSAY: NORMAL

## 2021-01-26 PROCEDURE — 86694 HERPES SIMPLEX NES ANTBDY: CPT

## 2021-01-26 PROCEDURE — 86780 TREPONEMA PALLIDUM: CPT

## 2021-01-26 PROCEDURE — 87389 HIV-1 AG W/HIV-1&-2 AB AG IA: CPT

## 2021-01-26 PROCEDURE — 82670 ASSAY OF TOTAL ESTRADIOL: CPT

## 2021-01-26 PROCEDURE — 83001 ASSAY OF GONADOTROPIN (FSH): CPT

## 2021-01-26 PROCEDURE — 86803 HEPATITIS C AB TEST: CPT

## 2021-01-26 PROCEDURE — 99214 OFFICE O/P EST MOD 30 MIN: CPT | Performed by: PHYSICIAN ASSISTANT

## 2021-01-26 PROCEDURE — 86695 HERPES SIMPLEX TYPE 1 TEST: CPT

## 2021-01-26 PROCEDURE — 86696 HERPES SIMPLEX TYPE 2 TEST: CPT

## 2021-01-26 ASSESSMENT — FIBROSIS 4 INDEX: FIB4 SCORE: 0.48

## 2021-01-26 ASSESSMENT — ENCOUNTER SYMPTOMS
NAUSEA: 0
BLURRED VISION: 0
MUSCULOSKELETAL NEGATIVE: 1
FOREIGN BODY SENSATION: 0
DIARRHEA: 0
DOUBLE VISION: 0
VOMITING: 0
SHORTNESS OF BREATH: 0
EYE DISCHARGE: 0
DIZZINESS: 0
EYE PAIN: 1
ABDOMINAL PAIN: 0
CHILLS: 0
EYE REDNESS: 1
PHOTOPHOBIA: 1
FEVER: 0

## 2021-01-26 ASSESSMENT — VISUAL ACUITY
OS_CC: 20/30
OD_CC: 20/25

## 2021-01-26 NOTE — PROGRESS NOTES
"Subjective:      Alis Gonzalez is a 50 y.o. female who presents with Eye Problem (x1 day right eye, pain with light, red, can see cloud in eye, may have torn or scratched )            Eye Problem   The right eye is affected. This is a new problem. The current episode started today. The problem occurs constantly. The problem has been unchanged. There was no injury mechanism. The pain is at a severity of 8/10. The pain is severe. There is no known exposure to pink eye. She does not wear contacts. Associated symptoms include eye redness and photophobia. Pertinent negatives include no blurred vision, eye discharge, double vision, fever, foreign body sensation, nausea or vomiting. She has tried eye drops for the symptoms. The treatment provided no relief.     Patient presents to urgent care reporting acute onset of right eye pain starting yesterday morning when she first woke up, with associated eye redness and photophobia. She denies change in vision, foreign body sensation, or discharge. She wears corrective glasses but not contact lenses. No recent trauma.       Review of Systems   Constitutional: Negative for chills and fever.   HENT: Negative for congestion and ear pain.    Eyes: Positive for photophobia, pain and redness. Negative for blurred vision, double vision and discharge.   Respiratory: Negative for shortness of breath.    Cardiovascular: Negative for chest pain.   Gastrointestinal: Negative for abdominal pain, diarrhea, nausea and vomiting.   Genitourinary: Negative.    Musculoskeletal: Negative.    Skin: Negative for rash.   Neurological: Negative for dizziness.        Objective:     BP (!) 182/118 (BP Location: Right arm, Patient Position: Sitting, BP Cuff Size: Adult)   Pulse 88   Temp 37.1 °C (98.7 °F) (Temporal)   Resp 14   Ht 1.651 m (5' 5\")   Wt 93.4 kg (205 lb 12.8 oz)   SpO2 96%   BMI 34.25 kg/m²      Physical Exam  Vitals signs and nursing note reviewed.   Constitutional:       General: " She is not in acute distress.     Appearance: Normal appearance. She is well-developed. She is not diaphoretic.   HENT:      Head: Normocephalic and atraumatic.      Right Ear: External ear normal.      Left Ear: External ear normal.   Eyes:      General: Lids are normal. Lids are everted, no foreign bodies appreciated.         Right eye: No discharge.         Left eye: No discharge.      Extraocular Movements: Extraocular movements intact.      Conjunctiva/sclera:      Right eye: Right conjunctiva is injected. No chemosis, exudate or hemorrhage.     Left eye: Left conjunctiva is not injected. No chemosis, exudate or hemorrhage.     Pupils: Pupils are equal, round, and reactive to light.        Comments: Corneal ulceration noted over inferior aspect of iris.   Neck:      Musculoskeletal: Normal range of motion.   Cardiovascular:      Rate and Rhythm: Normal rate.   Pulmonary:      Effort: Pulmonary effort is normal.   Musculoskeletal: Normal range of motion.   Skin:     General: Skin is warm and dry.   Neurological:      Mental Status: She is alert and oriented to person, place, and time.   Psychiatric:         Behavior: Behavior normal.          PMH:  has a past medical history of Alopecia (2015), Asthma, Daytime sleepiness, Fatigue, Insomnia, and Thyroid disease. She also has no past medical history of Apnea, sleep, Chills, Fever, Gasping for breath, Morning headache, Snoring, or Weight loss.  MEDS:   Current Outpatient Medications:   •  zolpidem (AMBIEN) 5 MG Tab, TAKE 1 TAB BY MOUTH AT BEDTIME AS NEEDED FOR SLEEP FOR UP TO 30 DAYS., Disp: 30 Tab, Rfl: 0  •  levothyroxine (SYNTHROID) 75 MCG Tab, TAKE 1 TAB BY MOUTH EVERY MORNING ON AN EMPTY STOMACH., Disp: 90 Tab, Rfl: 1  •  DUPIXENT 300 MG/2ML Solution Prefilled Syringe injection, , Disp: , Rfl:   •  VENTOLIN  (90 Base) MCG/ACT Aero Soln inhalation aerosol, INHALE 1-2 PUFFS BY MOUTH EVERY FOUR HOURS AS NEEDED., Disp: 18 Inhaler, Rfl: 6  •  busPIRone  (BUSPAR) 15 MG tablet, Take 1 Tab by mouth every day. (Patient not taking: Reported on 1/26/2021), Disp: 90 Tab, Rfl: 1  •  lisinopril (PRINIVIL) 10 MG Tab, TAKE 1 TABLET BY MOUTH EVERY DAY (Patient not taking: Reported on 10/23/2020), Disp: 30 Tab, Rfl: 1  •  triamcinolone acetonide (KENALOG) 0.1 % Ointment, APPLY TO BODY RASH TOPICALLY TWICE A DAY AS NEEDED, Disp: , Rfl:   ALLERGIES:   Allergies   Allergen Reactions   • Azithromycin Rash     All over body   • Ciprofloxacin Rash     All over body   • Penicillins Rash     Rash       SURGHX: History reviewed. No pertinent surgical history.  SOCHX:  reports that she has never smoked. She has never used smokeless tobacco. She reports previous alcohol use. She reports that she does not use drugs.  FH: family history includes Breast Cancer in her maternal aunt; Cancer in her father and maternal aunt; Hyperlipidemia in her father; Thyroid in her mother.       Assessment/Plan:        1. Ulcer of right cornea    Recommend patient be evaluated urgently today by an eye specialist. She is agreeable to this plan. She is re-directed to [facility] for evaluation today. She left urgent care in stable condition.     2. Elevated blood pressure reading     Discussed elevated blood pressure readings with patient, she states she was started on Lisinopril by her PCP, but she doesn't take the medication. Advised to go take medication immediately, and to monitor blood pressure at home. Advised to keep daily at home blood pressure journal and follow up with her PCP in 2-4 weeks for ongoing management of HTN.

## 2021-01-26 NOTE — LETTER
January 26, 2021         Patient: Alis Gonzalez   YOB: 1970   Date of Visit: 1/26/2021           To Whom it May Concern:    Alis Gonzalez was seen in my clinic on 1/26/2021. Please excuse her absence today, 1/26/21.     If you have any questions or concerns, please don't hesitate to call.        Sincerely,           Karie Silver P.A.-C.  Electronically Signed

## 2021-01-28 LAB
HSV1 GG IGG SER-ACNC: 3.67 IV
HSV1+2 IGG SER IA-ACNC: 15.2 IV
HSV2 GG IGG SER-ACNC: 0.11 IV

## 2021-02-16 ENCOUNTER — HOSPITAL ENCOUNTER (OUTPATIENT)
Dept: RADIOLOGY | Facility: MEDICAL CENTER | Age: 51
End: 2021-02-16
Attending: PHYSICIAN ASSISTANT
Payer: COMMERCIAL

## 2021-02-16 ENCOUNTER — OFFICE VISIT (OUTPATIENT)
Dept: URGENT CARE | Facility: PHYSICIAN GROUP | Age: 51
End: 2021-02-16
Payer: COMMERCIAL

## 2021-02-16 VITALS
HEIGHT: 65 IN | TEMPERATURE: 98 F | SYSTOLIC BLOOD PRESSURE: 158 MMHG | HEART RATE: 84 BPM | OXYGEN SATURATION: 93 % | DIASTOLIC BLOOD PRESSURE: 92 MMHG | WEIGHT: 201 LBS | BODY MASS INDEX: 33.49 KG/M2 | RESPIRATION RATE: 16 BRPM

## 2021-02-16 DIAGNOSIS — R10.11 RUQ PAIN: ICD-10-CM

## 2021-02-16 DIAGNOSIS — I10 ELEVATED BLOOD PRESSURE READING IN OFFICE WITH DIAGNOSIS OF HYPERTENSION: ICD-10-CM

## 2021-02-16 DIAGNOSIS — R14.0 ABDOMINAL BLOATING: ICD-10-CM

## 2021-02-16 DIAGNOSIS — K59.00 CONSTIPATION, UNSPECIFIED CONSTIPATION TYPE: ICD-10-CM

## 2021-02-16 DIAGNOSIS — I10 ESSENTIAL HYPERTENSION: ICD-10-CM

## 2021-02-16 PROCEDURE — 99213 OFFICE O/P EST LOW 20 MIN: CPT | Performed by: PHYSICIAN ASSISTANT

## 2021-02-16 PROCEDURE — 76705 ECHO EXAM OF ABDOMEN: CPT

## 2021-02-16 ASSESSMENT — FIBROSIS 4 INDEX: FIB4 SCORE: 0.48

## 2021-02-16 ASSESSMENT — ENCOUNTER SYMPTOMS
HEARTBURN: 0
DIZZINESS: 0
CHILLS: 0
HEADACHES: 0
BELCHING: 0
NAUSEA: 1
CONSTIPATION: 1
FEVER: 0
BLOOD IN STOOL: 0
ANOREXIA: 0
ABDOMINAL PAIN: 1
DIARRHEA: 0
SHORTNESS OF BREATH: 0
COUGH: 0
VOMITING: 0

## 2021-02-16 NOTE — PROGRESS NOTES
Subjective:      Alis Gonzalez is a 50 y.o. female who presents with Other (pain in RUQ when she  sneezes or coughs x 2.5 weeks)    Medications:    • busPIRone  • Dupixent Sosy  • levothyroxine Tabs  • lisinopril Tabs  • triamcinolone acetonide Oint  • Ventolin HFA Aers    Allergies: Azithromycin, Ciprofloxacin, and Penicillins    Problem List: Alis Gonzalez has Alopecia; Insomnia; Other specified hypothyroidism; Vitamin D insufficiency; Obesity (BMI 30-39.9); Allergic eczema; Moderate persistent asthma without complication; Secondary thrombocytosis; Fatty liver; Eosinophilia; Pre-diabetes; Dyslipidemia; Essential hypertension; and Severe obstructive sleep apnea on their problem list.    Surgical History:  No past surgical history on file.    Past Social Hx: Alis Gonzalez  reports that she has never smoked. She has never used smokeless tobacco. She reports previous alcohol use. She reports that she does not use drugs.     Past Family Hx:  Alis Gonzalez family history includes Breast Cancer in her maternal aunt; Cancer in her father and maternal aunt; Hyperlipidemia in her father; Thyroid in her mother.     Problem list, medications, and allergies reviewed by myself today in Epic.          Patient presents with:  RUQ abdominal pain , bloating, fullness x2 and half weeks.  Patient has also noticed that she has pain under her right ribs when she coughs or sneezes, or strains to have a bowel movement.  Patient denies history of known gallstones or gallbladder disease.  Patient has not been taking any over-the-counter medication for her symptoms.  Patient denies fever, chills, chest pain, shortness of breath or urinary symptoms.         RUQ Pain  This is a new problem. The current episode started 1 to 4 weeks ago. The onset quality is gradual. The problem occurs daily. The problem has been gradually worsening. The pain is located in the RUQ. The pain is moderate. The quality of the pain is aching and sharp. The  "abdominal pain does not radiate. Associated symptoms include constipation and nausea (occasional). Pertinent negatives include no anorexia, belching, diarrhea, fever, headaches, melena or vomiting. The pain is aggravated by eating, certain positions and coughing. The pain is relieved by certain positions. She has tried nothing for the symptoms. The treatment provided no relief. There is no history of abdominal surgery, gallstones or GERD.       Review of Systems   Constitutional: Negative for chills and fever.   Respiratory: Negative for cough and shortness of breath.    Cardiovascular: Negative for chest pain.   Gastrointestinal: Positive for abdominal pain (RUQ), constipation and nausea (occasional). Negative for anorexia, blood in stool, diarrhea, heartburn, melena and vomiting.   Neurological: Negative for dizziness and headaches.   All other systems reviewed and are negative.         Objective:     /92 (BP Location: Left arm, Patient Position: Sitting, BP Cuff Size: Adult)   Pulse 84   Temp 36.7 °C (98 °F) (Temporal)   Resp 16   Ht 1.651 m (5' 5\")   Wt 91.2 kg (201 lb)   SpO2 93%   BMI 33.45 kg/m²      Physical Exam  Vitals and nursing note reviewed.   Constitutional:       General: She is not in acute distress.     Appearance: Normal appearance. She is well-developed. She is obese. She is not ill-appearing or toxic-appearing.   HENT:      Head: Normocephalic and atraumatic.      Right Ear: Tympanic membrane normal.      Left Ear: Tympanic membrane normal.      Nose: Nose normal.      Mouth/Throat:      Lips: Pink.      Mouth: Mucous membranes are moist.      Pharynx: Oropharynx is clear. Uvula midline.   Eyes:      Extraocular Movements: Extraocular movements intact.      Conjunctiva/sclera: Conjunctivae normal.      Pupils: Pupils are equal, round, and reactive to light.   Cardiovascular:      Rate and Rhythm: Normal rate and regular rhythm.      Pulses: Normal pulses.      Heart sounds: Normal " heart sounds.   Pulmonary:      Effort: Pulmonary effort is normal.      Breath sounds: Normal breath sounds.   Abdominal:      General: Abdomen is protuberant. Bowel sounds are normal.      Palpations: Abdomen is soft.      Tenderness: There is abdominal tenderness in the right upper quadrant. Positive signs include Johnson's sign.       Musculoskeletal:         General: Normal range of motion.      Cervical back: Normal range of motion and neck supple.   Skin:     General: Skin is warm and dry.      Capillary Refill: Capillary refill takes less than 2 seconds.   Neurological:      General: No focal deficit present.      Mental Status: She is alert and oriented to person, place, and time.      Cranial Nerves: No cranial nerve deficit.      Motor: Motor function is intact.      Coordination: Coordination is intact.      Gait: Gait normal.   Psychiatric:         Mood and Affect: Mood normal.                 Assessment/Plan:         1. RUQ pain  US-RUQ   2. Abdominal bloating  US-RUQ   3. Constipation, unspecified constipation type       Patient was evaluated in clinic today while wearing appropriate personal protective equipment.      US ordered , will have US completed later today, will call with results, and plan.     PT is aware that she is not to eat/drink anything prior to US. Will call tomorrow with results.     PT should follow up with PCP in 1-2 days for re-evaluation if symptoms have not improved.      Discussed red flags and reasons to return to UC or ED.      Pt and/or family verbalized understanding of diagnosis and follow up instructions and was offered informational handout on diagnosis.  PT discharged.          None known

## 2021-02-17 ENCOUNTER — TELEPHONE (OUTPATIENT)
Dept: URGENT CARE | Facility: CLINIC | Age: 51
End: 2021-02-17

## 2021-02-17 NOTE — TELEPHONE ENCOUNTER
I discussed pt US results with her, negative except for fatty liver, which is not new (radiology compared with CT 2018).  Pt verbalized understanding of these results.  Pt already has a follow-up appointment with primary care provider in 7 days.  Patient was instructed to keep this appointment and verbalized that she would do so.  Patient was appreciative for the call.

## 2021-02-24 ENCOUNTER — OFFICE VISIT (OUTPATIENT)
Dept: MEDICAL GROUP | Facility: MEDICAL CENTER | Age: 51
End: 2021-02-24
Payer: COMMERCIAL

## 2021-02-24 VITALS
TEMPERATURE: 98.6 F | OXYGEN SATURATION: 95 % | DIASTOLIC BLOOD PRESSURE: 78 MMHG | SYSTOLIC BLOOD PRESSURE: 132 MMHG | HEART RATE: 85 BPM | HEIGHT: 65 IN | WEIGHT: 205.91 LBS | BODY MASS INDEX: 34.31 KG/M2

## 2021-02-24 DIAGNOSIS — R73.03 PRE-DIABETES: ICD-10-CM

## 2021-02-24 DIAGNOSIS — I10 ESSENTIAL HYPERTENSION: ICD-10-CM

## 2021-02-24 DIAGNOSIS — E78.5 DYSLIPIDEMIA: ICD-10-CM

## 2021-02-24 DIAGNOSIS — R10.9 ABDOMINAL PAIN, UNSPECIFIED ABDOMINAL LOCATION: ICD-10-CM

## 2021-02-24 PROCEDURE — 99213 OFFICE O/P EST LOW 20 MIN: CPT | Performed by: NURSE PRACTITIONER

## 2021-02-24 RX ORDER — MOXIFLOXACIN 5 MG/ML
SOLUTION/ DROPS OPHTHALMIC
COMMUNITY
Start: 2021-01-26 | End: 2021-02-24

## 2021-02-24 RX ORDER — PREDNISOLONE ACETATE 10 MG/ML
SUSPENSION/ DROPS OPHTHALMIC
COMMUNITY
Start: 2021-01-26 | End: 2021-02-24

## 2021-02-24 RX ORDER — OMEPRAZOLE 20 MG/1
20 CAPSULE, DELAYED RELEASE ORAL DAILY
Qty: 14 CAPSULE | Refills: 0 | Status: SHIPPED | OUTPATIENT
Start: 2021-02-24 | End: 2022-03-02

## 2021-02-24 ASSESSMENT — FIBROSIS 4 INDEX: FIB4 SCORE: 0.48

## 2021-02-24 ASSESSMENT — PATIENT HEALTH QUESTIONNAIRE - PHQ9: CLINICAL INTERPRETATION OF PHQ2 SCORE: 0

## 2021-02-24 NOTE — PROGRESS NOTES
Chief Complaint   Patient presents with   • Abdominal Pain     RUQ x 1 mth       Subjective:     HPI:     Alis Gonzalez is a 50 y.o. female here to discuss the evaluation and management of:    Patient presents today with complaints of having right upper quadrant pain.  Describes bloating.  She is having bowel movements daily however she has noticed there is been a change in the shape of her bowel movements.  There is no blood or mucus.  Has been taking Dulcolax.  No cramping.  When she cough/sneezes has pain in RUQ.  Otherwise no excessive belching, fevers, night sweats, weight loss.    2. Essential hypertension  Reports compliance with lisinopril. No CP or SOB.     3. Dyslipidemia  Historically present. LDL at 125. The 10-year ASCVD risk score (Rhiannon DC Jr., et al., 2013) is: 3.2%  Not on statin therapy.    4. Pre-diabetes  Historically present. Last A1c 6.2%.       ROS:  Denies any Headache, Blurred Vision, Confusion, Chest pain,  Shortness of breath,  Abdominal pain, Changes of bowel or bladder, Lower ext edema, Fevers, Nights sweats, Weight Changes, Focal weakness or numbness.  And all other systems reviewed and are all negative. POSITIVE FOR abdomen pain        Current Outpatient Medications:   •  omeprazole (PRILOSEC) 20 MG delayed-release capsule, Take 1 capsule by mouth every day., Disp: 14 capsule, Rfl: 0  •  levothyroxine (SYNTHROID) 75 MCG Tab, TAKE 1 TAB BY MOUTH EVERY MORNING ON AN EMPTY STOMACH., Disp: 90 Tab, Rfl: 1  •  lisinopril (PRINIVIL) 10 MG Tab, TAKE 1 TABLET BY MOUTH EVERY DAY, Disp: 30 Tab, Rfl: 1  •  DUPIXENT 300 MG/2ML Solution Prefilled Syringe injection, , Disp: , Rfl:   •  VENTOLIN  (90 Base) MCG/ACT Aero Soln inhalation aerosol, INHALE 1-2 PUFFS BY MOUTH EVERY FOUR HOURS AS NEEDED., Disp: 18 Inhaler, Rfl: 6  •  triamcinolone acetonide (KENALOG) 0.1 % Ointment, APPLY TO BODY RASH TOPICALLY TWICE A DAY AS NEEDED, Disp: , Rfl:     Allergies   Allergen Reactions   • Azithromycin  Rash     All over body   • Ciprofloxacin Rash     All over body   • Penicillins Rash     Rash         Past Medical History:   Diagnosis Date   • Alopecia 2015   • Asthma    • Daytime sleepiness    • Fatigue    • Insomnia    • Thyroid disease      History reviewed. No pertinent surgical history.  Family History   Problem Relation Age of Onset   • Thyroid Mother         cancer-40's   • Cancer Father         skin-melanoma   • Hyperlipidemia Father    • Cancer Maternal Aunt         breast to brain   • Breast Cancer Maternal Aunt      Social History     Socioeconomic History   • Marital status: Single     Spouse name: Not on file   • Number of children: Not on file   • Years of education: Not on file   • Highest education level: Not on file   Occupational History   • Not on file   Tobacco Use   • Smoking status: Never Smoker   • Smokeless tobacco: Never Used   Substance and Sexual Activity   • Alcohol use: Not Currently     Comment: rarely   • Drug use: No   • Sexual activity: Yes     Partners: Male   Other Topics Concern   • Not on file   Social History Narrative   • Not on file     Social Determinants of Health     Financial Resource Strain:    • Difficulty of Paying Living Expenses:    Food Insecurity:    • Worried About Running Out of Food in the Last Year:    • Ran Out of Food in the Last Year:    Transportation Needs:    • Lack of Transportation (Medical):    • Lack of Transportation (Non-Medical):    Physical Activity:    • Days of Exercise per Week:    • Minutes of Exercise per Session:    Stress:    • Feeling of Stress :    Social Connections:    • Frequency of Communication with Friends and Family:    • Frequency of Social Gatherings with Friends and Family:    • Attends [employer] Services:    • Active Member of Clubs or Organizations:    • Attends Club or Organization Meetings:    • Marital Status:    Intimate Partner Violence:    • Fear of Current or Ex-Partner:    • Emotionally Abused:    • Physically  "Abused:    • Sexually Abused:        Objective:     Vitals: /78 (BP Location: Right arm, Patient Position: Sitting, BP Cuff Size: Adult)   Pulse 85   Temp 37 °C (98.6 °F) (Temporal)   Ht 1.651 m (5' 5\")   Wt 93.4 kg (205 lb 14.6 oz)   SpO2 95%   BMI 34.27 kg/m²    General: Alert, pleasant, NAD  HEENT: Normocephalic.   Abdomen: rounded, slight discomfort on right upper, hypobs  Skin: Warm, dry, no rashes.  Extremities: No leg edema. No discoloration  Neurological: No tremors  Psych:  Affect/mood is normal, judgement is good, memory is intact, grooming is appropriate.    Assessment/Plan:     Alis was seen today for abdominal pain.    Diagnoses and all orders for this visit:    Abdominal pain, unspecified abdominal location  New problem for the patient. NAD. VSS. No rebound tenderness. Suspect gastritis/gerd vs constipation. Colonoscopy in 2019 with diverticulosis of sigmoid colon. Trial of Omeprazole and mirilax. Avoid trigger foods. Emergent precautions discussed. Follow up as needed.  omeprazole (PRILOSEC) 20 MG delayed-release capsule; Take 1 capsule by mouth every day.    Essential hypertension  Stable on current regimen. No CP or SOB. No leg swelling.   -     MICROALBUMIN CREAT RATIO URINE; Future  -     TSH; Future  -     FREE THYROXINE; Future  -     CBC WITHOUT DIFFERENTIAL; Future    Dyslipidemia  Chronic. Not on statin therapy.  Discussed with patient the importance of a heart healthy diet rich in fruits, vegetables, low-fat dairy products, whole grain, poultry, fish, legumes, nuts, and vegetable oil. Limit intake of red meat, sweets, sugar-containing beverages, trans-fats, sodium, and restrict intake of saturated fat to 5-6% of total daily calories.  -     Lipid Profile; Future    Pre-diabetes  Recheck.  Continue to encourage weight loss, exercise, heart healthy diet.  -     HEMOGLOBIN A1C; Future      No follow-ups on file.          Comfort ROSAS"

## 2021-03-29 DIAGNOSIS — G47.00 INSOMNIA, UNSPECIFIED TYPE: ICD-10-CM

## 2021-03-29 DIAGNOSIS — E03.8 OTHER SPECIFIED HYPOTHYROIDISM: ICD-10-CM

## 2021-03-29 RX ORDER — LEVOTHYROXINE SODIUM 0.07 MG/1
75 TABLET ORAL
Qty: 90 TABLET | Refills: 3 | Status: SHIPPED | OUTPATIENT
Start: 2021-03-29 | End: 2022-05-10

## 2021-03-29 RX ORDER — ZOLPIDEM TARTRATE 5 MG/1
5 TABLET ORAL NIGHTLY PRN
Qty: 30 TABLET | Refills: 0 | Status: SHIPPED | OUTPATIENT
Start: 2021-03-29 | End: 2021-06-21

## 2021-03-31 ENCOUNTER — IMMUNIZATION (OUTPATIENT)
Dept: FAMILY PLANNING/WOMEN'S HEALTH CLINIC | Facility: IMMUNIZATION CENTER | Age: 51
End: 2021-03-31
Payer: COMMERCIAL

## 2021-03-31 DIAGNOSIS — Z23 ENCOUNTER FOR VACCINATION: Primary | ICD-10-CM

## 2021-03-31 PROCEDURE — 0001A PFIZER SARS-COV-2 VACCINE: CPT | Performed by: INTERNAL MEDICINE

## 2021-03-31 PROCEDURE — 91300 PFIZER SARS-COV-2 VACCINE: CPT | Performed by: INTERNAL MEDICINE

## 2021-04-21 ENCOUNTER — OFFICE VISIT (OUTPATIENT)
Dept: MEDICAL GROUP | Facility: MEDICAL CENTER | Age: 51
End: 2021-04-21
Payer: COMMERCIAL

## 2021-04-21 VITALS
OXYGEN SATURATION: 97 % | HEART RATE: 83 BPM | WEIGHT: 204.37 LBS | TEMPERATURE: 98.4 F | BODY MASS INDEX: 34.05 KG/M2 | SYSTOLIC BLOOD PRESSURE: 130 MMHG | DIASTOLIC BLOOD PRESSURE: 72 MMHG | HEIGHT: 65 IN

## 2021-04-21 DIAGNOSIS — E03.8 OTHER SPECIFIED HYPOTHYROIDISM: ICD-10-CM

## 2021-04-21 DIAGNOSIS — I10 ESSENTIAL HYPERTENSION: ICD-10-CM

## 2021-04-21 DIAGNOSIS — E55.9 VITAMIN D INSUFFICIENCY: ICD-10-CM

## 2021-04-21 DIAGNOSIS — K59.01 SLOW TRANSIT CONSTIPATION: ICD-10-CM

## 2021-04-21 DIAGNOSIS — E78.5 DYSLIPIDEMIA: ICD-10-CM

## 2021-04-21 DIAGNOSIS — R73.03 PRE-DIABETES: ICD-10-CM

## 2021-04-21 PROCEDURE — 99213 OFFICE O/P EST LOW 20 MIN: CPT | Performed by: NURSE PRACTITIONER

## 2021-04-21 ASSESSMENT — FIBROSIS 4 INDEX: FIB4 SCORE: 0.48

## 2021-04-21 NOTE — PROGRESS NOTES
Chief Complaint   Patient presents with   • Constipation     Fv       Subjective:     HPI:     Alis Gonzalez is a 50 y.o. female here to discuss the evaluation and management of:    Patient does mention that since her last visit the abdominal pain that she had reported has resolved.  She states she did try Metamucil for about 3 days and had stopped as she felt it was not assisting with her constipation.  Have discussed with patient to try for at least 14 days.  She continues to exercise, states her water intake is good and she is trying to eat some fiber foods.  Taken a flaxseed daily and an over-the-counter antacid sometimes.  She states that the last few days she did have a sized bowel movement.  At this time have recommended to continue with the bowel regimen to help with having consistent BMs.  Recommend fiber supplement.  Recommend smooth move tea and been abusable.    Hypertension  Denies any chest pain, shortness of breath or dizziness.  Reports compliance with her medication.    Thyroid  Tolerating current regimen.  Complains of constipation.  Due for recheck    Prediabetes  Due for A1c recheck      ROS:  Denies any Headache, Blurred Vision, Confusion, Chest pain,  Shortness of breath,  Abdominal pain, Changes of bowel or bladder, Lower ext edema, Fevers, Nights sweats, Weight Changes, Focal weakness or numbness.  And all other systems reviewed and are all negative. POSITIVE FOR see above        Current Outpatient Medications:   •  levothyroxine (SYNTHROID) 75 MCG Tab, TAKE 1 TAB BY MOUTH EVERY MORNING ON AN EMPTY STOMACH., Disp: 90 tablet, Rfl: 3  •  zolpidem (AMBIEN) 5 MG Tab, TAKE 1 TAB BY MOUTH AT BEDTIME AS NEEDED FOR SLEEP FOR UP TO 30 DAYS. (Patient taking differently: Take 2.5 mg by mouth at bedtime as needed for Sleep. Indications: Trouble Sleeping), Disp: 30 tablet, Rfl: 0  •  lisinopril (PRINIVIL) 10 MG Tab, Take 1 tablet by mouth every day., Disp: 30 tablet, Rfl: 11  •  omeprazole (PRILOSEC) 20  MG delayed-release capsule, Take 1 capsule by mouth every day., Disp: 14 capsule, Rfl: 0  •  DUPIXENT 300 MG/2ML Solution Prefilled Syringe injection, , Disp: , Rfl:   •  VENTOLIN  (90 Base) MCG/ACT Aero Soln inhalation aerosol, INHALE 1-2 PUFFS BY MOUTH EVERY FOUR HOURS AS NEEDED., Disp: 18 Inhaler, Rfl: 6    Allergies   Allergen Reactions   • Azithromycin Rash     All over body   • Ciprofloxacin Rash     All over body   • Penicillins Rash     Rash         Past Medical History:   Diagnosis Date   • Alopecia 2015   • Asthma    • Daytime sleepiness    • Fatigue    • Insomnia    • Thyroid disease      History reviewed. No pertinent surgical history.  Family History   Problem Relation Age of Onset   • Thyroid Mother         cancer-40's   • Cancer Father         skin-melanoma   • Hyperlipidemia Father    • Cancer Maternal Aunt         breast to brain   • Breast Cancer Maternal Aunt      Social History     Socioeconomic History   • Marital status: Single     Spouse name: Not on file   • Number of children: Not on file   • Years of education: Not on file   • Highest education level: Not on file   Occupational History   • Not on file   Tobacco Use   • Smoking status: Never Smoker   • Smokeless tobacco: Never Used   Substance and Sexual Activity   • Alcohol use: Not Currently     Comment: rarely   • Drug use: No   • Sexual activity: Yes     Partners: Male   Other Topics Concern   • Not on file   Social History Narrative   • Not on file     Social Determinants of Health     Financial Resource Strain:    • Difficulty of Paying Living Expenses:    Food Insecurity:    • Worried About Running Out of Food in the Last Year:    • Ran Out of Food in the Last Year:    Transportation Needs:    • Lack of Transportation (Medical):    • Lack of Transportation (Non-Medical):    Physical Activity:    • Days of Exercise per Week:    • Minutes of Exercise per Session:    Stress:    • Feeling of Stress :    Social Connections:    •  "Frequency of Communication with Friends and Family:    • Frequency of Social Gatherings with Friends and Family:    • Attends [employer] Services:    • Active Member of Clubs or Organizations:    • Attends Club or Organization Meetings:    • Marital Status:    Intimate Partner Violence:    • Fear of Current or Ex-Partner:    • Emotionally Abused:    • Physically Abused:    • Sexually Abused:        Objective:     Vitals: /72 (BP Location: Right arm, Patient Position: Sitting, BP Cuff Size: Adult)   Pulse 83   Temp 36.9 °C (98.4 °F) (Temporal)   Ht 1.651 m (5' 5\")   Wt 92.7 kg (204 lb 5.9 oz)   SpO2 97%   BMI 34.01 kg/m²    General: Alert, pleasant, NAD  HEENT: Normocephalic.    Skin: Warm, dry, no rashes.  Extremities: No leg edema. No discoloration  Neurological: No tremors  Psych:  Affect/mood is normal, judgement is good, memory is intact, grooming is appropriate.    Assessment/Plan:     Alis was seen today for constipation.    Diagnoses and all orders for this visit:    Slow transit constipation  Ongoing problem patient. No pain.  Recommend Fiber supplements, water, activity and Smooth Move Tea. If not improving may refer to GI.     Other specified hypothyroidism  Due for recheck.   -     TSH WITH REFLEX TO FT4; Future    Dyslipidemia   Not on statin therapy. Due for recheck.  -     Lipid Profile; Future    Essential hypertension  Stable on Lisinopril. No CP or SOB. No leg swelling.  Will continue medication regimen. Discussed heart healthy diet. Encouraged to avoid high sodium foods.  -     MICROALBUMIN CREAT RATIO URINE; Future  -     Comp Metabolic Panel; Future    Pre-diabetes  Due for recheck. Continue to encourage weight loss, exercise, heart healthy diet  -     HEMOGLOBIN A1C; Future    Vitamin D insufficiency  -     VITAMIN D,25 HYDROXY; Future          Return if symptoms worsen or fail to improve.          Comfort ROSAS"

## 2021-04-23 ENCOUNTER — IMMUNIZATION (OUTPATIENT)
Dept: FAMILY PLANNING/WOMEN'S HEALTH CLINIC | Facility: IMMUNIZATION CENTER | Age: 51
End: 2021-04-23
Payer: COMMERCIAL

## 2021-04-23 DIAGNOSIS — Z23 ENCOUNTER FOR VACCINATION: Primary | ICD-10-CM

## 2021-04-23 PROCEDURE — 91300 PFIZER SARS-COV-2 VACCINE: CPT | Performed by: INTERNAL MEDICINE

## 2021-04-23 PROCEDURE — 0002A PFIZER SARS-COV-2 VACCINE: CPT | Performed by: INTERNAL MEDICINE

## 2021-05-24 ENCOUNTER — HOSPITAL ENCOUNTER (OUTPATIENT)
Dept: LAB | Facility: MEDICAL CENTER | Age: 51
End: 2021-05-24
Attending: NURSE PRACTITIONER
Payer: COMMERCIAL

## 2021-05-24 DIAGNOSIS — E03.8 OTHER SPECIFIED HYPOTHYROIDISM: ICD-10-CM

## 2021-05-24 DIAGNOSIS — I10 ESSENTIAL HYPERTENSION: ICD-10-CM

## 2021-05-24 DIAGNOSIS — R73.03 PRE-DIABETES: ICD-10-CM

## 2021-05-24 DIAGNOSIS — E55.9 VITAMIN D INSUFFICIENCY: ICD-10-CM

## 2021-05-24 DIAGNOSIS — E78.5 DYSLIPIDEMIA: ICD-10-CM

## 2021-05-24 LAB
ALBUMIN SERPL BCP-MCNC: 4.2 G/DL (ref 3.2–4.9)
ALBUMIN/GLOB SERPL: 1.3 G/DL
ALP SERPL-CCNC: 104 U/L (ref 30–99)
ALT SERPL-CCNC: 50 U/L (ref 2–50)
ANION GAP SERPL CALC-SCNC: 12 MMOL/L (ref 7–16)
AST SERPL-CCNC: 37 U/L (ref 12–45)
BILIRUB SERPL-MCNC: 0.4 MG/DL (ref 0.1–1.5)
BUN SERPL-MCNC: 15 MG/DL (ref 8–22)
CALCIUM SERPL-MCNC: 9.1 MG/DL (ref 8.5–10.5)
CHLORIDE SERPL-SCNC: 105 MMOL/L (ref 96–112)
CHOLEST SERPL-MCNC: 170 MG/DL (ref 100–199)
CO2 SERPL-SCNC: 22 MMOL/L (ref 20–33)
CREAT SERPL-MCNC: 0.52 MG/DL (ref 0.5–1.4)
CREAT UR-MCNC: 159.67 MG/DL
EST. AVERAGE GLUCOSE BLD GHB EST-MCNC: 163 MG/DL
FASTING STATUS PATIENT QL REPORTED: NORMAL
GLOBULIN SER CALC-MCNC: 3.3 G/DL (ref 1.9–3.5)
GLUCOSE SERPL-MCNC: 140 MG/DL (ref 65–99)
HBA1C MFR BLD: 7.3 % (ref 4–5.6)
HDLC SERPL-MCNC: 34 MG/DL
LDLC SERPL CALC-MCNC: 110 MG/DL
MICROALBUMIN UR-MCNC: <1.2 MG/DL
MICROALBUMIN/CREAT UR: NORMAL MG/G (ref 0–30)
POTASSIUM SERPL-SCNC: 4.5 MMOL/L (ref 3.6–5.5)
PROT SERPL-MCNC: 7.5 G/DL (ref 6–8.2)
SODIUM SERPL-SCNC: 139 MMOL/L (ref 135–145)
TRIGL SERPL-MCNC: 131 MG/DL (ref 0–149)
TSH SERPL DL<=0.005 MIU/L-ACNC: 1.23 UIU/ML (ref 0.38–5.33)

## 2021-05-24 PROCEDURE — 36415 COLL VENOUS BLD VENIPUNCTURE: CPT

## 2021-05-24 PROCEDURE — 80053 COMPREHEN METABOLIC PANEL: CPT

## 2021-05-24 PROCEDURE — 82570 ASSAY OF URINE CREATININE: CPT

## 2021-05-24 PROCEDURE — 80061 LIPID PANEL: CPT

## 2021-05-24 PROCEDURE — 82043 UR ALBUMIN QUANTITATIVE: CPT

## 2021-05-24 PROCEDURE — 84443 ASSAY THYROID STIM HORMONE: CPT

## 2021-05-24 PROCEDURE — 83036 HEMOGLOBIN GLYCOSYLATED A1C: CPT

## 2021-05-24 PROCEDURE — 82306 VITAMIN D 25 HYDROXY: CPT

## 2021-05-26 LAB — 25(OH)D3 SERPL-MCNC: 21 NG/ML (ref 30–80)

## 2021-06-10 DIAGNOSIS — E11.9 TYPE 2 DIABETES MELLITUS WITHOUT COMPLICATION, WITHOUT LONG-TERM CURRENT USE OF INSULIN (HCC): ICD-10-CM

## 2021-06-10 RX ORDER — METFORMIN HYDROCHLORIDE 500 MG/1
500 TABLET, EXTENDED RELEASE ORAL 2 TIMES DAILY WITH MEALS
Qty: 60 TABLET | Refills: 3 | Status: SHIPPED | OUTPATIENT
Start: 2021-06-10 | End: 2021-08-10

## 2021-06-21 DIAGNOSIS — G47.00 INSOMNIA, UNSPECIFIED TYPE: ICD-10-CM

## 2021-06-21 RX ORDER — ZOLPIDEM TARTRATE 5 MG/1
TABLET ORAL
Qty: 30 TABLET | Refills: 0 | Status: SHIPPED | OUTPATIENT
Start: 2021-06-21 | End: 2021-08-16

## 2021-07-06 ENCOUNTER — TELEPHONE (OUTPATIENT)
Dept: MEDICAL GROUP | Facility: MEDICAL CENTER | Age: 51
End: 2021-07-06

## 2021-07-06 ENCOUNTER — OFFICE VISIT (OUTPATIENT)
Dept: MEDICAL GROUP | Facility: MEDICAL CENTER | Age: 51
End: 2021-07-06
Payer: COMMERCIAL

## 2021-07-06 VITALS
WEIGHT: 203 LBS | RESPIRATION RATE: 16 BRPM | BODY MASS INDEX: 33.78 KG/M2 | SYSTOLIC BLOOD PRESSURE: 162 MMHG | HEART RATE: 85 BPM | TEMPERATURE: 96.7 F | DIASTOLIC BLOOD PRESSURE: 104 MMHG | OXYGEN SATURATION: 95 %

## 2021-07-06 DIAGNOSIS — I10 ESSENTIAL HYPERTENSION: Chronic | ICD-10-CM

## 2021-07-06 DIAGNOSIS — E11.9 TYPE 2 DIABETES MELLITUS WITHOUT COMPLICATION, WITHOUT LONG-TERM CURRENT USE OF INSULIN (HCC): ICD-10-CM

## 2021-07-06 LAB — GLUCOSE BLD-MCNC: 92 MG/DL (ref 70–100)

## 2021-07-06 PROCEDURE — 99213 OFFICE O/P EST LOW 20 MIN: CPT | Performed by: STUDENT IN AN ORGANIZED HEALTH CARE EDUCATION/TRAINING PROGRAM

## 2021-07-06 PROCEDURE — 82962 GLUCOSE BLOOD TEST: CPT | Performed by: STUDENT IN AN ORGANIZED HEALTH CARE EDUCATION/TRAINING PROGRAM

## 2021-07-06 ASSESSMENT — FIBROSIS 4 INDEX: FIB4 SCORE: 0.6

## 2021-07-07 RX ORDER — LANCETS 30 GAUGE
EACH MISCELLANEOUS
Qty: 100 EACH | Refills: 2 | Status: SHIPPED | OUTPATIENT
Start: 2021-07-07 | End: 2021-08-26 | Stop reason: SDUPTHER

## 2021-07-07 RX ORDER — GLUCOSAMINE HCL/CHONDROITIN SU 500-400 MG
CAPSULE ORAL
Qty: 100 EACH | Refills: 2 | Status: SHIPPED | OUTPATIENT
Start: 2021-07-07

## 2021-07-07 NOTE — TELEPHONE ENCOUNTER
Patients pharmacy is requesting new RX for glucose meter strips and lancets on behalf of the patient.

## 2021-07-07 NOTE — ASSESSMENT & PLAN NOTE
"Patient's recent labs show A1c at 7.3%.  Patient was placed on Metformin 500 mg extended release twice daily with meals.  Patient states that she held off on starting this medication and was working on diet and exercise.  Patient states approximately 5 days ago she started feeling diaphoretic and \" head fog.\"  Patient states she was having trouble recalling simple things like her address and felt she needed to leave work early due to feeling overall unwell.  Patient states that due to this feeling she started taking the Metformin thinking that maybe the diabetes is causing her to have the symptoms.  Patient has been on the Metformin for 5 days and denies GI side effects.  Patient denies any improvement in the lightheadedness or mental fog.  Patient's previous A1c at 7.3% and in office glucose reading today at 93.  Discussed with patient that she should not be having highs or lows that would cause her to feel symptomatic.  Her diabetes is well controlled at this point.  "

## 2021-07-07 NOTE — ASSESSMENT & PLAN NOTE
Patient's blood pressure is elevated in office today at 162/104.  Repeat reading at 170/102.  Patient is diaphoretic in the office today while discussing her symptoms.  Patient denies chest pain.  She denies shortness of breath.  Patient denies changes with exertion.  Patient has not been monitoring blood pressure at home.  Patient states that she does not feel lightheaded.  Patient does state head fall and a slight headache.  Patient did not take her blood pressure medication last night.

## 2021-07-09 ENCOUNTER — TELEPHONE (OUTPATIENT)
Dept: SLEEP MEDICINE | Facility: MEDICAL CENTER | Age: 51
End: 2021-07-09

## 2021-07-09 NOTE — TELEPHONE ENCOUNTER
Patient called in today and stated that she is no longer using her cpap machine and doesn't want to keep paying for it. She called her [employer] company and they told her she needs a doctors note in order for them to take the machine back. Please advise, thank you!

## 2021-07-13 NOTE — TELEPHONE ENCOUNTER
Patient Last Seen on 08/04/20 DIANE Petit APRN  1. PSG split night study from 7/25/20 was reviewed which indicated severe obstructive sleep apnea with AHI of 41.3/hr and O2 abbi 75 %. Due to severity of the disease she met the split study protocol. The titration started with CPAP 4 cm. The AHI improved to 17.56/hr with improved O2 abbi of 79% and average O2 saturation of 90%. Sleep related hypoxia.   Recommendation:  Recommend Auto CPAP 4-10 cm with N30 mask. Consider supplemental O2 bleed in and f/u with OPO on the recommended pressure due to residual sleep hypoxia. In some cases alternative treatment options may prove effective in resolving sleep apnea and these options include upper airway surgery, the use of a dental orthotic or weight loss and positional therapy. Clinical correlation is required. In general patients with sleep apnea are advised to avoid alcohol and sedatives and to not operate a motor vehicle while drowsy and are at a greater risk for cardiovascular disease.  These were reviewed with the patient today.     2.  Patient is amenable to CPAP trial.  DME order ([employer]) for autoCPAP 4-10 cmH2O, mask (medium N30 mask or MOC) and supplies was provided today. Continue to clean mask and supplies weekly, and change supplies per insurance guidelines.   3.  Order OPO on autoCPAP at next office visit to evaluate adequate O2 saturation on therapy.  4.  Encouraged daily activity and healthy diet to help with weight management.  5. Follow up with the appropriate healthcare practitioners for all other medical problems and issues.  6. Sleep hygiene discussed.      Follow-up: Return in about 3 months (around 11/4/2020).      Meme Petit A.P.R.N.        Please advise if provider is willing to sign D/C order . If not pt would need to  Sign DME AMA form.

## 2021-07-13 NOTE — TELEPHONE ENCOUNTER
ELIS Lima.  You 40 minutes ago (11:05 AM)     I would highly encourage the patient to have a follow-up office visit to further review everything.  She never followed up for her first compliance visit.  Inquire if the patient also has supplemental oxygen at night.  The patient's CPAP should be paid off soon and I would encourage her to keep it because if she turns the machine in she would need to repeat a sleep study should she ever want to pursue therapy again.  I also discouraged the patient from discontinuing CPAP therapy as she does have severe sleep apnea and likely also will require supplemental oxygen at night.  We can further review this at her follow-up office visit.     Thank you   ELIS Lima.          [facility] informing patient of Above response from Provider. Scheduled patient for 07/20/21 with DIANE HOLLIDAY . Informed to CB if this appointment does not work for pt but we would like to see pt before she turns in pap .     Closing encounter for now

## 2021-07-15 ENCOUNTER — TELEPHONE (OUTPATIENT)
Dept: MEDICAL GROUP | Facility: MEDICAL CENTER | Age: 51
End: 2021-07-15

## 2021-07-15 DIAGNOSIS — L65.9 ALOPECIA: ICD-10-CM

## 2021-07-15 DIAGNOSIS — L23.9 ALLERGIC ECZEMA: ICD-10-CM

## 2021-07-15 DIAGNOSIS — D72.10 EOSINOPHILIA, UNSPECIFIED TYPE: ICD-10-CM

## 2021-07-15 NOTE — TELEPHONE ENCOUNTER
1. Caller Name: Alis Concepcion Held                        Call Back Number: [phone] (home)       How would the patient prefer to be contacted with a response: Phone call do NOT leave a detailed message    2. SPECIFIC Action To Be Taken: Referral pending, please sign. Renewal    3. Diagnosis/Clinical Reason for Request: Eosinophilia, Allergic eczema, Alopecia.    4. Specialty & Provider Name/Lab/Imaging Location: Allergy & Asthma Specialist    5. Is appointment scheduled for requested order/referral: yes - 7/15/2021    Patient was not informed they will receive a return phone call from the office ONLY if there are any questions before processing their request. Advised to call back if they haven't received a call from the referral department in 5 days.

## 2021-07-19 DIAGNOSIS — G47.33 SEVERE OBSTRUCTIVE SLEEP APNEA: ICD-10-CM

## 2021-07-20 ENCOUNTER — SLEEP CENTER VISIT (OUTPATIENT)
Dept: SLEEP MEDICINE | Facility: MEDICAL CENTER | Age: 51
End: 2021-07-20
Payer: COMMERCIAL

## 2021-07-20 VITALS
BODY MASS INDEX: 33.32 KG/M2 | HEIGHT: 65 IN | WEIGHT: 200 LBS | HEART RATE: 83 BPM | OXYGEN SATURATION: 96 % | SYSTOLIC BLOOD PRESSURE: 126 MMHG | DIASTOLIC BLOOD PRESSURE: 78 MMHG

## 2021-07-20 DIAGNOSIS — G47.33 OSA (OBSTRUCTIVE SLEEP APNEA): ICD-10-CM

## 2021-07-20 DIAGNOSIS — I10 ESSENTIAL HYPERTENSION: ICD-10-CM

## 2021-07-20 DIAGNOSIS — G47.00 INSOMNIA, UNSPECIFIED TYPE: ICD-10-CM

## 2021-07-20 PROCEDURE — 99214 OFFICE O/P EST MOD 30 MIN: CPT | Performed by: NURSE PRACTITIONER

## 2021-07-20 ASSESSMENT — FIBROSIS 4 INDEX: FIB4 SCORE: 0.6

## 2021-07-20 NOTE — PROGRESS NOTES
"Chief Complaint   Patient presents with   • Follow-Up     CRISELDA-Last seen 08/04/2020       HPI:      Mrs. Gonzalez is a 49 y/o female patient who is in today for annual CRISELDA f/u. PMH includes alopecia, hypothyroidism, vitamin D insufficiency, asthma, prediabetes, dyslipidemia, hypertension, obesity, fatty liver, secondary thrombocytosis, insomnia.    She has struggled with consistent sleep for many years however in 1/2020 she walked into her bedroom and found her  dead from a myocardial infarction.  Since that day she has \"relived\" that moment every time she goes to sleep.   She feels that she does have PTSD from this experience but at this time defers a referral to psychology or psychiatry.  Compliance report from 8/5/21-10/28/21 was downloaded and reviewed with the patient which showed autoCPAP 4-20 cmH2O, 11% compliance, 2 hrs 46 min use (5% compliance), AHI of 0.4. She is tolerating the pressure and mask well. She goes to bed between 10 and 10:15 PM and wakes up around 6:30 AM.  She continues to wake up frequently through the night and sometimes does have trouble falling back asleep.  She denies morning headache or snoring.  She continues to utilize Ambien 5 mg half a tablet every other day.  She has seen Dr. Eldridge in the past for CBT-I but did not find this very beneficial.  She is open to other alternative treatments to help with apnea management.  She continues to follow-up with PCP for blood pressure management.    Sleep Study History:   PSG split night study from 7/25/20 was reviewed which indicated severe obstructive sleep apnea with AHI of 41.3/hr and O2 abbi 75 %. Due to severity of the disease she met the split study protocol. The titration started with CPAP 4 cm. The AHI improved to 17.56/hr with improved O2 abbi of 79% and average O2 saturation of 90%. Sleep related hypoxia.     Echo from 4/15/20 indicated Left ventricular ejection fraction is visually estimated to be greater than 75%.  Mild " concentric left ventricular hypertrophy.    ROS:    Constitutional: Denies fevers, Denies weight changes  Eyes: Denies changes in vision, no eye pain  Ears/Nose/Throat/Mouth: Denies nasal congestion or sore throat   Cardiovascular: Denies chest pain or palpitations   Respiratory: Denies shortness of breath , Denies cough  Gastrointestinal/Hepatic: Denies abdominal pain, nausea, vomiting,   Skin/Breast: Denies rash,   Neurological: Denies headache, confusion,   Psychiatric: denies mood disorder   Sleep: denies snoring       Past Medical History:   Diagnosis Date   • Alopecia 2015   • Asthma    • Daytime sleepiness    • Fatigue    • Insomnia    • Thyroid disease        History reviewed. No pertinent surgical history.    Family History   Problem Relation Age of Onset   • Thyroid Mother         cancer-40's   • Cancer Father         skin-melanoma   • Hyperlipidemia Father    • Cancer Maternal Aunt         breast to brain   • Breast Cancer Maternal Aunt        Social History     Socioeconomic History   • Marital status: Single     Spouse name: Not on file   • Number of children: Not on file   • Years of education: Not on file   • Highest education level: Not on file   Occupational History   • Not on file   Tobacco Use   • Smoking status: Never Smoker   • Smokeless tobacco: Never Used   Vaping Use   • Vaping Use: Never used   Substance and Sexual Activity   • Alcohol use: Not Currently     Comment: rarely   • Drug use: No   • Sexual activity: Yes     Partners: Male   Other Topics Concern   • Not on file   Social History Narrative   • Not on file     Social Determinants of Health     Financial Resource Strain:    • Difficulty of Paying Living Expenses:    Food Insecurity:    • Worried About Running Out of Food in the Last Year:    • Ran Out of Food in the Last Year:    Transportation Needs:    • Lack of Transportation (Medical):    • Lack of Transportation (Non-Medical):    Physical Activity:    • Days of Exercise per Week:     • Minutes of Exercise per Session:    Stress:    • Feeling of Stress :    Social Connections:    • Frequency of Communication with Friends and Family:    • Frequency of Social Gatherings with Friends and Family:    • Attends Faith Services:    • Active Member of Clubs or Organizations:    • Attends Club or Organization Meetings:    • Marital Status:    Intimate Partner Violence:    • Fear of Current or Ex-Partner:    • Emotionally Abused:    • Physically Abused:    • Sexually Abused:        Allergies as of 07/20/2021 - Reviewed 07/20/2021   Allergen Reaction Noted   • Azithromycin Rash 01/08/2018   • Ciprofloxacin Rash 01/08/2018   • Penicillins Rash 01/08/2018        Vitals:  Vitals:    07/20/21 0850   BP: 126/78   Pulse: 83   SpO2: 96%       Current medications as of today   Current Outpatient Medications   Medication Sig Dispense Refill   • Blood Glucose Meter Kit Test blood sugar as recommended by provider.  Pharmacy/insurance preference blood glucose monitoring kit. 1 Kit 0   • Blood Glucose Test Strips Use one  strip to test blood sugar once daily . 100 Strip 2   • lisinopril (PRINIVIL) 10 MG Tab Take 1 tablet by mouth every day. 30 tablet 11   • zolpidem (AMBIEN) 5 MG Tab TAKE 1 TABLET BY MOUTH AT BEDTIME AS NEEDED FOR SLEEP.G47/30D 30 tablet 0   • metFORMIN ER (GLUCOPHAGE XR) 500 MG TABLET SR 24 HR Take 1 tablet by mouth 2 times a day with meals. 60 tablet 3   • levothyroxine (SYNTHROID) 75 MCG Tab TAKE 1 TAB BY MOUTH EVERY MORNING ON AN EMPTY STOMACH. 90 tablet 3   • DUPIXENT 300 MG/2ML Solution Prefilled Syringe injection      • VENTOLIN  (90 Base) MCG/ACT Aero Soln inhalation aerosol INHALE 1-2 PUFFS BY MOUTH EVERY FOUR HOURS AS NEEDED. 18 Inhaler 6   • Lancets Use one  lancet to test blood sugar once daily . 100 Each 2   • Alcohol Swabs Wipe site with prep pad prior to injection. 100 Each 2   • omeprazole (PRILOSEC) 20 MG delayed-release capsule Take 1 capsule by mouth every day. (Patient  not taking: Reported on 7/6/2021) 14 capsule 0     No current facility-administered medications for this visit.         Physical Exam: Limited by COVID-19 precautions.  Appearance: Well developed, well nourished, no acute distress  Eyes: PERRL, EOM intact, sclera white, conjunctiva moist  Ears: no lesions or deformities  Hearing: grossly intact  Nose: no lesions or deformities  Respiratory effort: no intercostal retractions or use of accessory muscles  Extremities: no cyanosis or edema  Abdomen: soft   Gait and Station: normal  Digits and nails: no clubbing, cyanosis, petechiae or nodes.  Cranial nerves: grossly intact  Skin: no visible rashes, lesions or ulcers noted  Orientation: Oriented to time, person and place  Mood and affect: mood and affect appropriate, normal interaction with examiner  Judgement: Intact    Assessment:  1. CRISELDA (obstructive sleep apnea)  REFERRAL TO ENT   2. Essential hypertension     3. BMI 33.0-33.9,adult     4. Insomnia, unspecified type           Plan  Discussed the cardiovascular and neuropsychiatric risks of untreated CRISELDA; including but not limited to: HTN, DM, MI, ASCVD, CVA, CHF, traffic accidents.     1. Compliance report from 8/5/21-10/28/21 was downloaded and reviewed with the patient which showed autoCPAP 4-20 cmH2O, 11% compliance, 2 hrs 46 min use (5% compliance), AHI of 0.4.  Patient is not compliant with autoCPAP therapy for management of CRISELDA.  Patient has been utilizing pulmonary solutions.    *Reviewed inspire implant therapy with the patient and additional educational information was provided.  Referral to Dr. High for inspire implant therapy was placed today.    2. Continue to f/u with PCP for BP management.  3. Continue to stay active.   4.  Sleep hygiene discussed. Recommend keeping a set sleep/wake schedule. Logging enough hours of sleep. Limiting/Avoiding naps. No caffeine after noon and no heavy meals in the evening.   Chronic insomnia: Using Ambien 5 mg half a  tablet every other day nightly as needed for insomnia.  Patient has seen Dr. Eldridge for CBT-I in the past.  May consider referral to psychiatry for PTSD in the future if needed.  5. Follow up with the appropriate healthcare practitioners for all other medical problems.  6. F/u in 4 months for CRISELDA and inspire implant activation at the end of the day.       ELIS Lima.      This dictation was created using voice recognition software. The accuracy of the dictation is limited to the abilities of the software. I expect there may be some errors of grammar and possibly content.

## 2021-07-20 NOTE — PATIENT INSTRUCTIONS
Refer to Dr. High, ENT.    Inspire is the only FDA approved, in 2014, obstructive sleep apnea treatment that works inside your body to treat the root cause of sleep apnea with just a click of a button.  It works inside your body while you sleep.  It is a small device that is placed under the skin of the neck and chest during a short outpatient procedure  After healing it is turned on.  When you are ready for bed you simply click the remote to turn the Inspire on.  While you sleep Inspire opens your airway allowing you to breathe normally and sleep peacefully. It works by moving the tongue to keep the air way open while you are sleeping. Most [place] insurance covers inspire.    To be a candidate you have to have moderate to severe obstructive sleep apnea with an AHI between 15 and 65, are unable to use or get consistent benefit from CPAP, are not significantly obese a BMI less than 35, the sleep study is less than 2 years old, and over the age of 22. It does not work with central sleep apnea.     Prior to the Inspire implant procedure the patient will undergo a drug-induced endoscopy which will determine if airway collapse is on 4 or 2 sides of the airway.  If the airway collapses on 4 sides the patient is not a candidate for the implant; however if the airway collapses in the front and the back only the patient is a candidate for Inspire implant.   Patients with anterior-posterior predominant retropalatal collapse on drug-induced sleep endoscopy are candidates for the device.  The patient's who have complete concentric collapse at the retropalatal airway are excluded as protrusion of the time will not resolve this pattern of airway obstruction.    Inspire is safe, clinically proven, and FDA approved.  90% of that bed partners report no snoring or soft snoring, there is a 79% reduction in sleep apnea events, 94% of people are satisfied with inspire, 96% of the Inspire patients say Inspire is better than CPAP and would  recommend Inspire to others.    You can learn more at [employer].          Sleep Apnea  Sleep apnea affects breathing during sleep. It causes breathing to stop for a short time or to become shallow. It can also increase the risk of:  · Heart attack.  · Stroke.  · Being very overweight (obese).  · Diabetes.  · Heart failure.  · Irregular heartbeat.  The goal of treatment is to help you breathe normally again.  What are the causes?  There are three kinds of sleep apnea:  · Obstructive sleep apnea. This is caused by a blocked or collapsed airway.  · Central sleep apnea. This happens when the brain does not send the right signals to the muscles that control breathing.  · Mixed sleep apnea. This is a combination of obstructive and central sleep apnea.  The most common cause of this condition is a collapsed or blocked airway. This can happen if:  · Your throat muscles are too relaxed.  · Your tongue and tonsils are too large.  · You are overweight.  · Your airway is too small.  What increases the risk?  · Being overweight.  · Smoking.  · Having a small airway.  · Being older.  · Being male.  · Drinking alcohol.  · Taking medicines to calm yourself (sedatives or tranquilizers).  · Having family members with the condition.  What are the signs or symptoms?  · Trouble staying asleep.  · Being sleepy or tired during the day.  · Getting angry a lot.  · Loud snoring.  · Headaches in the morning.  · Not being able to focus your mind (concentrate).  · Forgetting things.  · Less interest in sex.  · Mood swings.  · Personality changes.  · Feelings of sadness (depression).  · Waking up a lot during the night to pee (urinate).  · Dry mouth.  · Sore throat.  How is this diagnosed?  · Your medical history.  · A physical exam.  · A test that is done when you are sleeping (sleep study). The test is most often done in a sleep lab but may also be done at home.  How is this treated?    · Sleeping on your side.  · Using a medicine to  get rid of mucus in your nose (decongestant).  · Avoiding the use of alcohol, medicines to help you relax, or certain pain medicines (narcotics).  · Losing weight, if needed.  · Changing your diet.  · Not smoking.  · Using a machine to open your airway while you sleep, such as:  ? An oral appliance. This is a mouthpiece that shifts your lower jaw forward.  ? A CPAP device. This device blows air through a mask when you breathe out (exhale).  ? An EPAP device. This has valves that you put in each nostril.  ? A BPAP device. This device blows air through a mask when you breathe in (inhale) and breathe out.  · Having surgery if other treatments do not work.  It is important to get treatment for sleep apnea. Without treatment, it can lead to:  · High blood pressure.  · Coronary artery disease.  · In men, not being able to have an erection (impotence).  · Reduced thinking ability.  Follow these instructions at home:  Lifestyle  · Make changes that your doctor recommends.  · Eat a healthy diet.  · Lose weight if needed.  · Avoid alcohol, medicines to help you relax, and some pain medicines.  · Do not use any products that contain nicotine or tobacco, such as cigarettes, e-cigarettes, and chewing tobacco. If you need help quitting, ask your doctor.  General instructions  · Take over-the-counter and prescription medicines only as told by your doctor.  · If you were given a machine to use while you sleep, use it only as told by your doctor.  · If you are having surgery, make sure to tell your doctor you have sleep apnea. You may need to bring your device with you.  · Keep all follow-up visits as told by your doctor. This is important.  Contact a doctor if:  · The machine that you were given to use during sleep bothers you or does not seem to be working.  · You do not get better.  · You get worse.  Get help right away if:  · Your chest hurts.  · You have trouble breathing in enough air.  · You have an uncomfortable feeling in  your back, arms, or stomach.  · You have trouble talking.  · One side of your body feels weak.  · A part of your face is hanging down.  These symptoms may be an emergency. Do not wait to see if the symptoms will go away. Get medical help right away. Call your local emergency services (911 in the [place] Do not drive yourself to the hospital.  Summary  · This condition affects breathing during sleep.  · The most common cause is a collapsed or blocked airway.  · The goal of treatment is to help you breathe normally while you sleep.  This information is not intended to replace advice given to you by your health care provider. Make sure you discuss any questions you have with your health care provider.  Document Released: 09/26/2009 Document Revised: 10/04/2019 Document Reviewed: 08/13/2019  [employer] Patient Education © 2020 [employer]          Insomnia  Insomnia is a sleep disorder that makes it difficult to fall asleep or stay asleep. Insomnia can cause fatigue, low energy, difficulty concentrating, mood swings, and poor performance at work or school.  There are three different ways to classify insomnia:  · Difficulty falling asleep.  · Difficulty staying asleep.  · Waking up too early in the morning.  Any type of insomnia can be long-term (chronic) or short-term (acute). Both are common. Short-term insomnia usually lasts for three months or less. Chronic insomnia occurs at least three times a week for longer than three months.  What are the causes?  Insomnia may be caused by another condition, situation, or substance, such as:  · Anxiety.  · Certain medicines.  · Gastroesophageal reflux disease (GERD) or other gastrointestinal conditions.  · Asthma or other breathing conditions.  · Restless legs syndrome, sleep apnea, or other sleep disorders.  · Chronic pain.  · Menopause.  · Stroke.  · Abuse of alcohol, tobacco, or illegal drugs.  · Mental health conditions, such as depression.  · Caffeine.  · Neurological  disorders, such as Alzheimer's disease.  · An overactive thyroid (hyperthyroidism).  Sometimes, the cause of insomnia may not be known.  What increases the risk?  Risk factors for insomnia include:  · Gender. Women are affected more often than men.  · Age. Insomnia is more common as you get older.  · Stress.  · Lack of exercise.  · Irregular work schedule or working night shifts.  · Traveling between different time zones.  · Certain medical and mental health conditions.  What are the signs or symptoms?  If you have insomnia, the main symptom is having trouble falling asleep or having trouble staying asleep. This may lead to other symptoms, such as:  · Feeling fatigued or having low energy.  · Feeling nervous about going to sleep.  · Not feeling rested in the morning.  · Having trouble concentrating.  · Feeling irritable, anxious, or depressed.  How is this diagnosed?  This condition may be diagnosed based on:  · Your symptoms and medical history. Your health care provider may ask about:  ? Your sleep habits.  ? Any medical conditions you have.  ? Your mental health.  · A physical exam.  How is this treated?  Treatment for insomnia depends on the cause. Treatment may focus on treating an underlying condition that is causing insomnia. Treatment may also include:  · Medicines to help you sleep.  · Counseling or therapy.  · Lifestyle adjustments to help you sleep better.  Follow these instructions at home:  Eating and drinking    · Limit or avoid alcohol, caffeinated beverages, and cigarettes, especially close to bedtime. These can disrupt your sleep.  · Do not eat a large meal or eat spicy foods right before bedtime. This can lead to digestive discomfort that can make it hard for you to sleep.  Sleep habits    · Keep a sleep diary to help you and your health care provider figure out what could be causing your insomnia. Write down:  ? When you sleep.  ? When you wake up during the night.  ? How well you sleep.  ? How  rested you feel the next day.  ? Any side effects of medicines you are taking.  ? What you eat and drink.  · Make your bedroom a dark, comfortable place where it is easy to fall asleep.  ? Put up shades or blackout curtains to block light from outside.  ? Use a white noise machine to block noise.  ? Keep the temperature cool.  · Limit screen use before bedtime. This includes:  ? Watching TV.  ? Using your smartphone, tablet, or computer.  · Stick to a routine that includes going to bed and waking up at the same times every day and night. This can help you fall asleep faster. Consider making a quiet activity, such as reading, part of your nighttime routine.  · Try to avoid taking naps during the day so that you sleep better at night.  · Get out of bed if you are still awake after 15 minutes of trying to sleep. Keep the lights down, but try reading or doing a quiet activity. When you feel sleepy, go back to bed.  General instructions  · Take over-the-counter and prescription medicines only as told by your health care provider.  · Exercise regularly, as told by your health care provider. Avoid exercise starting several hours before bedtime.  · Use relaxation techniques to manage stress. Ask your health care provider to suggest some techniques that may work well for you. These may include:  ? Breathing exercises.  ? Routines to release muscle tension.  ? Visualizing peaceful scenes.  · Make sure that you drive carefully. Avoid driving if you feel very sleepy.  · Keep all follow-up visits as told by your health care provider. This is important.  Contact a health care provider if:  · You are tired throughout the day.  · You have trouble in your daily routine due to sleepiness.  · You continue to have sleep problems, or your sleep problems get worse.  Get help right away if:  · You have serious thoughts about hurting yourself or someone else.  If you ever feel like you may hurt yourself or others, or have thoughts about  taking your own life, get help right away. You can go to your nearest emergency department or call:  · Your local emergency services (911 in the [place]  · A suicide crisis helpline, such as the National Suicide Prevention Lifeline at 1-704.636.9206. This is open 24 hours a day.  Summary  · Insomnia is a sleep disorder that makes it difficult to fall asleep or stay asleep.  · Insomnia can be long-term (chronic) or short-term (acute).  · Treatment for insomnia depends on the cause. Treatment may focus on treating an underlying condition that is causing insomnia.  · Keep a sleep diary to help you and your health care provider figure out what could be causing your insomnia.  This information is not intended to replace advice given to you by your health care provider. Make sure you discuss any questions you have with your health care provider.  Document Released: 12/15/2001 Document Revised: 11/30/2018 Document Reviewed: 09/27/2018  Elsevier Patient Education © 2020 [employer]

## 2021-08-14 DIAGNOSIS — G47.00 INSOMNIA, UNSPECIFIED TYPE: ICD-10-CM

## 2021-08-16 RX ORDER — ZOLPIDEM TARTRATE 5 MG/1
TABLET ORAL
Qty: 30 TABLET | Refills: 0 | Status: SHIPPED | OUTPATIENT
Start: 2021-08-16 | End: 2021-10-14

## 2021-08-26 RX ORDER — LANCETS 30 GAUGE
EACH MISCELLANEOUS
Qty: 100 EACH | Refills: 2 | Status: SHIPPED | OUTPATIENT
Start: 2021-08-26 | End: 2023-06-06

## 2021-08-27 RX ORDER — ALBUTEROL SULFATE 90 UG/1
1-2 AEROSOL, METERED RESPIRATORY (INHALATION) EVERY 4 HOURS PRN
Qty: 18 G | Refills: 6 | Status: SHIPPED | OUTPATIENT
Start: 2021-08-27 | End: 2023-06-06

## 2021-08-27 RX ORDER — LISINOPRIL 10 MG/1
10 TABLET ORAL DAILY
Qty: 30 TABLET | Refills: 11 | Status: SHIPPED | OUTPATIENT
Start: 2021-08-27 | End: 2021-09-28

## 2021-09-28 ENCOUNTER — TELEMEDICINE (OUTPATIENT)
Dept: MEDICAL GROUP | Facility: MEDICAL CENTER | Age: 51
End: 2021-09-28
Payer: COMMERCIAL

## 2021-09-28 DIAGNOSIS — R35.0 URINARY FREQUENCY: ICD-10-CM

## 2021-09-28 PROCEDURE — 99213 OFFICE O/P EST LOW 20 MIN: CPT | Mod: 95 | Performed by: STUDENT IN AN ORGANIZED HEALTH CARE EDUCATION/TRAINING PROGRAM

## 2021-09-28 RX ORDER — SULFAMETHOXAZOLE AND TRIMETHOPRIM 800; 160 MG/1; MG/1
1 TABLET ORAL EVERY 12 HOURS
Qty: 6 TABLET | Refills: 0 | Status: SHIPPED | OUTPATIENT
Start: 2021-09-28 | End: 2021-10-01

## 2021-09-28 RX ORDER — BLOOD-GLUCOSE METER
EACH MISCELLANEOUS
COMMUNITY
Start: 2021-07-07 | End: 2023-06-06

## 2021-09-28 RX ORDER — LISINOPRIL 20 MG/1
20 TABLET ORAL DAILY
Qty: 90 TABLET | Refills: 2 | Status: SHIPPED | OUTPATIENT
Start: 2021-09-28 | End: 2022-04-04

## 2021-09-28 RX ORDER — BLOOD SUGAR DIAGNOSTIC
STRIP MISCELLANEOUS
COMMUNITY
Start: 2021-07-07 | End: 2022-06-27

## 2021-09-29 RX ORDER — LISINOPRIL 10 MG/1
10 TABLET ORAL 2 TIMES DAILY
Qty: 90 TABLET | Refills: 1 | OUTPATIENT
Start: 2021-09-29

## 2021-10-13 DIAGNOSIS — G47.00 INSOMNIA, UNSPECIFIED TYPE: ICD-10-CM

## 2021-10-14 RX ORDER — ZOLPIDEM TARTRATE 5 MG/1
TABLET ORAL
Qty: 30 TABLET | Refills: 0 | Status: SHIPPED | OUTPATIENT
Start: 2021-10-14 | End: 2021-12-14

## 2021-11-23 ENCOUNTER — APPOINTMENT (OUTPATIENT)
Dept: SLEEP MEDICINE | Facility: MEDICAL CENTER | Age: 51
End: 2021-11-23
Payer: COMMERCIAL

## 2021-11-24 ENCOUNTER — HOSPITAL ENCOUNTER (OUTPATIENT)
Dept: LAB | Facility: MEDICAL CENTER | Age: 51
End: 2021-11-24
Attending: NURSE PRACTITIONER
Payer: COMMERCIAL

## 2021-11-24 DIAGNOSIS — E78.5 DYSLIPIDEMIA: ICD-10-CM

## 2021-11-24 DIAGNOSIS — R73.03 PRE-DIABETES: ICD-10-CM

## 2021-11-24 DIAGNOSIS — I10 ESSENTIAL HYPERTENSION: ICD-10-CM

## 2021-11-24 LAB
ALBUMIN SERPL BCP-MCNC: 4.6 G/DL (ref 3.2–4.9)
ALBUMIN/GLOB SERPL: 1.5 G/DL
ALP SERPL-CCNC: 93 U/L (ref 30–99)
ALT SERPL-CCNC: 28 U/L (ref 2–50)
ANION GAP SERPL CALC-SCNC: 13 MMOL/L (ref 7–16)
AST SERPL-CCNC: 22 U/L (ref 12–45)
BASOPHILS # BLD AUTO: 0.8 % (ref 0–1.8)
BASOPHILS # BLD: 0.07 K/UL (ref 0–0.12)
BILIRUB CONJ SERPL-MCNC: <0.2 MG/DL (ref 0.1–0.5)
BILIRUB INDIRECT SERPL-MCNC: NORMAL MG/DL (ref 0–1)
BILIRUB SERPL-MCNC: 0.4 MG/DL (ref 0.1–1.5)
BUN SERPL-MCNC: 13 MG/DL (ref 8–22)
CALCIUM SERPL-MCNC: 9.6 MG/DL (ref 8.5–10.5)
CHLORIDE SERPL-SCNC: 104 MMOL/L (ref 96–112)
CHOLEST SERPL-MCNC: 201 MG/DL (ref 100–199)
CO2 SERPL-SCNC: 23 MMOL/L (ref 20–33)
CREAT SERPL-MCNC: 0.56 MG/DL (ref 0.5–1.4)
EOSINOPHIL # BLD AUTO: 0.41 K/UL (ref 0–0.51)
EOSINOPHIL NFR BLD: 4.6 % (ref 0–6.9)
ERYTHROCYTE [DISTWIDTH] IN BLOOD BY AUTOMATED COUNT: 42.8 FL (ref 35.9–50)
EST. AVERAGE GLUCOSE BLD GHB EST-MCNC: 146 MG/DL
FASTING STATUS PATIENT QL REPORTED: NORMAL
FASTING STATUS PATIENT QL REPORTED: NORMAL
GLOBULIN SER CALC-MCNC: 3.1 G/DL (ref 1.9–3.5)
GLUCOSE SERPL-MCNC: 123 MG/DL (ref 65–99)
HBA1C MFR BLD: 6.7 % (ref 4–5.6)
HCT VFR BLD AUTO: 46.3 % (ref 37–47)
HDLC SERPL-MCNC: 36 MG/DL
HGB BLD-MCNC: 15.1 G/DL (ref 12–16)
IMM GRANULOCYTES # BLD AUTO: 0.02 K/UL (ref 0–0.11)
IMM GRANULOCYTES NFR BLD AUTO: 0.2 % (ref 0–0.9)
LDLC SERPL CALC-MCNC: 146 MG/DL
LYMPHOCYTES # BLD AUTO: 3.08 K/UL (ref 1–4.8)
LYMPHOCYTES NFR BLD: 34.9 % (ref 22–41)
MCH RBC QN AUTO: 30.1 PG (ref 27–33)
MCHC RBC AUTO-ENTMCNC: 32.6 G/DL (ref 33.6–35)
MCV RBC AUTO: 92.2 FL (ref 81.4–97.8)
MONOCYTES # BLD AUTO: 0.52 K/UL (ref 0–0.85)
MONOCYTES NFR BLD AUTO: 5.9 % (ref 0–13.4)
NEUTROPHILS # BLD AUTO: 4.73 K/UL (ref 2–7.15)
NEUTROPHILS NFR BLD: 53.6 % (ref 44–72)
NRBC # BLD AUTO: 0 K/UL
NRBC BLD-RTO: 0 /100 WBC
PLATELET # BLD AUTO: 517 K/UL (ref 164–446)
PMV BLD AUTO: 9.3 FL (ref 9–12.9)
POTASSIUM SERPL-SCNC: 4.4 MMOL/L (ref 3.6–5.5)
PROT SERPL-MCNC: 7.7 G/DL (ref 6–8.2)
RBC # BLD AUTO: 5.02 M/UL (ref 4.2–5.4)
SODIUM SERPL-SCNC: 140 MMOL/L (ref 135–145)
T4 FREE SERPL-MCNC: 1.05 NG/DL (ref 0.93–1.7)
TRIGL SERPL-MCNC: 97 MG/DL (ref 0–149)
TSH SERPL DL<=0.005 MIU/L-ACNC: 1.2 UIU/ML (ref 0.38–5.33)
WBC # BLD AUTO: 8.8 K/UL (ref 4.8–10.8)

## 2021-11-24 PROCEDURE — 84439 ASSAY OF FREE THYROXINE: CPT

## 2021-11-24 PROCEDURE — 84443 ASSAY THYROID STIM HORMONE: CPT

## 2021-11-24 PROCEDURE — 82785 ASSAY OF IGE: CPT

## 2021-11-24 PROCEDURE — 83036 HEMOGLOBIN GLYCOSYLATED A1C: CPT

## 2021-11-24 PROCEDURE — 86003 ALLG SPEC IGE CRUDE XTRC EA: CPT

## 2021-11-24 PROCEDURE — 82043 UR ALBUMIN QUANTITATIVE: CPT

## 2021-11-24 PROCEDURE — 82248 BILIRUBIN DIRECT: CPT

## 2021-11-24 PROCEDURE — 36415 COLL VENOUS BLD VENIPUNCTURE: CPT

## 2021-11-24 PROCEDURE — 82570 ASSAY OF URINE CREATININE: CPT

## 2021-11-24 PROCEDURE — 80053 COMPREHEN METABOLIC PANEL: CPT

## 2021-11-24 PROCEDURE — 85025 COMPLETE CBC W/AUTO DIFF WBC: CPT

## 2021-11-24 PROCEDURE — 80061 LIPID PANEL: CPT

## 2021-11-25 LAB
CREAT UR-MCNC: 114.16 MG/DL
MICROALBUMIN UR-MCNC: <1.2 MG/DL
MICROALBUMIN/CREAT UR: NORMAL MG/G (ref 0–30)

## 2021-12-01 LAB
DEPRECATED MISC ALLERGEN IGE RAST QL: NORMAL
IGE SERPL-ACNC: 662 KU/L
PENICILLIN G IGE QN: <0.1 KU/L
PENICILLIN V IGE QN: <0.1 KU/L

## 2021-12-09 ENCOUNTER — HOSPITAL ENCOUNTER (OUTPATIENT)
Dept: RADIOLOGY | Facility: MEDICAL CENTER | Age: 51
End: 2021-12-09
Attending: NURSE PRACTITIONER
Payer: COMMERCIAL

## 2021-12-09 DIAGNOSIS — Z12.31 VISIT FOR SCREENING MAMMOGRAM: ICD-10-CM

## 2021-12-09 PROCEDURE — 77063 BREAST TOMOSYNTHESIS BI: CPT

## 2021-12-14 DIAGNOSIS — G47.00 INSOMNIA, UNSPECIFIED TYPE: ICD-10-CM

## 2021-12-14 RX ORDER — ZOLPIDEM TARTRATE 5 MG/1
TABLET ORAL
Qty: 30 TABLET | Refills: 0 | Status: SHIPPED | OUTPATIENT
Start: 2021-12-14 | End: 2022-02-09

## 2021-12-15 PROBLEM — G47.00 INSOMNIA: Chronic | Status: ACTIVE | Noted: 2018-01-08

## 2021-12-15 PROBLEM — G47.33 SEVERE OBSTRUCTIVE SLEEP APNEA: Chronic | Status: ACTIVE | Noted: 2020-10-23

## 2022-02-08 DIAGNOSIS — G47.00 INSOMNIA, UNSPECIFIED TYPE: ICD-10-CM

## 2022-02-09 RX ORDER — ZOLPIDEM TARTRATE 5 MG/1
TABLET ORAL
Qty: 30 TABLET | Refills: 0 | Status: SHIPPED | OUTPATIENT
Start: 2022-02-09 | End: 2022-03-02 | Stop reason: SDUPTHER

## 2022-03-02 ENCOUNTER — OFFICE VISIT (OUTPATIENT)
Dept: MEDICAL GROUP | Facility: MEDICAL CENTER | Age: 52
End: 2022-03-02
Payer: COMMERCIAL

## 2022-03-02 VITALS
HEART RATE: 82 BPM | OXYGEN SATURATION: 95 % | SYSTOLIC BLOOD PRESSURE: 136 MMHG | HEIGHT: 65 IN | WEIGHT: 197.31 LBS | DIASTOLIC BLOOD PRESSURE: 76 MMHG | TEMPERATURE: 97.5 F | BODY MASS INDEX: 32.87 KG/M2

## 2022-03-02 DIAGNOSIS — G47.00 INSOMNIA, UNSPECIFIED TYPE: ICD-10-CM

## 2022-03-02 DIAGNOSIS — E11.9 TYPE 2 DIABETES MELLITUS WITHOUT COMPLICATION, WITHOUT LONG-TERM CURRENT USE OF INSULIN (HCC): ICD-10-CM

## 2022-03-02 DIAGNOSIS — N95.0 POSTMENOPAUSAL BLEEDING: ICD-10-CM

## 2022-03-02 DIAGNOSIS — E78.5 DYSLIPIDEMIA: ICD-10-CM

## 2022-03-02 PROCEDURE — 99214 OFFICE O/P EST MOD 30 MIN: CPT | Performed by: NURSE PRACTITIONER

## 2022-03-02 RX ORDER — ZOLPIDEM TARTRATE 5 MG/1
2.5-5 TABLET ORAL NIGHTLY PRN
Qty: 30 TABLET | Refills: 5 | Status: SHIPPED | OUTPATIENT
Start: 2022-03-02 | End: 2022-04-01

## 2022-03-02 ASSESSMENT — FIBROSIS 4 INDEX: FIB4 SCORE: 0.43

## 2022-03-02 ASSESSMENT — PATIENT HEALTH QUESTIONNAIRE - PHQ9: CLINICAL INTERPRETATION OF PHQ2 SCORE: 0

## 2022-03-02 NOTE — LETTER
[facility]  Comfort Grijalva, A.P.R.N.  [address] [facility] [address]  [place] [address]  Fax: [phone]   Authorization for Release/Disclosure of   Protected Health Information   Name: VOLODYMYR LIM : 1970 SSN: [id number]   Address: [address] [place] [address]   [place] [address] Phone:    [phone] (home)    I authorize the entity listed below to release/disclose the PHI below to:   [facility]/Comfort Grijalva, A.P.R.N. and Comfort Grijalva, A.P.R.N.   Provider or Entity Name:  Dr. Chloe Eller ([facility])   Address   City, [facility], [facility]   Phone:      Fax:     Reason for request: continuity of care   Information to be released:    [  ] LAST COLONOSCOPY,  including any PATH REPORT and follow-up  [  ] LAST FIT/COLOGUARD RESULT [  ] LAST DEXA  [  ] LAST MAMMOGRAM  [x  ] LAST PAP  [  ] LAST LABS [  ] RETINA EXAM REPORT  [  ] IMMUNIZATION RECORDS  [x  ] Release all info      [  ] Check here and initial the line next to each item to release ALL health information INCLUDING  _____ Care and treatment for drug and / or alcohol abuse  _____ HIV testing, infection status, or AIDS  _____ Genetic Testing    DATES OF SERVICE OR TIME PERIOD TO BE DISCLOSED: _____________  I understand and acknowledge that:  * This Authorization may be revoked at any time by you in writing, except if your health information has already been used or disclosed.  * Your health information that will be used or disclosed as a result of you signing this authorization could be re-disclosed by the recipient. If this occurs, your re-disclosed health information may no longer be protected by State or Federal laws.  * You may refuse to sign this Authorization. Your refusal will not affect your ability to obtain treatment.  * This Authorization becomes effective upon signing and will  on (date) __________.      If no date is indicated, this Authorization will  one (1) year from the signature date.    Name: Volodymyr MATSON  Held    Signature:   Date:     3/2/2022       PLEASE FAX REQUESTED RECORDS BACK TO: [phone]

## 2022-03-02 NOTE — LETTER
Request for Medical Records    Patient Name: Alis Gonzalez    : 1970      Dear Doctor: [facility] - Caleb Benitez    The above named patient receives primary care at the [facility] by RALPH Burt.  The patient informs us that you are her eye care Provider.    Please fax a copy of the most recent eye exam to [phone] or answer the  questions below and fax this sheet back to us at the above number.  Attached is a signed Release of Information.      Date of last eye exam: _____________    Retinal eye exam summary:        Please select the choice(s) that apply.    ____ No diabetic retinopathy    ____    Diabetic retinopathy present      Printed Name and Credentials: __________________________________    Signature of Eye Care Provider: _________________________________    We appreciate your assistance and collaboration in providing efficient patient care!    Kindest Regards,    [facility]  JULIANE   [address] [phone]

## 2022-03-07 ENCOUNTER — HOSPITAL ENCOUNTER (OUTPATIENT)
Dept: LAB | Facility: MEDICAL CENTER | Age: 52
End: 2022-03-07
Attending: NURSE PRACTITIONER
Payer: COMMERCIAL

## 2022-03-07 DIAGNOSIS — E11.9 TYPE 2 DIABETES MELLITUS WITHOUT COMPLICATION, WITHOUT LONG-TERM CURRENT USE OF INSULIN (HCC): ICD-10-CM

## 2022-03-07 DIAGNOSIS — N95.0 POSTMENOPAUSAL BLEEDING: ICD-10-CM

## 2022-03-07 LAB
BASOPHILS # BLD AUTO: 0.5 % (ref 0–1.8)
BASOPHILS # BLD: 0.05 K/UL (ref 0–0.12)
EOSINOPHIL # BLD AUTO: 0.74 K/UL (ref 0–0.51)
EOSINOPHIL NFR BLD: 7.3 % (ref 0–6.9)
ERYTHROCYTE [DISTWIDTH] IN BLOOD BY AUTOMATED COUNT: 42.5 FL (ref 35.9–50)
EST. AVERAGE GLUCOSE BLD GHB EST-MCNC: 143 MG/DL
ESTRADIOL SERPL-MCNC: 10.5 PG/ML
FSH SERPL-ACNC: 39.2 MIU/ML
HBA1C MFR BLD: 6.6 % (ref 4–5.6)
HCT VFR BLD AUTO: 42.6 % (ref 37–47)
HGB BLD-MCNC: 14.4 G/DL (ref 12–16)
IMM GRANULOCYTES # BLD AUTO: 0.03 K/UL (ref 0–0.11)
IMM GRANULOCYTES NFR BLD AUTO: 0.3 % (ref 0–0.9)
LH SERPL-ACNC: 22.5 IU/L
LYMPHOCYTES # BLD AUTO: 2.57 K/UL (ref 1–4.8)
LYMPHOCYTES NFR BLD: 25.5 % (ref 22–41)
MCH RBC QN AUTO: 30.2 PG (ref 27–33)
MCHC RBC AUTO-ENTMCNC: 33.8 G/DL (ref 33.6–35)
MCV RBC AUTO: 89.3 FL (ref 81.4–97.8)
MONOCYTES # BLD AUTO: 0.56 K/UL (ref 0–0.85)
MONOCYTES NFR BLD AUTO: 5.6 % (ref 0–13.4)
NEUTROPHILS # BLD AUTO: 6.13 K/UL (ref 2–7.15)
NEUTROPHILS NFR BLD: 60.8 % (ref 44–72)
NRBC # BLD AUTO: 0 K/UL
NRBC BLD-RTO: 0 /100 WBC
PLATELET # BLD AUTO: 469 K/UL (ref 164–446)
PMV BLD AUTO: 9.4 FL (ref 9–12.9)
PROLACTIN SERPL-MCNC: 9.96 NG/ML (ref 2.8–26)
RBC # BLD AUTO: 4.77 M/UL (ref 4.2–5.4)
TSH SERPL DL<=0.005 MIU/L-ACNC: 3.18 UIU/ML (ref 0.38–5.33)
WBC # BLD AUTO: 10.1 K/UL (ref 4.8–10.8)

## 2022-03-07 PROCEDURE — 84443 ASSAY THYROID STIM HORMONE: CPT

## 2022-03-07 PROCEDURE — 84146 ASSAY OF PROLACTIN: CPT

## 2022-03-07 PROCEDURE — 84270 ASSAY OF SEX HORMONE GLOBUL: CPT

## 2022-03-07 PROCEDURE — 82670 ASSAY OF TOTAL ESTRADIOL: CPT

## 2022-03-07 PROCEDURE — 83002 ASSAY OF GONADOTROPIN (LH): CPT

## 2022-03-07 PROCEDURE — 84402 ASSAY OF FREE TESTOSTERONE: CPT

## 2022-03-07 PROCEDURE — 85025 COMPLETE CBC W/AUTO DIFF WBC: CPT

## 2022-03-07 PROCEDURE — 84403 ASSAY OF TOTAL TESTOSTERONE: CPT

## 2022-03-07 PROCEDURE — 83001 ASSAY OF GONADOTROPIN (FSH): CPT

## 2022-03-07 PROCEDURE — 36415 COLL VENOUS BLD VENIPUNCTURE: CPT

## 2022-03-07 PROCEDURE — 83036 HEMOGLOBIN GLYCOSYLATED A1C: CPT

## 2022-03-16 LAB
SHBG SERPL-SCNC: 20 NMOL/L (ref 17–125)
TESTOST FREE SERPL-MCNC: 2.7 PG/ML (ref 0.6–3.8)
TESTOST SERPL-MCNC: 13 NG/DL (ref 9–55)

## 2022-04-04 DIAGNOSIS — G47.00 INSOMNIA, UNSPECIFIED TYPE: ICD-10-CM

## 2022-04-04 RX ORDER — ZOLPIDEM TARTRATE 5 MG/1
TABLET ORAL
Qty: 30 TABLET | Refills: 4 | Status: SHIPPED | OUTPATIENT
Start: 2022-04-04 | End: 2023-01-03

## 2022-04-04 RX ORDER — LISINOPRIL 20 MG/1
20 TABLET ORAL DAILY
Qty: 90 TABLET | Refills: 2 | Status: SHIPPED | OUTPATIENT
Start: 2022-04-04 | End: 2023-03-03 | Stop reason: SDUPTHER

## 2022-05-03 ENCOUNTER — OFFICE VISIT (OUTPATIENT)
Dept: MEDICAL GROUP | Facility: MEDICAL CENTER | Age: 52
End: 2022-05-03
Payer: COMMERCIAL

## 2022-05-03 DIAGNOSIS — E03.8 OTHER SPECIFIED HYPOTHYROIDISM: ICD-10-CM

## 2022-05-03 DIAGNOSIS — N95.0 POSTMENOPAUSAL BLEEDING: ICD-10-CM

## 2022-05-03 DIAGNOSIS — E11.9 TYPE 2 DIABETES MELLITUS WITHOUT COMPLICATION, WITHOUT LONG-TERM CURRENT USE OF INSULIN (HCC): ICD-10-CM

## 2022-05-03 DIAGNOSIS — R14.0 ABDOMINAL BLOATING: ICD-10-CM

## 2022-05-03 DIAGNOSIS — I10 ESSENTIAL HYPERTENSION: ICD-10-CM

## 2022-05-03 DIAGNOSIS — R19.8 RECTAL FULLNESS: ICD-10-CM

## 2022-05-03 DIAGNOSIS — R19.4 CHANGE IN BOWEL HABITS: ICD-10-CM

## 2022-05-03 PROCEDURE — 99214 OFFICE O/P EST MOD 30 MIN: CPT | Performed by: NURSE PRACTITIONER

## 2022-05-03 RX ORDER — HYDROCORTISONE ACETATE 25 MG/1
25 SUPPOSITORY RECTAL EVERY 12 HOURS
Qty: 12 SUPPOSITORY | Refills: 0 | Status: SHIPPED | OUTPATIENT
Start: 2022-05-03 | End: 2022-05-09

## 2022-05-03 NOTE — PROGRESS NOTES
Chief Complaint   Patient presents with   • Insomnia     fv         Subjective:     HPI:     Alis Gonzalez is a 51 y.o. female   here to discuss the evaluation and management of:     Patient presents today with concerns regarding constipation as well as change in consistency of her stool.  Over the last 2-1/2 to 3 weeks has had sensation of rectal fullness, abdominal bloating and change in her stool caliber, describes as thinner. Denies nausea, vomiting, blood or mucus in her stool or rectal itching.  Denies any noticeable external hemorrhoids.  She is wondering if she has any internal hemorrhoids however not currently having any bleeding on her stool.  No hx of hemorrhoids.  She is uncomfortable with rectal. She states she tried over-the-counter suppository and some sort of fleets product however it did not appear to be a full sized enema.  Has been staying hydrate.  She is up-to-date on her colonoscopy.    Have also reviewed labs.    Type 2 diabetes mellitus without complication, without long-term current use of insulin (HCC)  Most recent A1c on labs at 6.6%.  Have recommended for patient to continue to metformin.    Postmenopausal bleeding  Since her last visit she has not had any further episodes of this.  Still has pending pelvic ultrasound.  Recommend getting scheduled.    Essential hypertension  On lisinopril 20 mg daily.  Most recent GFR above 60.    Other specified hypothyroidism  Taking levothyroxine 70 mcg for this.  Most recent TSH at 3.180.    ROS:  Denies any Headache, Blurred Vision, Confusion, Chest pain,  Shortness of breath,  Abdominal pain, Changes of bowel or bladder, Lower ext edema, Fevers, Nights sweats, Weight Changes, Focal weakness or numbness.  And all other systems reviewed and are all negative. POSITIVE FOR : see above        Current Outpatient Medications:   •  hydrocortisone (ANUSOL-HC) 25 MG Suppos, Insert 1 Suppository into the rectum every 12 hours for 6 days., Disp: 12  Suppository, Rfl: 0  •  zolpidem (AMBIEN) 5 MG Tab, TAKE 1 TABLET BY MOUTH AT BEDTIME AS NEEDED FOR SLEEP.G47/30D, Disp: 30 Tablet, Rfl: 4  •  lisinopril (PRINIVIL) 20 MG Tab, TAKE 1 TABLET BY MOUTH EVERY DAY, Disp: 90 Tablet, Rfl: 2  •  triamcinolone acetonide (KENALOG) 0.025 % Cream, , Disp: , Rfl:   •  Blood Glucose Monitoring Suppl (ONE TOUCH ULTRA 2) w/Device Kit, USE AS DIRECTED, Disp: , Rfl:   •  ONETOUCH ULTRA strip, USE ONE STRIP TO TEST BLOOD SUGAR ONCE DAILY ., Disp: , Rfl:   •  VENTOLIN  (90 Base) MCG/ACT Aero Soln inhalation aerosol, Inhale 1-2 Puffs every four hours as needed., Disp: 18 g, Rfl: 6  •  Blood Glucose Test Strips, Use one  strip to test blood sugar once daily ., Disp: 100 Strip, Rfl: 2  •  Lancets, Use one  lancet to test blood sugar once daily ., Disp: 100 Each, Rfl: 2  •  Blood Glucose Meter Kit, Test blood sugar as recommended by provider.  Pharmacy/insurance preference blood glucose monitoring kit., Disp: 1 Kit, Rfl: 0  •  Alcohol Swabs, Wipe site with prep pad prior to injection., Disp: 100 Each, Rfl: 2  •  levothyroxine (SYNTHROID) 75 MCG Tab, TAKE 1 TAB BY MOUTH EVERY MORNING ON AN EMPTY STOMACH., Disp: 90 tablet, Rfl: 3  •  DUPIXENT 300 MG/2ML Solution Prefilled Syringe injection, , Disp: , Rfl:     Allergies   Allergen Reactions   • Azithromycin Rash     All over body   • Ciprofloxacin Rash     All over body       Past Medical History:   Diagnosis Date   • Alopecia 2015   • Asthma    • Daytime sleepiness    • Fatigue    • Insomnia    • Thyroid disease      No past surgical history on file.  Family History   Problem Relation Age of Onset   • Thyroid Mother         cancer-40's   • Cancer Father         skin-melanoma   • Hyperlipidemia Father    • Cancer Maternal Aunt         breast to brain   • Breast Cancer Maternal Aunt      Social History     Socioeconomic History   • Marital status: Single     Spouse name: Not on file   • Number of children: Not on file   • Years of  education: Not on file   • Highest education level: Not on file   Occupational History   • Not on file   Tobacco Use   • Smoking status: Never Smoker   • Smokeless tobacco: Never Used   Vaping Use   • Vaping Use: Never used   Substance and Sexual Activity   • Alcohol use: Not Currently     Comment: rarely   • Drug use: No   • Sexual activity: Yes     Partners: Male   Other Topics Concern   • Not on file   Social History Narrative   • Not on file     Social Determinants of Health     Financial Resource Strain: Not on file   Food Insecurity: Not on file   Transportation Needs: Not on file   Physical Activity: Not on file   Stress: Not on file   Social Connections: Not on file   Intimate Partner Violence: Not on file   Housing Stability: Not on file       Objective:     Vitals: There were no vitals taken for this visit.   General: Alert, pleasant, NAD  HEENT: Normocephalic.  Neck supple.   Respiratory: no distress, no audible wheezing, RR -WNL  Skin: Warm, dry, no rashes.  Extremities: No leg edema. No discoloration  Neurological: No tremors  Psych:  Affect/mood is normal, judgement is good, memory is intact, grooming is appropriate.    Assessment/Plan:     Alis was seen today for insomnia.    Diagnoses and all orders for this visit:    Change in bowel habits  Abdominal bloating  Acute problem no acute distress. No bleeding.  No significant abdominal pain.  Recommend magnesium citrate and Dulcolax time.  Recommend Epson salt baths to help relax abdominal and perineal muscles.  Continue with hydration.  ER precautions.  Have ordered x-ray to evaluate for any potential abdominal mass obstructing.  She will message if symptoms or not improving.   -     [id number] VIEW; Future  -     hydrocortisone (ANUSOL-HC) 25 MG Suppos; Insert 1 Suppository into the rectum every 12 hours for 6 days.    Rectal fullness  Acute problem consistent with possible internal hemorrhoids although we have discussed possible differentials  including rectal mass.  Recommend Anusol HC suppository for at least 1 week to see if this can help shrink the hemorrhoids and allow for easier passage of stool.  -     hydrocortisone (ANUSOL-HC) 25 MG Suppos; Insert 1 Suppository into the rectum every 12 hours for 6 days.    Type 2 diabetes mellitus without complication, without long-term current use of insulin (HCC)  A1c is 6.6% would recommend starting back on metformin.    Postmenopausal bleeding  No further episodes.  Still recommend getting the son completed.    Essential hypertension  This is stable with.  GFR above 60.  Continue current regimen    Other specified hypothyroidism  TSH stable.  Continue current     Return if symptoms worsen or fail to improve.          Comfort GILLIS.

## 2022-05-10 DIAGNOSIS — E03.8 OTHER SPECIFIED HYPOTHYROIDISM: ICD-10-CM

## 2022-05-10 RX ORDER — LEVOTHYROXINE SODIUM 0.07 MG/1
75 TABLET ORAL
Qty: 90 TABLET | Refills: 3 | Status: SHIPPED | OUTPATIENT
Start: 2022-05-10 | End: 2023-02-07

## 2022-05-11 ENCOUNTER — HOSPITAL ENCOUNTER (OUTPATIENT)
Dept: RADIOLOGY | Facility: MEDICAL CENTER | Age: 52
End: 2022-05-11
Attending: NURSE PRACTITIONER
Payer: COMMERCIAL

## 2022-05-11 DIAGNOSIS — R19.4 CHANGE IN BOWEL HABITS: ICD-10-CM

## 2022-05-11 DIAGNOSIS — R14.0 ABDOMINAL BLOATING: ICD-10-CM

## 2022-05-11 PROCEDURE — 74018 RADEX ABDOMEN 1 VIEW: CPT

## 2022-06-20 ENCOUNTER — TELEMEDICINE (OUTPATIENT)
Dept: TELEHEALTH | Facility: TELEMEDICINE | Age: 52
End: 2022-06-20
Payer: COMMERCIAL

## 2022-06-20 ENCOUNTER — PATIENT MESSAGE (OUTPATIENT)
Dept: MEDICAL GROUP | Facility: CLINIC | Age: 52
End: 2022-06-20

## 2022-06-20 VITALS — HEIGHT: 65 IN | WEIGHT: 200 LBS | BODY MASS INDEX: 33.32 KG/M2

## 2022-06-20 DIAGNOSIS — U07.1 COVID-19 VIRUS INFECTION: ICD-10-CM

## 2022-06-20 DIAGNOSIS — J45.40 MODERATE PERSISTENT ASTHMA WITHOUT COMPLICATION: ICD-10-CM

## 2022-06-20 PROCEDURE — 99214 OFFICE O/P EST MOD 30 MIN: CPT | Mod: 95 | Performed by: FAMILY MEDICINE

## 2022-06-20 RX ORDER — ALBUTEROL SULFATE 0.63 MG/3ML
0.63 SOLUTION RESPIRATORY (INHALATION) EVERY 4 HOURS PRN
Qty: 3 ML | Refills: 1 | Status: SHIPPED | OUTPATIENT
Start: 2022-06-20

## 2022-06-20 ASSESSMENT — FIBROSIS 4 INDEX: FIB4 SCORE: 0.47

## 2022-06-20 NOTE — PROGRESS NOTES
Virtual Visit: Established Patient   This visit was conducted via Zoom using secure and encrypted videoconferencing technology.   The patient was in their home in the state of [place].    The patient's identity was confirmed and verbal consent was obtained for this virtual visit.     Subjective:   CC:   Chief Complaint   Patient presents with   • Coronavirus Screening     Tested positive x 06/18; would like to discuss next steps of treatment       Alis Gonzalez is a 51 y.o. female presenting for evaluation and management of:    Pt tested positive for COVID at home this morning   Pt has been ill for 3 days   Having headaches, congestion  Has a mild cough which is productive   Has never had COVID before   Had immunizations (pfizer)   Hx of asthma and severe CRISELDA     Current medicines (including changes today)  Current Outpatient Medications   Medication Sig Dispense Refill   • albuterol (ACCUNEB) 0.63 MG/3ML nebulizer solution Take 3 mL by nebulization every four hours as needed for Shortness of Breath. 3 mL 1   • Nirmatrelvir & Ritonavir 20 x 150 MG & 10 x 100MG Tablet Therapy Pack Take 300 mg nirmatrelvir (two 150 mg tablets) with 100 mg  ritonavir (one 100 mg tablet) by mouth, with all three tablets taken together  twice daily for 5 days. 30 Each 0   • levothyroxine (SYNTHROID) 75 MCG Tab TAKE 1 TAB BY MOUTH EVERY MORNING ON AN EMPTY STOMACH. 90 Tablet 3   • lisinopril (PRINIVIL) 20 MG Tab TAKE 1 TABLET BY MOUTH EVERY DAY 90 Tablet 2   • VENTOLIN  (90 Base) MCG/ACT Aero Soln inhalation aerosol Inhale 1-2 Puffs every four hours as needed. 18 g 6   • Blood Glucose Test Strips Use one  strip to test blood sugar once daily . 100 Strip 2   • Lancets Use one  lancet to test blood sugar once daily . 100 Each 2   • Blood Glucose Meter Kit Test blood sugar as recommended by provider.  Pharmacy/insurance preference blood glucose monitoring kit. 1 Kit 0   • Alcohol Swabs Wipe site with prep pad prior to injection. 100  "Each 2   • DUPIXENT 300 MG/2ML Solution Prefilled Syringe injection      • Blood Glucose Monitoring Suppl (ONE TOUCH ULTRA 2) w/Device Kit USE AS DIRECTED     • ONETOUCH ULTRA strip USE ONE STRIP TO TEST BLOOD SUGAR ONCE DAILY .       No current facility-administered medications for this visit.       Patient Active Problem List    Diagnosis Date Noted   • Severe obstructive sleep apnea 10/23/2020   • Dyslipidemia 04/30/2020   • Essential hypertension 04/30/2020   • Diabetes (HCC) 04/13/2020   • Secondary thrombocytosis 07/03/2019   • Fatty liver 07/03/2019   • Eosinophilia 07/03/2019   • Insomnia 01/08/2018   • Other specified hypothyroidism 01/08/2018   • Vitamin D insufficiency 01/08/2018   • Obesity (BMI 30-39.9) 01/08/2018   • Allergic eczema 01/08/2018   • Moderate persistent asthma without complication    • Alopecia 05/27/2016        Objective:   Ht 1.651 m (5' 5\")   Wt 90.7 kg (200 lb)   BMI 33.28 kg/m²   Pt states 99F     Physical Exam:  Constitutional: Alert, no distress, well-groomed.  Skin: No rashes in visible areas.  Eye: Round. Conjunctiva clear, lids normal. No icterus.   ENMT: Lips pink without lesions, good dentition, moist mucous membranes. Phonation normal.  Respiratory: Unlabored respiratory effort, +cough intermittently   Psych: Alert and oriented x3, normal affect and mood.     Assessment and Plan:   The following treatment plan was discussed:     1. COVID-19 virus infection  - Nirmatrelvir & Ritonavir 20 x 150 MG & 10 x 100MG Tablet Therapy Pack; Take 300 mg nirmatrelvir (two 150 mg tablets) with 100 mg  ritonavir (one 100 mg tablet) by mouth, with all three tablets taken together  twice daily for 5 days.  Dispense: 30 Each; Refill: 0    2. Moderate persistent asthma without complication  - albuterol (ACCUNEB) 0.63 MG/3ML nebulizer solution; Take 3 mL by nebulization every four hours as needed for Shortness of Breath.  Dispense: 3 mL; Refill: 1    Patient with COVID-19 infection.  Had a " discussion of the expected course recovery and supportive care measures.  Reviewed the risks and benefits of antiviral therapy due to asthma and other risk factors.  Patient prefers to try antiviral therapy and prescription sent to her pharmacy.  Reviewed follow-up precautions and ER precautions.  Follow-up as needed.    Follow-up: Return if symptoms worsen or fail to improve.

## 2022-06-27 RX ORDER — BLOOD SUGAR DIAGNOSTIC
STRIP MISCELLANEOUS
Qty: 100 STRIP | Refills: 2 | Status: SHIPPED | OUTPATIENT
Start: 2022-06-27 | End: 2023-06-06

## 2022-09-01 ENCOUNTER — HOSPITAL ENCOUNTER (OUTPATIENT)
Dept: LAB | Facility: MEDICAL CENTER | Age: 52
End: 2022-09-01
Attending: NURSE PRACTITIONER
Payer: COMMERCIAL

## 2022-09-01 DIAGNOSIS — E78.5 DYSLIPIDEMIA: ICD-10-CM

## 2022-09-01 LAB
CHOLEST SERPL-MCNC: 186 MG/DL (ref 100–199)
FASTING STATUS PATIENT QL REPORTED: NORMAL
HDLC SERPL-MCNC: 35 MG/DL
LDLC SERPL CALC-MCNC: 128 MG/DL
TRIGL SERPL-MCNC: 116 MG/DL (ref 0–149)

## 2022-09-01 PROCEDURE — 36415 COLL VENOUS BLD VENIPUNCTURE: CPT

## 2022-09-01 PROCEDURE — 80061 LIPID PANEL: CPT

## 2022-10-20 ENCOUNTER — OFFICE VISIT (OUTPATIENT)
Dept: URGENT CARE | Facility: PHYSICIAN GROUP | Age: 52
End: 2022-10-20
Payer: COMMERCIAL

## 2022-10-20 VITALS
HEIGHT: 65 IN | WEIGHT: 200 LBS | SYSTOLIC BLOOD PRESSURE: 158 MMHG | DIASTOLIC BLOOD PRESSURE: 90 MMHG | RESPIRATION RATE: 16 BRPM | TEMPERATURE: 97.8 F | BODY MASS INDEX: 33.32 KG/M2 | OXYGEN SATURATION: 95 % | HEART RATE: 91 BPM

## 2022-10-20 DIAGNOSIS — J02.0 PHARYNGITIS DUE TO STREPTOCOCCUS SPECIES: ICD-10-CM

## 2022-10-20 DIAGNOSIS — R03.0 ELEVATED BLOOD PRESSURE READING: ICD-10-CM

## 2022-10-20 LAB
INT CON NEG: NEGATIVE
INT CON POS: POSITIVE
S PYO AG THROAT QL: POSITIVE

## 2022-10-20 PROCEDURE — 87880 STREP A ASSAY W/OPTIC: CPT

## 2022-10-20 PROCEDURE — 99213 OFFICE O/P EST LOW 20 MIN: CPT

## 2022-10-20 RX ORDER — DEXAMETHASONE SODIUM PHOSPHATE 10 MG/ML
10 INJECTION INTRAMUSCULAR; INTRAVENOUS ONCE
Status: DISCONTINUED | OUTPATIENT
Start: 2022-10-20 | End: 2022-10-20

## 2022-10-20 RX ORDER — DEXAMETHASONE SODIUM PHOSPHATE 4 MG/ML
10 INJECTION, SOLUTION INTRA-ARTICULAR; INTRALESIONAL; INTRAMUSCULAR; INTRAVENOUS; SOFT TISSUE ONCE
Status: COMPLETED | OUTPATIENT
Start: 2022-10-20 | End: 2022-10-20

## 2022-10-20 RX ORDER — PENICILLIN V POTASSIUM 500 MG/1
500 TABLET ORAL 3 TIMES DAILY
Qty: 30 TABLET | Refills: 0 | Status: SHIPPED | OUTPATIENT
Start: 2022-10-20 | End: 2022-10-30

## 2022-10-20 RX ADMIN — DEXAMETHASONE SODIUM PHOSPHATE 10 MG: 4 INJECTION, SOLUTION INTRA-ARTICULAR; INTRALESIONAL; INTRAMUSCULAR; INTRAVENOUS; SOFT TISSUE at 12:31

## 2022-10-20 ASSESSMENT — FIBROSIS 4 INDEX: FIB4 SCORE: 0.48

## 2022-10-20 ASSESSMENT — ENCOUNTER SYMPTOMS
HEMOPTYSIS: 0
SORE THROAT: 1
STRIDOR: 0
FEVER: 0
CHILLS: 0
WEIGHT LOSS: 0
SINUS PAIN: 0
COUGH: 0
WHEEZING: 0
DIAPHORESIS: 0
SPUTUM PRODUCTION: 0
SHORTNESS OF BREATH: 0

## 2022-10-20 NOTE — LETTER
October 20, 2022    To Whom It May Concern:         This is confirmation that Alis Concepcion Carlos attended her scheduled appointment with RALPH Alejo on 10/20/22. Please excuse her from work on 10/21/22.          If you have any questions please do not hesitate to call me at the phone number listed below.      Sincerely,        RALPH Alejo   Electronically signed  [phone]

## 2022-10-20 NOTE — PROGRESS NOTES
Subjective:   Alis Gonzalez is a 52 y.o. female who presents for Sore Throat (X 5 days, loss of voice, nasal congestion, post nasal drip.)      HPI: This is a 52-year-old female who presents today for sore throat.  Patient reports sore throat and hoarseness x5 days.  This is a new problem.  She reports pain is 4\10.  Throat discomfort is equal on both sides.  She has been taking ibuprofen for this.  She denies history of strep.  She denies sick contacts.  She denies fevers, chills, body aches.    Review of Systems   Constitutional:  Negative for chills, diaphoresis, fever, malaise/fatigue and weight loss.   HENT:  Positive for congestion and sore throat. Negative for ear pain and sinus pain.    Respiratory:  Negative for cough, hemoptysis, sputum production, shortness of breath, wheezing and stridor.    All other systems reviewed and are negative.    Medications:    Current Outpatient Medications on File Prior to Visit   Medication Sig Dispense Refill    ONETOUCH ULTRA strip USE ONE STRIP TO TEST BLOOD SUGAR ONCE DAILY . 100 Strip 2    albuterol (ACCUNEB) 0.63 MG/3ML nebulizer solution Take 3 mL by nebulization every four hours as needed for Shortness of Breath. 3 mL 1    levothyroxine (SYNTHROID) 75 MCG Tab TAKE 1 TAB BY MOUTH EVERY MORNING ON AN EMPTY STOMACH. 90 Tablet 3    lisinopril (PRINIVIL) 20 MG Tab TAKE 1 TABLET BY MOUTH EVERY DAY 90 Tablet 2    Blood Glucose Monitoring Suppl (ONE TOUCH ULTRA 2) w/Device Kit USE AS DIRECTED      VENTOLIN  (90 Base) MCG/ACT Aero Soln inhalation aerosol Inhale 1-2 Puffs every four hours as needed. 18 g 6    Blood Glucose Test Strips Use one  strip to test blood sugar once daily . 100 Strip 2    Lancets Use one  lancet to test blood sugar once daily . 100 Each 2    Blood Glucose Meter Kit Test blood sugar as recommended by provider.  Pharmacy/insurance preference blood glucose monitoring kit. 1 Kit 0    Alcohol Swabs Wipe site with prep pad prior to injection. 100  "Each 2    DUPIXENT 300 MG/2ML Solution Prefilled Syringe injection        No current facility-administered medications on file prior to visit.        Allergies:   Azithromycin and Ciprofloxacin    Problem List:   Patient Active Problem List   Diagnosis    Alopecia    Insomnia    Other specified hypothyroidism    Vitamin D insufficiency    Obesity (BMI 30-39.9)    Allergic eczema    Moderate persistent asthma without complication    Secondary thrombocytosis    Fatty liver    Eosinophilia    Diabetes (HCC)    Dyslipidemia    Essential hypertension    Severe obstructive sleep apnea        Surgical History:  No past surgical history on file.    Past Social Hx:   Social History     Tobacco Use    Smoking status: Never    Smokeless tobacco: Never   Vaping Use    Vaping Use: Never used   Substance Use Topics    Alcohol use: Not Currently     Comment: rarely    Drug use: No          Problem list, medications, and allergies reviewed by myself today in Epic.     Objective:     BP (!) 158/90 (BP Location: Right arm, Patient Position: Sitting, BP Cuff Size: Adult)   Pulse 91   Temp 36.6 °C (97.8 °F) (Temporal)   Resp 16   Ht 1.651 m (5' 5\")   Wt 90.7 kg (200 lb)   SpO2 95%   BMI 33.28 kg/m²     Physical Exam  Vitals and nursing note reviewed.   Constitutional:       General: She is awake. She is not in acute distress.     Appearance: Normal appearance. She is normal weight. She is not ill-appearing, toxic-appearing or diaphoretic.   HENT:      Head: Normocephalic and atraumatic.      Right Ear: Tympanic membrane, ear canal and external ear normal. There is no impacted cerumen.      Left Ear: Tympanic membrane, ear canal and external ear normal. There is no impacted cerumen.      Nose: Nose normal. No congestion or rhinorrhea.      Mouth/Throat:      Mouth: Mucous membranes are moist.      Pharynx: Oropharynx is clear. Posterior oropharyngeal erythema present. No oropharyngeal exudate.   Cardiovascular:      Rate and " Rhythm: Normal rate and regular rhythm.      Pulses: Normal pulses.      Heart sounds: Normal heart sounds. No murmur heard.    No friction rub. No gallop.   Pulmonary:      Effort: Pulmonary effort is normal. No respiratory distress.      Breath sounds: Normal breath sounds. No stridor. No wheezing, rhonchi or rales.   Chest:      Chest wall: No tenderness.   Musculoskeletal:      Cervical back: Neck supple. No tenderness.   Lymphadenopathy:      Cervical: Cervical adenopathy present.   Skin:     General: Skin is warm and dry.      Capillary Refill: Capillary refill takes less than 2 seconds.   Neurological:      General: No focal deficit present.      Mental Status: She is alert and oriented to person, place, and time. Mental status is at baseline.      Cranial Nerves: No cranial nerve deficit.      Motor: No weakness.      Gait: Gait normal.   Psychiatric:         Mood and Affect: Mood normal.         Behavior: Behavior normal. Behavior is cooperative.         Thought Content: Thought content normal.         Judgment: Judgment normal.       Assessment/Plan:     Diagnosis and associated orders:   1. Pharyngitis due to Streptococcus species  dexamethasone (DECADRON) injection 10 mg    penicillin v potassium (VEETID) 500 MG Tab    DISCONTINUED: dexamethasone (DECADRON) injection (check route below) 10 mg      2. Elevated blood pressure reading              Comments/MDM:   Pt is clinically stable at today's acute urgent care visit.  No acute distress noted. Appropriate for outpatient management at this time.     Acute problem.  Patient is not ill or toxic appearing in clinic today.  Vital signs are stable, she is afebrile.  Rapid strep is positive.  Patient will be treated with penicillin  mg 3 times daily x10 days.  Patient also given Decadron 10 mg p.o. given in clinic for associated pharyngitis.  Advised patient to increase warm fluids, use warm fluids with lemon and honey for associated throat discomfort,  use Tylenol and ibuprofen.  She is to monitor symptoms and return for any new or worsening signs or symptoms.  Work note provided.  Patient noted to have elevated blood pressure reading today in clinic.  She does take lisinopril for this.  She denies chest pain, shortness of breath, headache, blurred vision.  I advised patient to continue taking pressure medication as prescribed, monitor blood pressure readings, and follow-up with PCP regarding this.  Patient agreeable with plan and verbalizes good understanding today.           Discussed DDx, management options (risks,benefits, and alternatives to planned treatment), natural progression and supportive care.  Expressed understanding and the treatment plan was agreed upon. Questions were encouraged and answered   Return to urgent care prn if new or worsening sx or if there is no improvement in condition prn.    Educated in Red flags and indications to immediately call 911 or present to the Emergency Department.   Advised the patient to follow-up with the primary care physician for recheck, reevaluation, and consideration of further management.    I personally reviewed prior external notes and test results pertinent to today's visit.  I have independently reviewed and interpreted all diagnostics ordered during this urgent care acute visit.       Please note that this dictation was created using voice recognition software. I have made a reasonable attempt to correct obvious errors, but I expect that there are errors of grammar and possibly content that I did not discover before finalizing the note.    This note was electronically signed by MG Nair

## 2022-10-29 ENCOUNTER — HOSPITAL ENCOUNTER (OUTPATIENT)
Dept: RADIOLOGY | Facility: MEDICAL CENTER | Age: 52
End: 2022-10-29
Attending: NURSE PRACTITIONER
Payer: COMMERCIAL

## 2022-10-29 ENCOUNTER — OFFICE VISIT (OUTPATIENT)
Dept: URGENT CARE | Facility: PHYSICIAN GROUP | Age: 52
End: 2022-10-29
Payer: COMMERCIAL

## 2022-10-29 VITALS
OXYGEN SATURATION: 96 % | WEIGHT: 200 LBS | DIASTOLIC BLOOD PRESSURE: 88 MMHG | RESPIRATION RATE: 14 BRPM | HEIGHT: 65 IN | SYSTOLIC BLOOD PRESSURE: 134 MMHG | TEMPERATURE: 98.6 F | BODY MASS INDEX: 33.32 KG/M2 | HEART RATE: 86 BPM

## 2022-10-29 DIAGNOSIS — Z87.09 HISTORY OF STREP SORE THROAT: ICD-10-CM

## 2022-10-29 DIAGNOSIS — R22.1 LOCALIZED SWELLING, MASS AND LUMP, NECK: ICD-10-CM

## 2022-10-29 DIAGNOSIS — J02.0 STREP THROAT: ICD-10-CM

## 2022-10-29 LAB
INT CON NEG: NEGATIVE
INT CON POS: POSITIVE
S PYO AG THROAT QL: POSITIVE

## 2022-10-29 PROCEDURE — 99214 OFFICE O/P EST MOD 30 MIN: CPT | Performed by: NURSE PRACTITIONER

## 2022-10-29 PROCEDURE — 87880 STREP A ASSAY W/OPTIC: CPT | Performed by: NURSE PRACTITIONER

## 2022-10-29 PROCEDURE — 76536 US EXAM OF HEAD AND NECK: CPT

## 2022-10-29 RX ORDER — DEXAMETHASONE SODIUM PHOSPHATE 4 MG/ML
4 INJECTION, SOLUTION INTRA-ARTICULAR; INTRALESIONAL; INTRAMUSCULAR; INTRAVENOUS; SOFT TISSUE ONCE
Status: COMPLETED | OUTPATIENT
Start: 2022-10-29 | End: 2022-10-29

## 2022-10-29 RX ORDER — PREDNISONE 20 MG/1
40 TABLET ORAL DAILY
Qty: 10 TABLET | Refills: 0 | Status: SHIPPED | OUTPATIENT
Start: 2022-10-29 | End: 2022-11-03

## 2022-10-29 RX ORDER — CLINDAMYCIN HYDROCHLORIDE 300 MG/1
300 CAPSULE ORAL 3 TIMES DAILY
Qty: 30 CAPSULE | Refills: 0 | Status: SHIPPED | OUTPATIENT
Start: 2022-10-29 | End: 2022-11-08

## 2022-10-29 RX ADMIN — DEXAMETHASONE SODIUM PHOSPHATE 4 MG: 4 INJECTION, SOLUTION INTRA-ARTICULAR; INTRALESIONAL; INTRAMUSCULAR; INTRAVENOUS; SOFT TISSUE at 11:12

## 2022-10-29 ASSESSMENT — ENCOUNTER SYMPTOMS
SHORTNESS OF BREATH: 0
MYALGIAS: 0
ABDOMINAL PAIN: 0
CHILLS: 0
FEVER: 0
SORE THROAT: 0
EYE DISCHARGE: 0
WHEEZING: 0
DIZZINESS: 0
VOMITING: 0
HEADACHES: 0
NAUSEA: 0
COUGH: 0
NECK PAIN: 0
EYE REDNESS: 0
WEAKNESS: 0

## 2022-10-29 ASSESSMENT — FIBROSIS 4 INDEX: FIB4 SCORE: 0.48

## 2022-10-29 NOTE — PROGRESS NOTES
Subjective     Alis Gonzalez is a 52 y.o. female who presents with Swollen Glands (L side of neck is swollen, pt states no symptoms of sore throat or anything else. On ABX for strep currently. X 10 days)            HPI  States has been experiencing left-sided neck swelling.  Currently taking Pen-V antibiotics for strep x 10 days.  Denies sore throat or ear pain.  No difficulty with swallowing, speaking or breathing.  States does have a raspy voice.  Intermittent salt water gargle as well as ibuprofen use.  Did not have neck swelling when seen in urgent care 10 days ago when diagnosed with strep.    PMH:  has a past medical history of Alopecia (2015), Asthma, Daytime sleepiness, Fatigue, Insomnia, and Thyroid disease.    She has no past medical history of Apnea, sleep, Chills, Fever, Gasping for breath, Morning headache, Snoring, or Weight loss.  MEDS:   Current Outpatient Medications:     penicillin v potassium (VEETID) 500 MG Tab, Take 1 Tablet by mouth 3 times a day for 10 days., Disp: 30 Tablet, Rfl: 0    ONETOUCH ULTRA strip, USE ONE STRIP TO TEST BLOOD SUGAR ONCE DAILY ., Disp: 100 Strip, Rfl: 2    albuterol (ACCUNEB) 0.63 MG/3ML nebulizer solution, Take 3 mL by nebulization every four hours as needed for Shortness of Breath., Disp: 3 mL, Rfl: 1    levothyroxine (SYNTHROID) 75 MCG Tab, TAKE 1 TAB BY MOUTH EVERY MORNING ON AN EMPTY STOMACH., Disp: 90 Tablet, Rfl: 3    lisinopril (PRINIVIL) 20 MG Tab, TAKE 1 TABLET BY MOUTH EVERY DAY, Disp: 90 Tablet, Rfl: 2    Blood Glucose Monitoring Suppl (ONE TOUCH ULTRA 2) w/Device Kit, USE AS DIRECTED, Disp: , Rfl:     VENTOLIN  (90 Base) MCG/ACT Aero Soln inhalation aerosol, Inhale 1-2 Puffs every four hours as needed., Disp: 18 g, Rfl: 6    Blood Glucose Test Strips, Use one  strip to test blood sugar once daily ., Disp: 100 Strip, Rfl: 2    Lancets, Use one  lancet to test blood sugar once daily ., Disp: 100 Each, Rfl: 2    Blood Glucose Meter Kit, Test blood  "sugar as recommended by provider.  Pharmacy/insurance preference blood glucose monitoring kit., Disp: 1 Kit, Rfl: 0    Alcohol Swabs, Wipe site with prep pad prior to injection., Disp: 100 Each, Rfl: 2    DUPIXENT 300 MG/2ML Solution Prefilled Syringe injection, , Disp: , Rfl:     Current Facility-Administered Medications:     dexamethasone (DECADRON) injection 4 mg, 4 mg, Other, Once, Neli Carrillo A.P.R.NAndrew  ALLERGIES:   Allergies   Allergen Reactions    Azithromycin Rash     All over body    Ciprofloxacin Rash     All over body     SURGHX: History reviewed. No pertinent surgical history.  SOCHX:  reports that she has never smoked. She has never used smokeless tobacco. She reports that she does not currently use alcohol. She reports that she does not use drugs.  FH: Family history was reviewed, no pertinent findings to report    Review of Systems   Constitutional:  Negative for chills, fever and malaise/fatigue.   HENT:  Negative for congestion, ear pain and sore throat.         Left-sided neck swelling   Eyes:  Negative for discharge and redness.   Respiratory:  Negative for cough, shortness of breath and wheezing.    Gastrointestinal:  Negative for abdominal pain, nausea and vomiting.   Musculoskeletal:  Negative for myalgias and neck pain.   Skin:  Negative for itching and rash.   Neurological:  Negative for dizziness, weakness and headaches.   All other systems reviewed and are negative.           Objective     /88 (BP Location: Left arm, Patient Position: Sitting, BP Cuff Size: Adult)   Pulse 86   Temp 37 °C (98.6 °F) (Temporal)   Resp 14   Ht 1.651 m (5' 5\")   Wt 90.7 kg (200 lb)   SpO2 96%   BMI 33.28 kg/m²      Physical Exam  Vitals reviewed.   Constitutional:       General: She is awake. She is not in acute distress.     Appearance: Normal appearance. She is well-developed. She is not ill-appearing, toxic-appearing or diaphoretic.   HENT:      Head: Normocephalic.      Right Ear: " Tympanic membrane, ear canal and external ear normal.      Left Ear: Tympanic membrane, ear canal and external ear normal.      Nose: Nose normal.      Mouth/Throat:      Lips: Pink.      Mouth: Mucous membranes are moist.      Pharynx: Oropharynx is clear. Uvula midline. No pharyngeal swelling, oropharyngeal exudate, posterior oropharyngeal erythema or uvula swelling.      Tonsils: No tonsillar exudate or tonsillar abscesses. 1+ on the right. 1+ on the left.   Eyes:      Conjunctiva/sclera: Conjunctivae normal.   Cardiovascular:      Rate and Rhythm: Normal rate.   Pulmonary:      Effort: Pulmonary effort is normal.   Musculoskeletal:         General: Normal range of motion.      Cervical back: Normal range of motion and neck supple.   Skin:     General: Skin is warm and dry.   Neurological:      Mental Status: She is alert and oriented to person, place, and time.   Psychiatric:         Attention and Perception: Attention normal.         Mood and Affect: Mood normal.         Speech: Speech normal.         Behavior: Behavior normal. Behavior is cooperative.              US soft tissue neck   FINDINGS:  Focused ultrasound of the area of concern left neck demonstrates no mass, fluid collection or hematoma.     Survey of the bilateral lateral cervical lymph node chains reveals no suspicious-appearing lymph nodes.           Assessment & Plan        1. Localized swelling, mass and lump, neck    - US-SOFT TISSUES OF HEAD - NECK; Future  - POCT Rapid Strep A  - dexamethasone (DECADRON) injection 4 mg  - predniSONE (DELTASONE) 20 MG Tab; Take 2 Tablets by mouth every day for 5 days.  Dispense: 10 Tablet; Refill: 0    2. History of strep sore throat      3. Strep throat  -Rapid Strep still positive, will treat with another class of antibiotics at this time, possible resistant strain of Strep into local lymph node  - clindamycin (CLEOCIN) 300 MG Cap; Take 1 Capsule by mouth 3 times a day for 10 days.  Dispense: 30 Capsule;  Refill: 0    Will be treating for strep throat with additional antibiotics and oral steroids at this time  -Maintain hydration/water intake  -May use over the counter Ibuprofen/Tylenol as needed for any fever, body aches or throat pain  -Change toothbrush after 24 hrs of initiating antibiotics   -May gargle with salt water up to 4x/day as needed for throat discomfort (1 tsp salt dissolved in 1 cup warm water)  -Monitor for any difficulty swallowing, breathing, speaking, excessive drooling, fever, malaise and increased pain-recommend ER visit for further evaluation, patient understands this  Follow-up with primary care provider if swelling in throat continues without any fever or pain       -Education provided: see above    -Return to urgent care as needed if new or worsening symptoms  -Patient expresses understanding to treatment and plan of care  -Questions were encouraged and answered  -Advised to follow-up with the primary care provider for recheck, reevaluation, and consideration of further management as needed     -Time spent evaluating this patient was at least 30 minutes. This time comprises of the following: preparing for visit/chart review, patient history taken into account pertinent to symptoms, patient counseling/education for needed treatment outpatient, exam/evaluation/treatment plan provided, ordering any appropriate lab/test/procedures/meds, independent interpretation of any clinic testing, medication management with any other listed medications and chart documentation.

## 2022-11-04 ENCOUNTER — OFFICE VISIT (OUTPATIENT)
Dept: URGENT CARE | Facility: PHYSICIAN GROUP | Age: 52
End: 2022-11-04
Payer: COMMERCIAL

## 2022-11-04 VITALS
HEIGHT: 65 IN | HEART RATE: 101 BPM | BODY MASS INDEX: 33.32 KG/M2 | WEIGHT: 200 LBS | TEMPERATURE: 98.5 F | SYSTOLIC BLOOD PRESSURE: 162 MMHG | DIASTOLIC BLOOD PRESSURE: 80 MMHG | OXYGEN SATURATION: 95 %

## 2022-11-04 DIAGNOSIS — N30.01 ACUTE CYSTITIS WITH HEMATURIA: ICD-10-CM

## 2022-11-04 DIAGNOSIS — E11.9 TYPE 2 DIABETES MELLITUS WITHOUT COMPLICATION, WITHOUT LONG-TERM CURRENT USE OF INSULIN (HCC): ICD-10-CM

## 2022-11-04 PROCEDURE — 99213 OFFICE O/P EST LOW 20 MIN: CPT | Performed by: NURSE PRACTITIONER

## 2022-11-04 RX ORDER — NITROFURANTOIN 25; 75 MG/1; MG/1
100 CAPSULE ORAL 2 TIMES DAILY
Qty: 10 CAPSULE | Refills: 0 | Status: SHIPPED | OUTPATIENT
Start: 2022-11-04 | End: 2022-11-09

## 2022-11-04 ASSESSMENT — FIBROSIS 4 INDEX: FIB4 SCORE: 0.48

## 2022-11-06 ASSESSMENT — ENCOUNTER SYMPTOMS
CARDIOVASCULAR NEGATIVE: 1
MUSCULOSKELETAL NEGATIVE: 1
ROS GI COMMENTS: SUPRAPUBIC TENDERNESS
RESPIRATORY NEGATIVE: 1
ABDOMINAL PAIN: 1
FEVER: 0
CONSTITUTIONAL NEGATIVE: 1
EYES NEGATIVE: 1

## 2022-11-06 NOTE — PROGRESS NOTES
"Subjective:   Alis Gonzalez is a 52 y.o. female who presents for UTI (Very painful urgency,  x 3 days getting worse)      Patient presents with a three day history of dysuria and frequency.  She states that she has had UTIs in the past and reports that this is they way that she feels.  She denies fever, chills or night sweats. She endorses suprapubic tenderness but denies any flank pain.     UTI  This is a new problem. The current episode started in the past 7 days. The problem occurs constantly. The problem has been gradually worsening. Associated symptoms include abdominal pain. Pertinent negatives include no fever. Nothing aggravates the symptoms. She has tried drinking, rest and NSAIDs for the symptoms. The treatment provided mild relief.     Review of Systems   Constitutional: Negative.  Negative for fever.   HENT: Negative.     Eyes: Negative.    Respiratory: Negative.     Cardiovascular: Negative.    Gastrointestinal:  Positive for abdominal pain.        Suprapubic tenderness   Genitourinary:  Positive for dysuria and urgency.   Musculoskeletal: Negative.    Skin: Negative.      Medications, Allergies, and current problem list reviewed today in Epic.     Objective:     BP (!) 162/80 (BP Location: Left arm, Patient Position: Sitting)   Pulse (!) 101   Temp 36.9 °C (98.5 °F) (Temporal)   Ht 1.651 m (5' 5\")   Wt 90.7 kg (200 lb)   SpO2 95%     Physical Exam  Vitals reviewed.   Constitutional:       Appearance: Normal appearance.   HENT:      Head: Normocephalic and atraumatic.      Nose: Nose normal.      Mouth/Throat:      Mouth: Mucous membranes are moist.   Eyes:      Extraocular Movements: Extraocular movements intact.      Conjunctiva/sclera: Conjunctivae normal.      Pupils: Pupils are equal, round, and reactive to light.   Cardiovascular:      Rate and Rhythm: Normal rate and regular rhythm.   Pulmonary:      Effort: Pulmonary effort is normal.      Breath sounds: Normal breath sounds.   Abdominal: "      General: Abdomen is flat.      Palpations: Abdomen is soft.      Comments: Mild suprapubic tenderness   Musculoskeletal:         General: Normal range of motion.      Cervical back: Normal range of motion and neck supple.   Skin:     General: Skin is warm and dry.      Capillary Refill: Capillary refill takes less than 2 seconds.   Neurological:      General: No focal deficit present.      Mental Status: She is alert.   Psychiatric:         Mood and Affect: Mood normal.         Behavior: Behavior normal.       Assessment/Plan:     Diagnosis and associated orders:     1. Acute cystitis with hematuria  nitrofurantoin (MACROBID) 100 MG Cap         Comments/MDM:     The patient's presenting symptoms and exam findings are consistent with a simple urinary tract infection. They are overall very well-appearing with normal vital signs and benign examination findings.   Increase fluid intake.  Urine culture: will call back only if positive and if necessary change in therapy.   Advised to return to the Urgent Care or follow up with their PCP if symptoms are not improving in 2-3 days or sooner if any worsening symptoms such as fever, chills, abdominal pain, back/flank pain, nausea, vomiting, or any other concerns.            Differential diagnosis, natural history, supportive care, and indications for immediate follow-up discussed.    Advised the patient to follow-up with the primary care physician for recheck, reevaluation, and consideration of further management.    Please note that this dictation was created using voice recognition software. I have made a reasonable attempt to correct obvious errors, but I expect that there are errors of grammar and possibly content that I did not discover before finalizing the note.    This note was electronically signed by MG Clark

## 2022-11-10 RX ORDER — METFORMIN HYDROCHLORIDE 500 MG/1
TABLET, EXTENDED RELEASE ORAL
Qty: 180 TABLET | Refills: 3 | Status: SHIPPED | OUTPATIENT
Start: 2022-11-10 | End: 2023-06-06

## 2022-11-15 ENCOUNTER — OFFICE VISIT (OUTPATIENT)
Dept: MEDICAL GROUP | Facility: MEDICAL CENTER | Age: 52
End: 2022-11-15
Payer: COMMERCIAL

## 2022-11-15 VITALS
WEIGHT: 197 LBS | TEMPERATURE: 97.6 F | HEART RATE: 74 BPM | OXYGEN SATURATION: 96 % | BODY MASS INDEX: 32.82 KG/M2 | SYSTOLIC BLOOD PRESSURE: 150 MMHG | HEIGHT: 65 IN | DIASTOLIC BLOOD PRESSURE: 82 MMHG

## 2022-11-15 DIAGNOSIS — I10 ESSENTIAL HYPERTENSION: ICD-10-CM

## 2022-11-15 DIAGNOSIS — E03.8 OTHER SPECIFIED HYPOTHYROIDISM: ICD-10-CM

## 2022-11-15 DIAGNOSIS — E55.9 VITAMIN D INSUFFICIENCY: Chronic | ICD-10-CM

## 2022-11-15 DIAGNOSIS — E11.9 TYPE 2 DIABETES MELLITUS WITHOUT COMPLICATION, WITHOUT LONG-TERM CURRENT USE OF INSULIN (HCC): ICD-10-CM

## 2022-11-15 DIAGNOSIS — E78.5 DYSLIPIDEMIA: ICD-10-CM

## 2022-11-15 DIAGNOSIS — J31.0 RHINITIS, UNSPECIFIED TYPE: ICD-10-CM

## 2022-11-15 PROCEDURE — 99214 OFFICE O/P EST MOD 30 MIN: CPT | Performed by: NURSE PRACTITIONER

## 2022-11-15 ASSESSMENT — FIBROSIS 4 INDEX: FIB4 SCORE: 0.48

## 2022-11-15 NOTE — PROGRESS NOTES
Chief Complaint   Patient presents with    Congestion     Sx fail to improve, went to        Subjective:     HPI:     Alis Gonzalez is a 52 y.o. female   here to discuss the evaluation and management of:     Having runny nose, some sinus pressure and ear pressure. Symptoms are improving. No sore throat or fevers.     She also would like to discuss the following:    Diabetes  Has been resistant to taking any medications. Since last OV started to use a free trial of Freestyle Cora jeff along with a nutrionist. Feeling like she is eating better. Has acces to nutritionist-temporarily   She has started to take the Metformin 500 mg once daily.      Dyslipidemia  Resistant to starting on statin therapy.     Thyroid  Compliant with her Levothyroxine. Due for labs.    HTN  Having home readings 120/70's. Taking Lisinopril 20mg daily. Due for labs     ROS: : see above      Current Outpatient Medications:     metFORMIN ER (GLUCOPHAGE XR) 500 MG TABLET SR 24 HR, TAKE 1 TABLET BY MOUTH TWICE A DAY WITH MEALS, Disp: 180 Tablet, Rfl: 3    levothyroxine (SYNTHROID) 75 MCG Tab, TAKE 1 TAB BY MOUTH EVERY MORNING ON AN EMPTY STOMACH., Disp: 90 Tablet, Rfl: 3    lisinopril (PRINIVIL) 20 MG Tab, TAKE 1 TABLET BY MOUTH EVERY DAY, Disp: 90 Tablet, Rfl: 2    ONETOUCH ULTRA strip, USE ONE STRIP TO TEST BLOOD SUGAR ONCE DAILY . (Patient not taking: Reported on 11/4/2022), Disp: 100 Strip, Rfl: 2    albuterol (ACCUNEB) 0.63 MG/3ML nebulizer solution, Take 3 mL by nebulization every four hours as needed for Shortness of Breath. (Patient not taking: Reported on 11/4/2022), Disp: 3 mL, Rfl: 1    Blood Glucose Monitoring Suppl (ONE TOUCH ULTRA 2) w/Device Kit, USE AS DIRECTED, Disp: , Rfl:     VENTOLIN  (90 Base) MCG/ACT Aero Soln inhalation aerosol, Inhale 1-2 Puffs every four hours as needed. (Patient not taking: Reported on 11/4/2022), Disp: 18 g, Rfl: 6    Blood Glucose Test Strips, Use one  strip to test blood sugar once daily .  (Patient not taking: Reported on 11/4/2022), Disp: 100 Strip, Rfl: 2    Lancets, Use one  lancet to test blood sugar once daily . (Patient not taking: Reported on 11/4/2022), Disp: 100 Each, Rfl: 2    Blood Glucose Meter Kit, Test blood sugar as recommended by provider.  Pharmacy/insurance preference blood glucose monitoring kit. (Patient not taking: Reported on 11/4/2022), Disp: 1 Kit, Rfl: 0    Alcohol Swabs, Wipe site with prep pad prior to injection. (Patient not taking: Reported on 11/4/2022), Disp: 100 Each, Rfl: 2    DUPIXENT 300 MG/2ML Solution Prefilled Syringe injection, , Disp: , Rfl:     Allergies   Allergen Reactions    Azithromycin Rash     All over body    Ciprofloxacin Rash     All over body       Past Medical History:   Diagnosis Date    Alopecia 2015    Asthma     Daytime sleepiness     Fatigue     Insomnia     Thyroid disease      History reviewed. No pertinent surgical history.  Family History   Problem Relation Age of Onset    Thyroid Mother         cancer-40's    Cancer Father         skin-melanoma    Hyperlipidemia Father     Cancer Maternal Aunt         breast to brain    Breast Cancer Maternal Aunt      Social History     Socioeconomic History    Marital status: Single     Spouse name: Not on file    Number of children: Not on file    Years of education: Not on file    Highest education level: Not on file   Occupational History    Not on file   Tobacco Use    Smoking status: Never    Smokeless tobacco: Never   Vaping Use    Vaping Use: Never used   Substance and Sexual Activity    Alcohol use: Not Currently     Comment: rarely    Drug use: No    Sexual activity: Yes     Partners: Male   Other Topics Concern    Not on file   Social History Narrative    Not on file     Social Determinants of Health     Financial Resource Strain: Not on file   Food Insecurity: Not on file   Transportation Needs: Not on file   Physical Activity: Not on file   Stress: Not on file   Social Connections: Not on file  "  Intimate Partner Violence: Not on file   Housing Stability: Not on file       Objective:     Vitals: BP (!) 150/82 (BP Location: Right arm, Patient Position: Sitting, BP Cuff Size: Adult)   Pulse 74   Temp 36.4 °C (97.6 °F) (Temporal)   Ht 1.651 m (5' 5\")   Wt 89.4 kg (197 lb)   SpO2 96%   BMI 32.78 kg/m²    General: Alert, pleasant, NAD  HEENT: Normocephalic.  Neck supple.   Respiratory: no distress, no audible wheezing, RR -WNL  Skin: Warm, dry, no rashes.  Extremities: No leg edema. No discoloration  Neurological: No tremors  Psych:  Affect/mood is normal, judgement is good, memory is intact, grooming is appropriate.    Assessment/Plan:     Alis was seen today for congestion.    Diagnoses and all orders for this visit:    Rhinitis, unspecified type  Symptoms improving.   Continue OTC products.    Type 2 diabetes mellitus without complication, without long-term current use of insulin (HCC)  Chronic. Has been resistant to starting on medications as she was trying very hard to work on lifestyle changes. Has lost weight since last OV, using a trial of freestyle twila along with a nutritionist. Restarted once a day Metformin. Will update labs. Consider increasing Metformin dose. Will complete monofilament at next OV as time did not allow on today's visit.  -     MICROALBUMIN CREAT RATIO URINE; Future  -     HEMOGLOBIN A1C; Future  -     Basic Metabolic Panel; Future    Dyslipidemia  Not interested in statin therapy. LDL elevated. Update labs.   -     Basic Metabolic Panel; Future  -     Lipid Profile; Future  -     TSH WITH REFLEX TO FT4; Future    Essential hypertension  Chronic. Elevated today in clinic. Continue current regimen. Consider increasing. DASH diet. Update labs.    Other specified hypothyroidism  Chronic. Compliant with levothyroxine. Due for labs.     Vitamin D insufficiency  -     VITAMIN D,25 HYDROXY (DEFICIENCY); Future      Return for Lab results.        Comfort ROSAS"

## 2022-11-18 ENCOUNTER — HOSPITAL ENCOUNTER (OUTPATIENT)
Dept: LAB | Facility: MEDICAL CENTER | Age: 52
End: 2022-11-18
Attending: NURSE PRACTITIONER
Payer: COMMERCIAL

## 2022-11-18 DIAGNOSIS — E55.9 VITAMIN D INSUFFICIENCY: Chronic | ICD-10-CM

## 2022-11-18 DIAGNOSIS — E11.9 TYPE 2 DIABETES MELLITUS WITHOUT COMPLICATION, WITHOUT LONG-TERM CURRENT USE OF INSULIN (HCC): ICD-10-CM

## 2022-11-18 DIAGNOSIS — E78.5 DYSLIPIDEMIA: ICD-10-CM

## 2022-11-18 LAB
25(OH)D3 SERPL-MCNC: 26 NG/ML (ref 30–100)
ANION GAP SERPL CALC-SCNC: 11 MMOL/L (ref 7–16)
BUN SERPL-MCNC: 15 MG/DL (ref 8–22)
CALCIUM SERPL-MCNC: 9.2 MG/DL (ref 8.5–10.5)
CHLORIDE SERPL-SCNC: 103 MMOL/L (ref 96–112)
CHOLEST SERPL-MCNC: 191 MG/DL (ref 100–199)
CO2 SERPL-SCNC: 23 MMOL/L (ref 20–33)
CREAT SERPL-MCNC: 0.57 MG/DL (ref 0.5–1.4)
CREAT UR-MCNC: 177.26 MG/DL
EST. AVERAGE GLUCOSE BLD GHB EST-MCNC: 174 MG/DL
FASTING STATUS PATIENT QL REPORTED: NORMAL
GFR SERPLBLD CREATININE-BSD FMLA CKD-EPI: 108 ML/MIN/1.73 M 2
GLUCOSE SERPL-MCNC: 148 MG/DL (ref 65–99)
HBA1C MFR BLD: 7.7 % (ref 4–5.6)
HDLC SERPL-MCNC: 28 MG/DL
LDLC SERPL CALC-MCNC: 145 MG/DL
MICROALBUMIN UR-MCNC: <1.2 MG/DL
MICROALBUMIN/CREAT UR: NORMAL MG/G (ref 0–30)
POTASSIUM SERPL-SCNC: 4 MMOL/L (ref 3.6–5.5)
SODIUM SERPL-SCNC: 137 MMOL/L (ref 135–145)
TRIGL SERPL-MCNC: 89 MG/DL (ref 0–149)
TSH SERPL DL<=0.005 MIU/L-ACNC: 0.83 UIU/ML (ref 0.38–5.33)

## 2022-11-18 PROCEDURE — 82043 UR ALBUMIN QUANTITATIVE: CPT

## 2022-11-18 PROCEDURE — 84443 ASSAY THYROID STIM HORMONE: CPT

## 2022-11-18 PROCEDURE — 82570 ASSAY OF URINE CREATININE: CPT

## 2022-11-18 PROCEDURE — 83036 HEMOGLOBIN GLYCOSYLATED A1C: CPT

## 2022-11-18 PROCEDURE — 82306 VITAMIN D 25 HYDROXY: CPT

## 2022-11-18 PROCEDURE — 80048 BASIC METABOLIC PNL TOTAL CA: CPT

## 2022-11-18 PROCEDURE — 80061 LIPID PANEL: CPT

## 2022-11-18 PROCEDURE — 36415 COLL VENOUS BLD VENIPUNCTURE: CPT

## 2022-12-29 ENCOUNTER — HOSPITAL ENCOUNTER (OUTPATIENT)
Dept: RADIOLOGY | Facility: MEDICAL CENTER | Age: 52
End: 2022-12-29
Attending: NURSE PRACTITIONER
Payer: COMMERCIAL

## 2022-12-29 DIAGNOSIS — N95.0 POSTMENOPAUSAL BLEEDING: ICD-10-CM

## 2022-12-29 PROCEDURE — 76856 US EXAM PELVIC COMPLETE: CPT

## 2023-01-02 DIAGNOSIS — G47.00 INSOMNIA, UNSPECIFIED TYPE: ICD-10-CM

## 2023-01-03 RX ORDER — ZOLPIDEM TARTRATE 5 MG/1
TABLET ORAL
Qty: 30 TABLET | Refills: 3 | Status: SHIPPED | OUTPATIENT
Start: 2023-01-03 | End: 2023-05-05

## 2023-02-06 DIAGNOSIS — E03.8 OTHER SPECIFIED HYPOTHYROIDISM: ICD-10-CM

## 2023-02-07 RX ORDER — LEVOTHYROXINE SODIUM 0.07 MG/1
TABLET ORAL
Qty: 90 TABLET | Refills: 3 | Status: SHIPPED | OUTPATIENT
Start: 2023-02-07 | End: 2023-05-22 | Stop reason: SDUPTHER

## 2023-02-21 DIAGNOSIS — E11.9 TYPE 2 DIABETES MELLITUS WITHOUT COMPLICATION, WITHOUT LONG-TERM CURRENT USE OF INSULIN (HCC): ICD-10-CM

## 2023-03-03 RX ORDER — LISINOPRIL 20 MG/1
20 TABLET ORAL DAILY
Qty: 90 TABLET | Refills: 2 | Status: SHIPPED | OUTPATIENT
Start: 2023-03-03 | End: 2023-08-07 | Stop reason: SDUPTHER

## 2023-03-15 ENCOUNTER — HOSPITAL ENCOUNTER (OUTPATIENT)
Dept: LAB | Facility: MEDICAL CENTER | Age: 53
End: 2023-03-15
Attending: NURSE PRACTITIONER
Payer: COMMERCIAL

## 2023-03-15 DIAGNOSIS — E11.9 TYPE 2 DIABETES MELLITUS WITHOUT COMPLICATION, WITHOUT LONG-TERM CURRENT USE OF INSULIN (HCC): ICD-10-CM

## 2023-03-15 LAB
EST. AVERAGE GLUCOSE BLD GHB EST-MCNC: 157 MG/DL
HBA1C MFR BLD: 7.1 % (ref 4–5.6)

## 2023-03-15 PROCEDURE — 36415 COLL VENOUS BLD VENIPUNCTURE: CPT

## 2023-03-15 PROCEDURE — 83036 HEMOGLOBIN GLYCOSYLATED A1C: CPT

## 2023-05-02 DIAGNOSIS — G47.00 INSOMNIA, UNSPECIFIED TYPE: ICD-10-CM

## 2023-05-05 RX ORDER — ZOLPIDEM TARTRATE 5 MG/1
TABLET ORAL
Qty: 30 TABLET | Refills: 0 | Status: SHIPPED | OUTPATIENT
Start: 2023-05-05 | End: 2023-08-08

## 2023-05-07 ENCOUNTER — OFFICE VISIT (OUTPATIENT)
Dept: URGENT CARE | Facility: PHYSICIAN GROUP | Age: 53
End: 2023-05-07
Payer: COMMERCIAL

## 2023-05-07 VITALS
HEIGHT: 65 IN | WEIGHT: 198.19 LBS | BODY MASS INDEX: 33.02 KG/M2 | TEMPERATURE: 98.6 F | OXYGEN SATURATION: 95 % | SYSTOLIC BLOOD PRESSURE: 124 MMHG | RESPIRATION RATE: 14 BRPM | HEART RATE: 70 BPM | DIASTOLIC BLOOD PRESSURE: 68 MMHG

## 2023-05-07 DIAGNOSIS — M54.31 SCIATIC PAIN, RIGHT: ICD-10-CM

## 2023-05-07 PROCEDURE — 99213 OFFICE O/P EST LOW 20 MIN: CPT

## 2023-05-07 RX ORDER — MELOXICAM 15 MG/1
15 TABLET ORAL DAILY
Qty: 10 TABLET | Refills: 0 | Status: SHIPPED | OUTPATIENT
Start: 2023-05-07 | End: 2023-05-17

## 2023-05-07 RX ORDER — LIDOCAINE 4 G/G
1 PATCH TOPICAL PRN
Qty: 5 PATCH | Refills: 0 | Status: CANCELLED | OUTPATIENT
Start: 2023-05-07

## 2023-05-07 RX ORDER — LIDOCAINE 4 G/G
1 PATCH TOPICAL
Qty: 5 PATCH | Refills: 0 | Status: SHIPPED | OUTPATIENT
Start: 2023-05-07 | End: 2023-05-12

## 2023-05-07 ASSESSMENT — ENCOUNTER SYMPTOMS
MYALGIAS: 0
BACK PAIN: 1
NECK PAIN: 0
FEVER: 0
FALLS: 0

## 2023-05-07 ASSESSMENT — FIBROSIS 4 INDEX: FIB4 SCORE: 0.48

## 2023-05-07 NOTE — LETTER
May 7, 2023    To Whom It May Concern:         This is confirmation that Alis Gonzalez attended her scheduled appointment with RALPH Blackwood on 5/07/23. Please excuse her from work on 5/7/23 due to an acute injury.         If you have any questions please do not hesitate to call me at the phone number listed below.    Sincerely,          Katya Oseguera A.P.R.N.  [phone]

## 2023-05-07 NOTE — PROGRESS NOTES
"Subjective:     Alis Gonzalez is a 52 y.o. female who presents for Leg Pain (Possible sciatic nerve pain radiates down to her foot . States top of foot is getting numb x 5 days )    Patient endorses that she did a cardio piliates class five days ago. She felt a \"pop\" in her SI region of her right lower extremity. Denies trauma to the area.The pain radiates to the right foot, which she describes as constant. The top of her right foot is numb. The pain is keeping her awake at night. She has tried heat, Tylenol, ibuprofen with no relief. She is able to ambulate without difficulty. Sitting and lying down make the pain worse.     Review of Systems   Constitutional:  Negative for fever.   Genitourinary:  Negative for frequency and urgency.   Musculoskeletal:  Positive for back pain. Negative for falls, joint pain, myalgias and neck pain.        Numbness on the anterior aspect of the right foot   All other systems reviewed and are negative.    PMH:   Past Medical History:   Diagnosis Date    Alopecia 2015    Asthma     Daytime sleepiness     Fatigue     Insomnia     Thyroid disease      ALLERGIES:   Allergies   Allergen Reactions    Azithromycin Rash     All over body    Ciprofloxacin Rash     All over body     SURGHX: No past surgical history on file.  SOCHX:   Social History     Socioeconomic History    Marital status: Single   Tobacco Use    Smoking status: Never    Smokeless tobacco: Never   Vaping Use    Vaping Use: Never used   Substance and Sexual Activity    Alcohol use: Not Currently     Comment: rarely    Drug use: No    Sexual activity: Yes     Partners: Male     FH:   Family History   Problem Relation Age of Onset    Thyroid Mother         cancer-40's    Cancer Father         skin-melanoma    Hyperlipidemia Father     Cancer Maternal Aunt         breast to brain    Breast Cancer Maternal Aunt          Objective:   /68 (BP Location: Left arm, Patient Position: Sitting, BP Cuff Size: Adult)   Pulse 70   " "Temp 37 °C (98.6 °F) (Temporal)   Resp 14   Ht 1.651 m (5' 5\")   Wt 89.9 kg (198 lb 3.1 oz)   SpO2 95%   BMI 32.98 kg/m²     Physical Exam  Vitals reviewed.   Constitutional:       General: She is not in acute distress.     Appearance: Normal appearance. She is not ill-appearing.   HENT:      Head: Normocephalic and atraumatic.      Right Ear: External ear normal.      Left Ear: External ear normal.      Nose: Nose normal.      Mouth/Throat:      Mouth: Mucous membranes are moist.   Eyes:      Extraocular Movements: Extraocular movements intact.      Conjunctiva/sclera: Conjunctivae normal.      Pupils: Pupils are equal, round, and reactive to light.   Cardiovascular:      Rate and Rhythm: Normal rate and regular rhythm.   Pulmonary:      Effort: Pulmonary effort is normal.   Musculoskeletal:         General: Normal range of motion.      Cervical back: Normal range of motion.      Right foot: No tenderness or bony tenderness.        Legs:       Comments: TTP over right gluteal area consistent with the location of the sciatic nerve.  No bony tenderness noted on spine.  No overlying rash, erythema, bruising.     Right anterior foot -nontender to palpation.,  Full ROM, no swelling, deformity, erythema.  2+ pedal pulses.  No neurovascular compromise.  Capillary refill greater than 2 seconds in right toes.       Skin:     General: Skin is warm and dry.   Neurological:      Mental Status: She is alert.   Psychiatric:         Mood and Affect: Mood normal.         Behavior: Behavior normal.         Thought Content: Thought content normal.       Assessment/Plan:   Assessment      1. Sciatic pain, right  - meloxicam (MOBIC) 15 MG tablet; Take 1 Tablet by mouth every day for 10 days.  Dispense: 10 Tablet; Refill: 0  - Lidocaine 4 % Patch; Apply 1 Application. topically 1 time a day as needed (Sciatic pain) for up to 5 days.  Dispense: 5 Patch; Refill: 0     Based on patient's presenting symptoms, mechanism of injury, " symptom duration, and physical exam findings, likely etiology of the patient's pain is due to sciatic nerve impingement.  She does not report any back pain, no bony tenderness or paraspinal tenderness, full range of motion in the right extremity and right foot, denies loss of bladder or bowel.  Discussed symptom management for sciatic nerve pain.  Prescription for meloxicam and lidocaine patch sent to patient's preferred pharmacy.  Encouraged range of motion exercises for sciatic nerve discomfort.  She will follow-up with her primary care provider. All questions answered. Patient verbalized understanding and is in agreement with this plan of care.     If symptoms are worsening or not improving in 3-5 days, follow-up with PCP or return to UC. Differential diagnosis, natural history, and supportive care discussed. AVS handout given and reviewed with patient. Patient educated on red flags and when to seek treatment back in ED or UC.     I personally reviewed prior external notes and test results pertinent to today's visit.  I have independently reviewed and interpreted all diagnostics ordered during this urgent care visit.     This dictation has been created using voice recognition software. The accuracy of the dictation is limited by the abilities of the software. I expect there may be some errors of grammar and possibly content. I made every attempt to manually correct the errors within my dictation. However, errors related to voice recognition software may still exist and should be interpreted within the appropriate context.    This note was electronically signed by RALPH Rosales

## 2023-05-09 ENCOUNTER — HOSPITAL ENCOUNTER (OUTPATIENT)
Dept: RADIOLOGY | Facility: MEDICAL CENTER | Age: 53
End: 2023-05-09
Attending: NURSE PRACTITIONER
Payer: COMMERCIAL

## 2023-05-09 DIAGNOSIS — Z12.31 VISIT FOR SCREENING MAMMOGRAM: ICD-10-CM

## 2023-05-09 PROCEDURE — 77063 BREAST TOMOSYNTHESIS BI: CPT

## 2023-06-06 ENCOUNTER — OFFICE VISIT (OUTPATIENT)
Dept: MEDICAL GROUP | Facility: MEDICAL CENTER | Age: 53
End: 2023-06-06
Payer: COMMERCIAL

## 2023-06-06 VITALS
BODY MASS INDEX: 32.65 KG/M2 | SYSTOLIC BLOOD PRESSURE: 146 MMHG | HEIGHT: 65 IN | HEART RATE: 84 BPM | WEIGHT: 196 LBS | TEMPERATURE: 97.5 F | DIASTOLIC BLOOD PRESSURE: 82 MMHG | OXYGEN SATURATION: 94 %

## 2023-06-06 DIAGNOSIS — E78.5 DYSLIPIDEMIA: ICD-10-CM

## 2023-06-06 DIAGNOSIS — E03.8 OTHER SPECIFIED HYPOTHYROIDISM: ICD-10-CM

## 2023-06-06 DIAGNOSIS — Z91.89 OTHER SPECIFIED PERSONAL RISK FACTORS, NOT ELSEWHERE CLASSIFIED: ICD-10-CM

## 2023-06-06 DIAGNOSIS — E11.9 TYPE 2 DIABETES MELLITUS WITHOUT COMPLICATION, WITHOUT LONG-TERM CURRENT USE OF INSULIN (HCC): ICD-10-CM

## 2023-06-06 DIAGNOSIS — R20.0 NUMBNESS: ICD-10-CM

## 2023-06-06 DIAGNOSIS — R23.4 SKIN FISSURE: ICD-10-CM

## 2023-06-06 DIAGNOSIS — I10 ESSENTIAL HYPERTENSION: Chronic | ICD-10-CM

## 2023-06-06 PROCEDURE — 3079F DIAST BP 80-89 MM HG: CPT | Performed by: NURSE PRACTITIONER

## 2023-06-06 PROCEDURE — 92250 FUNDUS PHOTOGRAPHY W/I&R: CPT | Mod: TC | Performed by: NURSE PRACTITIONER

## 2023-06-06 PROCEDURE — 99214 OFFICE O/P EST MOD 30 MIN: CPT | Performed by: NURSE PRACTITIONER

## 2023-06-06 PROCEDURE — 3077F SYST BP >= 140 MM HG: CPT | Performed by: NURSE PRACTITIONER

## 2023-06-06 RX ORDER — BLOOD-GLUCOSE SENSOR
EACH MISCELLANEOUS
Qty: 6 EACH | Refills: 3 | Status: SHIPPED | OUTPATIENT
Start: 2023-06-06 | End: 2023-11-13

## 2023-06-06 ASSESSMENT — PATIENT HEALTH QUESTIONNAIRE - PHQ9: CLINICAL INTERPRETATION OF PHQ2 SCORE: 0

## 2023-06-06 ASSESSMENT — FIBROSIS 4 INDEX: FIB4 SCORE: 0.48

## 2023-06-06 NOTE — PROGRESS NOTES
Chief Complaint   Patient presents with    Annual Exam     Preventative       Subjective:     HPI:     Alis Gonzalez is a 52 y.o. female here to discuss the following:    Saw [facility] a few weeks ago for R sided sciatica.   Has numbness on her right top of her foot and traveling up to her right lateral leg. No pain. No tingling.  The right lower back/buttock pain has improved.  Currently not doing any stretching.  No loss of bowel or bladder, saddle anesthesia.    She is wanting to establish with a dermatologist to further discuss her alopecia.  She does have some hair growth however would like to further discuss possible treatment options.    Currently not taking any medications for her diabetes.  Last A1c 7.1%.  She does have some hesitancy with taking any medications at this time.  We had a long discussion regarding this.      Would like to update blood work.    ROS: : see above        Current Outpatient Medications:     Continuous Blood Gluc Sensor (FREESTYLE ALTAF 3 SENSOR) Misc, Apply 1 sensor under skin every 14 days to check blood sugars twice daily or as needed for symptoms of high or low blood sugar., Disp: 6 Each, Rfl: 3    Continuous Blood Gluc  (FREESTYLE ALTAF 2 READER) Device, Use to check blood sugars twice daily and as needed for signs and symptoms of high or low blood sugar., Disp: 1 Each, Rfl: 0    levothyroxine (SYNTHROID) 75 MCG Tab, Take 1 Tablet by mouth every morning on an empty stomach., Disp: 90 Tablet, Rfl: 1    lisinopril (PRINIVIL) 20 MG Tab, Take 1 Tablet by mouth every day., Disp: 90 Tablet, Rfl: 2    albuterol (ACCUNEB) 0.63 MG/3ML nebulizer solution, Take 3 mL by nebulization every four hours as needed for Shortness of Breath., Disp: 3 mL, Rfl: 1    Alcohol Swabs, Wipe site with prep pad prior to injection., Disp: 100 Each, Rfl: 2    DUPIXENT 300 MG/2ML Solution Prefilled Syringe injection, , Disp: , Rfl:     Allergies   Allergen Reactions    Azithromycin Rash     All over body  "   Ciprofloxacin Rash     All over body       Objective:     Vitals: BP (!) 146/82 (BP Location: Left arm, Patient Position: Sitting, BP Cuff Size: Adult)   Pulse 84   Temp 36.4 °C (97.5 °F) (Temporal)   Ht 1.651 m (5' 5\")   Wt 88.9 kg (196 lb)   SpO2 94%   BMI 32.62 kg/m²    General: Alert, pleasant, NAD  HEENT: Normocephalic.  Neck supple.   Respiratory: no distress, no audible wheezing, RR -WNL  Skin: Warm, dry, cracked, fissures on feet.  No signs and symptoms of infection.  Extremities: No leg edema. No discoloration  Neurological: No tremors  Psych:  Affect/mood is normal, judgement is good, memory is intact, grooming is appropriate.    Assessment/Plan:      1. Type 2 diabetes mellitus without complication, without long-term current use of insulin (HCC)  Chronic.  A1c 7.1%.  Continues to remain hesitant to starting on any medications at this time.  Has tried metformin in the past.  She would like to continue working on lifestyle modification, dietary changes.  She would like to work on monitoring her blood sugars and found having the freestyle altaf very helpful for this in the past.  Would like to trial wearing the freestyle altaf device again.  We have discussed that it may or may not be covered by insurance however we will try to submit.  We also consider GLP-1 medications-she will do some research on this.  - Diabetic Monofilament LE Exam  - Comp Metabolic Panel; Future  - HEMOGLOBIN A1C; Future  - TSH; Future  - Lipid Profile; Future  - POCT Retinal Eye Exam  - Continuous Blood Gluc Sensor (FREESTYLE ALTAF 3 SENSOR) Misc; Apply 1 sensor under skin every 14 days to check blood sugars twice daily or as needed for symptoms of high or low blood sugar.  Dispense: 6 Each; Refill: 3  - Continuous Blood Gluc  (FREESTYLE ALTAF 2 READER) Device; Use to check blood sugars twice daily and as needed for signs and symptoms of high or low blood sugar.  Dispense: 1 Each; Refill: 0    2. Other specified " hypothyroidism  Chronic, stable.  Update TSH.  Continue levothyroxine 75 mcg.  - TSH; Future    3. Dyslipidemia  Chronic, currently not on any treatment for this per personal choice.  Recommend obtaining coronary artery calcium scan as this may be helpful in further evaluating CV risk.  - Lipid Profile; Future  - CT-CARDIAC SCORING; Future    4. Other specified personal risk factors, not elsewhere classified  - CT-CARDIAC SCORING; Future    5. Skin fissure  Present on physical exam of her feet.  We have discussed using coconut oil and gently exfoliating skin.  Caution regarding monitoring for signs and symptoms of infection.    7. Essential hypertension  Chronic.  Blood pressure mildly elevated in clinic today, asymptomatic.   Consider increasing lisinopril to 40 mg.  We did not have a chance to recheck her blood pressure prior to her leaving the clinic today.  Continue Dash diet, increase physical activity.    8.  Numbness  Acute, in the setting of recent exacerbation of sciatica symptoms.  Numbness seems to follow along the L5-S1 dermatome.  No pain, tingling.  Strength is very mildly reduced compared to left foot.  ER precautions.  Recommend working on lower back and leg stretching at this time.  If symptoms persist or worsen as we have discussed she will follow-up in the clinic and we can consider imaging.    Return in about 6 months (around 12/6/2023).    {I have placed the above orders and discussed them with an approved delegating provider. The MA is performing the below orders under the direction of Dr. Karly ROSAS

## 2023-06-06 NOTE — LETTER
June 13, 2023         Alis Concepcion Held  [address] [place] [address]        Dear Alis:      Attached are the results from your recent visit:    The test results show that the retinal exam does not show any evidence of diabetic retinopathy.Recommend repeating annually.    If you have any questions or concerns, please don't hesitate to call.      Sincerely,        ELIS Burt.  Electronically Signed

## 2023-06-07 ENCOUNTER — HOSPITAL ENCOUNTER (OUTPATIENT)
Dept: RADIOLOGY | Facility: MEDICAL CENTER | Age: 53
End: 2023-06-07
Attending: NURSE PRACTITIONER
Payer: COMMERCIAL

## 2023-06-07 DIAGNOSIS — E78.5 DYSLIPIDEMIA: ICD-10-CM

## 2023-06-07 DIAGNOSIS — Z91.89 OTHER SPECIFIED PERSONAL RISK FACTORS, NOT ELSEWHERE CLASSIFIED: ICD-10-CM

## 2023-06-07 PROCEDURE — 4410556 CT-CARDIAC SCORING (SELF PAY ONLY)

## 2023-06-15 ENCOUNTER — HOSPITAL ENCOUNTER (OUTPATIENT)
Facility: MEDICAL CENTER | Age: 53
End: 2023-06-15
Attending: STUDENT IN AN ORGANIZED HEALTH CARE EDUCATION/TRAINING PROGRAM
Payer: COMMERCIAL

## 2023-06-15 ENCOUNTER — OFFICE VISIT (OUTPATIENT)
Dept: URGENT CARE | Facility: PHYSICIAN GROUP | Age: 53
End: 2023-06-15
Payer: COMMERCIAL

## 2023-06-15 VITALS
TEMPERATURE: 98.6 F | OXYGEN SATURATION: 95 % | HEART RATE: 102 BPM | BODY MASS INDEX: 32.49 KG/M2 | SYSTOLIC BLOOD PRESSURE: 146 MMHG | WEIGHT: 195 LBS | RESPIRATION RATE: 16 BRPM | HEIGHT: 65 IN | DIASTOLIC BLOOD PRESSURE: 78 MMHG

## 2023-06-15 DIAGNOSIS — J03.90 EXUDATIVE TONSILLITIS: ICD-10-CM

## 2023-06-15 LAB — S PYO DNA SPEC NAA+PROBE: NOT DETECTED

## 2023-06-15 PROCEDURE — 87070 CULTURE OTHR SPECIMN AEROBIC: CPT

## 2023-06-15 PROCEDURE — 3078F DIAST BP <80 MM HG: CPT | Performed by: STUDENT IN AN ORGANIZED HEALTH CARE EDUCATION/TRAINING PROGRAM

## 2023-06-15 PROCEDURE — 3077F SYST BP >= 140 MM HG: CPT | Performed by: STUDENT IN AN ORGANIZED HEALTH CARE EDUCATION/TRAINING PROGRAM

## 2023-06-15 PROCEDURE — 87077 CULTURE AEROBIC IDENTIFY: CPT

## 2023-06-15 PROCEDURE — 99213 OFFICE O/P EST LOW 20 MIN: CPT | Performed by: STUDENT IN AN ORGANIZED HEALTH CARE EDUCATION/TRAINING PROGRAM

## 2023-06-15 PROCEDURE — 87651 STREP A DNA AMP PROBE: CPT | Performed by: STUDENT IN AN ORGANIZED HEALTH CARE EDUCATION/TRAINING PROGRAM

## 2023-06-15 RX ORDER — CLINDAMYCIN HYDROCHLORIDE 300 MG/1
300 CAPSULE ORAL 3 TIMES DAILY
Qty: 30 CAPSULE | Refills: 0 | Status: SHIPPED | OUTPATIENT
Start: 2023-06-15 | End: 2023-06-25

## 2023-06-15 ASSESSMENT — FIBROSIS 4 INDEX: FIB4 SCORE: 0.48

## 2023-06-15 NOTE — PROGRESS NOTES
"Subjective:   Alis Gonzalez is a 52 y.o. female who presents for Sore Throat (X 3 days)      HPI:  Pleasant 52-year-old female presents the urgent care for 3 days of sore throat.  She reports seeing white on her tonsils and swelling.  No recent sick contacts or recent travel.  She is able to maintain adequate oral intake fluids and solids.  Using ibuprofen/Tylenol at home.  She reports having strep approximately 6 months ago.  States that it felt similar to this.  Denies fever, chills, nausea, vomiting, abdominal pain, dizziness, headache, cough, shortness of breath, wheezing, ear pain, ear discharge, chest pain, nasal congestion, or runny nose.    Medications:    albuterol  Alcohol Swabs  clindamycin Caps  Dupixent Sosy  FreeStyle Cora 2 [place] Gemma  FreeStyle Cora 3 Sensor Misc  levothyroxine Tabs  lisinopril Tabs    Allergies: Azithromycin and Ciprofloxacin    Problem List: Alis Gonzalez does not have any pertinent problems on file.    Surgical History:  No past surgical history on file.    Past Social Hx: Alis Gonzalez  reports that she has never smoked. She has never used smokeless tobacco. She reports that she does not currently use alcohol. She reports that she does not use drugs.     Past Family Hx:  Alis Gonzalez family history includes Breast Cancer in her maternal aunt; Cancer in her father and maternal aunt; Hyperlipidemia in her father; Thyroid in her mother.     Problem list, medications, and allergies reviewed by myself today in Epic.     Objective:     BP (!) 146/78 (BP Location: Right arm, Patient Position: Sitting, BP Cuff Size: Large adult)   Pulse (!) 102   Temp 37 °C (98.6 °F) (Temporal)   Resp 16   Ht 1.651 m (5' 5\")   Wt 88.5 kg (195 lb)   SpO2 95%   BMI 32.45 kg/m²     Physical Exam  Vitals reviewed.   Constitutional:       General: She is not in acute distress.     Appearance: Normal appearance.   HENT:      Head: Normocephalic.      Right Ear: Tympanic membrane, ear canal " and external ear normal.      Left Ear: Tympanic membrane, ear canal and external ear normal.      Nose: Nose normal.      Mouth/Throat:      Mouth: Mucous membranes are moist.      Pharynx: Uvula midline. Posterior oropharyngeal erythema present. No oropharyngeal exudate.      Tonsils: Tonsillar exudate present. No tonsillar abscesses. 2+ on the right. 2+ on the left.   Eyes:      Conjunctiva/sclera: Conjunctivae normal.      Pupils: Pupils are equal, round, and reactive to light.   Cardiovascular:      Rate and Rhythm: Normal rate and regular rhythm.      Pulses: Normal pulses.      Heart sounds: Normal heart sounds. No murmur heard.  Pulmonary:      Effort: Pulmonary effort is normal. No respiratory distress.      Breath sounds: Normal breath sounds. No stridor. No wheezing, rhonchi or rales.   Musculoskeletal:      Cervical back: Normal range of motion.   Lymphadenopathy:      Cervical: Cervical adenopathy present.      Right cervical: Superficial cervical adenopathy present.      Left cervical: Superficial cervical adenopathy present.   Skin:     Findings: No rash.   Neurological:      Mental Status: She is alert.       Lab Results/POC Test Results   Results for orders placed or performed in visit on 06/15/23   POCT GROUP A STREP, PCR   Result Value Ref Range    POC Group A Strep, PCR Not Detected Not Detected, Invalid             Assessment/Plan:     Diagnosis and associated orders:     1. Exudative tonsillitis  POCT GROUP A STREP, PCR    CULTURE THROAT    clindamycin (CLEOCIN) 300 MG Cap    CANCELED: POCT Rapid Strep A         Comments/MDM:     PCR group A strep negative.  Patient's presentation physical and findings are consistent with bacterial etiology.  Throat culture conducted for further evaluation which the patient is agreeable to.  [place] decision making was made and the patient was started on clindamycin for suspected bacterial process.  She does report a history of failure with amoxicillin to treat  strep in the past.  She would like to pursue clindamycin despite this not being first-line treatment.  Patient be contacted with any positive result on throat culture requires a change in treatment.  If throat culture is negative we may discontinue treatment at that time.  We did discuss mono which would be unlikely.  She denies any recent exposure to anyone with potential mono.  We did discuss testing in clinic but the patient declined this.  Vitals are stable.  Patient is well-appearing nontoxic.  No peritonsillar abscess.  ED/return precautions given.         Differential diagnosis, natural history, supportive care, and indications for immediate follow-up discussed.    Advised the patient to follow-up with the primary care physician for recheck, reevaluation, and consideration of further management.    Please note that this dictation was created using voice recognition software. I have made a reasonable attempt to correct obvious errors, but I expect that there are errors of grammar and possibly content that I did not discover before finalizing the note.    Electronically signed by Michel Schwarz PA-C.

## 2023-06-18 LAB
BACTERIA SPEC RESP CULT: NORMAL
SIGNIFICANT IND 70042: NORMAL
SITE SITE: NORMAL
SOURCE SOURCE: NORMAL

## 2023-07-23 ENCOUNTER — HOSPITAL ENCOUNTER (OUTPATIENT)
Dept: RADIOLOGY | Facility: MEDICAL CENTER | Age: 53
End: 2023-07-23
Attending: FAMILY MEDICINE
Payer: COMMERCIAL

## 2023-07-23 ENCOUNTER — OFFICE VISIT (OUTPATIENT)
Dept: URGENT CARE | Facility: PHYSICIAN GROUP | Age: 53
End: 2023-07-23
Payer: COMMERCIAL

## 2023-07-23 VITALS
DIASTOLIC BLOOD PRESSURE: 82 MMHG | RESPIRATION RATE: 16 BRPM | SYSTOLIC BLOOD PRESSURE: 138 MMHG | HEART RATE: 79 BPM | BODY MASS INDEX: 32.89 KG/M2 | WEIGHT: 197.4 LBS | HEIGHT: 65 IN | TEMPERATURE: 99 F | OXYGEN SATURATION: 94 %

## 2023-07-23 DIAGNOSIS — R10.9 RIGHT FLANK PAIN: ICD-10-CM

## 2023-07-23 LAB
APPEARANCE UR: NORMAL
BILIRUB UR STRIP-MCNC: NEGATIVE MG/DL
COLOR UR AUTO: NORMAL
GLUCOSE UR STRIP.AUTO-MCNC: NEGATIVE MG/DL
KETONES UR STRIP.AUTO-MCNC: NEGATIVE MG/DL
LEUKOCYTE ESTERASE UR QL STRIP.AUTO: NEGATIVE
NITRITE UR QL STRIP.AUTO: NEGATIVE
PH UR STRIP.AUTO: 6 [PH] (ref 5–8)
PROT UR QL STRIP: NEGATIVE MG/DL
RBC UR QL AUTO: NEGATIVE
SP GR UR STRIP.AUTO: 1.02
UROBILINOGEN UR STRIP-MCNC: NORMAL MG/DL

## 2023-07-23 PROCEDURE — 81002 URINALYSIS NONAUTO W/O SCOPE: CPT | Performed by: FAMILY MEDICINE

## 2023-07-23 PROCEDURE — 3075F SYST BP GE 130 - 139MM HG: CPT | Performed by: FAMILY MEDICINE

## 2023-07-23 PROCEDURE — 99214 OFFICE O/P EST MOD 30 MIN: CPT | Performed by: FAMILY MEDICINE

## 2023-07-23 PROCEDURE — 3079F DIAST BP 80-89 MM HG: CPT | Performed by: FAMILY MEDICINE

## 2023-07-23 PROCEDURE — 74176 CT ABD & PELVIS W/O CONTRAST: CPT

## 2023-07-23 RX ORDER — KETOROLAC TROMETHAMINE 30 MG/ML
30 INJECTION, SOLUTION INTRAMUSCULAR; INTRAVENOUS ONCE
Status: COMPLETED | OUTPATIENT
Start: 2023-07-23 | End: 2023-07-23

## 2023-07-23 RX ORDER — MELOXICAM 15 MG/1
15 TABLET ORAL DAILY
Qty: 30 TABLET | Refills: 0 | Status: SHIPPED | OUTPATIENT
Start: 2023-07-23 | End: 2023-08-22

## 2023-07-23 RX ADMIN — KETOROLAC TROMETHAMINE 30 MG: 30 INJECTION, SOLUTION INTRAMUSCULAR; INTRAVENOUS at 10:40

## 2023-07-23 ASSESSMENT — ENCOUNTER SYMPTOMS
WEIGHT LOSS: 0
VOMITING: 0
EYE REDNESS: 0
NAUSEA: 0
MYALGIAS: 0
EYE DISCHARGE: 0

## 2023-07-23 ASSESSMENT — FIBROSIS 4 INDEX: FIB4 SCORE: 0.48

## 2023-07-23 NOTE — PROGRESS NOTES
"Subjective     Alis Gonzalez is a 52 y.o. female who presents with Other (Right side abdominal pain radiated to the front x3 days. Pt denies urination issues such as dysuria, frequency or odor. )            3 days severe right flank pain.  Aching in character.  Waxes and wanes.  Worse with laying on side.  Radiates into the lower abdomen.  No rash.  No trauma or clear trigger.  Minimal relief with 15 mg meloxicam. No other aggravating or alleviating factors.          Review of Systems   Constitutional:  Negative for malaise/fatigue and weight loss.   Eyes:  Negative for discharge and redness.   Gastrointestinal:  Negative for nausea and vomiting.   Musculoskeletal:  Negative for joint pain and myalgias.   Skin:  Negative for itching and rash.              Objective     /82 (BP Location: Right arm, Patient Position: Sitting, BP Cuff Size: Adult)   Pulse 79   Temp 37.2 °C (99 °F) (Temporal)   Resp 16   Ht 1.651 m (5' 5\")   Wt 89.5 kg (197 lb 6.4 oz)   SpO2 94%   BMI 32.85 kg/m²      Physical Exam  Constitutional:       General: She is not in acute distress.     Appearance: She is well-developed.   HENT:      Head: Normocephalic and atraumatic.   Eyes:      Conjunctiva/sclera: Conjunctivae normal.   Cardiovascular:      Rate and Rhythm: Normal rate and regular rhythm.      Heart sounds: Normal heart sounds. No murmur heard.  Pulmonary:      Effort: Pulmonary effort is normal.      Breath sounds: Normal breath sounds. No wheezing.   Musculoskeletal:        Legs:    Skin:     General: Skin is warm and dry.      Findings: No rash.   Neurological:      Mental Status: She is alert.                             Assessment & Plan       UA reviewed    1. Right flank pain  POCT Urinalysis    CT-RENAL COLIC EVALUATION(A/P W/O)    ketorolac (Toradol) injection 30 mg    meloxicam (MOBIC) 15 MG tablet          Unclear etiology.  Likely musculoskeletal versus neurogenic pain.  Discussed possible trial of Neurontin if " needed would like to hold off at this time.  Discussed DDx includes shingles.  She will contact us if rash develops.    F/u primary care

## 2023-08-08 RX ORDER — LISINOPRIL 20 MG/1
20 TABLET ORAL DAILY
Qty: 90 TABLET | Refills: 2 | Status: SHIPPED | OUTPATIENT
Start: 2023-08-08

## 2023-08-09 DIAGNOSIS — L23.9 ALLERGIC ECZEMA: Chronic | ICD-10-CM

## 2023-08-09 DIAGNOSIS — L65.9 ALOPECIA: Chronic | ICD-10-CM

## 2023-11-08 ENCOUNTER — HOSPITAL ENCOUNTER (OUTPATIENT)
Dept: LAB | Facility: MEDICAL CENTER | Age: 53
End: 2023-11-08
Attending: NURSE PRACTITIONER
Payer: COMMERCIAL

## 2023-11-08 DIAGNOSIS — E03.8 OTHER SPECIFIED HYPOTHYROIDISM: ICD-10-CM

## 2023-11-08 DIAGNOSIS — E78.5 DYSLIPIDEMIA: ICD-10-CM

## 2023-11-08 DIAGNOSIS — E11.9 TYPE 2 DIABETES MELLITUS WITHOUT COMPLICATION, WITHOUT LONG-TERM CURRENT USE OF INSULIN (HCC): ICD-10-CM

## 2023-11-08 LAB
ALBUMIN SERPL BCP-MCNC: 4.3 G/DL (ref 3.2–4.9)
ALBUMIN/GLOB SERPL: 1.4 G/DL
ALP SERPL-CCNC: 72 U/L (ref 30–99)
ALT SERPL-CCNC: 29 U/L (ref 2–50)
ANION GAP SERPL CALC-SCNC: 13 MMOL/L (ref 7–16)
AST SERPL-CCNC: 26 U/L (ref 12–45)
BILIRUB SERPL-MCNC: 0.4 MG/DL (ref 0.1–1.5)
BUN SERPL-MCNC: 14 MG/DL (ref 8–22)
CALCIUM ALBUM COR SERPL-MCNC: 8.9 MG/DL (ref 8.5–10.5)
CALCIUM SERPL-MCNC: 9.1 MG/DL (ref 8.5–10.5)
CHLORIDE SERPL-SCNC: 104 MMOL/L (ref 96–112)
CHOLEST SERPL-MCNC: 166 MG/DL (ref 100–199)
CO2 SERPL-SCNC: 23 MMOL/L (ref 20–33)
CREAT SERPL-MCNC: 0.47 MG/DL (ref 0.5–1.4)
EST. AVERAGE GLUCOSE BLD GHB EST-MCNC: 180 MG/DL
FASTING STATUS PATIENT QL REPORTED: NORMAL
GFR SERPLBLD CREATININE-BSD FMLA CKD-EPI: 112 ML/MIN/1.73 M 2
GLOBULIN SER CALC-MCNC: 3 G/DL (ref 1.9–3.5)
GLUCOSE SERPL-MCNC: 156 MG/DL (ref 65–99)
HBA1C MFR BLD: 7.9 % (ref 4–5.6)
HDLC SERPL-MCNC: 33 MG/DL
LDLC SERPL CALC-MCNC: 108 MG/DL
POTASSIUM SERPL-SCNC: 4.3 MMOL/L (ref 3.6–5.5)
PROT SERPL-MCNC: 7.3 G/DL (ref 6–8.2)
SODIUM SERPL-SCNC: 140 MMOL/L (ref 135–145)
TRIGL SERPL-MCNC: 123 MG/DL (ref 0–149)
TSH SERPL DL<=0.005 MIU/L-ACNC: 1.26 UIU/ML (ref 0.38–5.33)

## 2023-11-08 PROCEDURE — 36415 COLL VENOUS BLD VENIPUNCTURE: CPT

## 2023-11-08 PROCEDURE — 80061 LIPID PANEL: CPT

## 2023-11-08 PROCEDURE — 84443 ASSAY THYROID STIM HORMONE: CPT

## 2023-11-08 PROCEDURE — 83036 HEMOGLOBIN GLYCOSYLATED A1C: CPT

## 2023-11-08 PROCEDURE — 80053 COMPREHEN METABOLIC PANEL: CPT

## 2023-11-13 ENCOUNTER — OFFICE VISIT (OUTPATIENT)
Dept: MEDICAL GROUP | Facility: MEDICAL CENTER | Age: 53
End: 2023-11-13
Payer: COMMERCIAL

## 2023-11-13 VITALS
OXYGEN SATURATION: 94 % | RESPIRATION RATE: 16 BRPM | HEIGHT: 65 IN | HEART RATE: 100 BPM | BODY MASS INDEX: 33.82 KG/M2 | TEMPERATURE: 97.6 F | WEIGHT: 203 LBS | SYSTOLIC BLOOD PRESSURE: 124 MMHG | DIASTOLIC BLOOD PRESSURE: 74 MMHG

## 2023-11-13 DIAGNOSIS — E11.9 TYPE 2 DIABETES MELLITUS WITHOUT COMPLICATION, WITHOUT LONG-TERM CURRENT USE OF INSULIN (HCC): ICD-10-CM

## 2023-11-13 DIAGNOSIS — E78.5 DYSLIPIDEMIA: Chronic | ICD-10-CM

## 2023-11-13 DIAGNOSIS — I10 ESSENTIAL HYPERTENSION: Chronic | ICD-10-CM

## 2023-11-13 DIAGNOSIS — G47.00 INSOMNIA, UNSPECIFIED TYPE: ICD-10-CM

## 2023-11-13 DIAGNOSIS — E03.8 OTHER SPECIFIED HYPOTHYROIDISM: Chronic | ICD-10-CM

## 2023-11-13 DIAGNOSIS — F41.9 ANXIETY: ICD-10-CM

## 2023-11-13 PROBLEM — Z80.8 FAMILY HISTORY OF THYROID CANCER: Status: ACTIVE | Noted: 2023-11-13

## 2023-11-13 PROCEDURE — 3078F DIAST BP <80 MM HG: CPT | Performed by: NURSE PRACTITIONER

## 2023-11-13 PROCEDURE — 99214 OFFICE O/P EST MOD 30 MIN: CPT | Performed by: NURSE PRACTITIONER

## 2023-11-13 PROCEDURE — 3074F SYST BP LT 130 MM HG: CPT | Performed by: NURSE PRACTITIONER

## 2023-11-13 ASSESSMENT — FIBROSIS 4 INDEX: FIB4 SCORE: 0.57

## 2023-11-13 NOTE — PROGRESS NOTES
Chief Complaint   Patient presents with    Follow-Up       Subjective:     HPI:     Alis Gonzalez is a 53 y.o. female here to discuss the following:    Presents to discuss recent A1c results.     A1c has increased.  She has been hesitant to start on any medications.   Hx of Metformin trial- GI upset.  She would like to trial GLP1  Has some anxiety about starting on medications and potential SE's    Lab Results   Component Value Date/Time    HBA1C 7.9 (H) 11/08/2023 0846    HBA1C 7.1 (H) 03/15/2023 1035    HBA1C 7.7 (H) 11/18/2022 0827    HBA1C 6.6 (H) 03/07/2022 1558     Dyslipidemia  Recent lipid panel with improved LDL-now at 108  Declines statins.    CAC 6/08/2023:  Coronary calcification:  LMA - 0.0  LCX - 0.0  LAD - 0.0  RCA - 39.3     Total Calcium Score: 39.3    Recent TSH- 1.260    She tells me that she is sleeping better.   Getting about 4-5 hours of sleep.   Has not been using Ambien in the last 2 months.   Finding OTC sleep more helpful.     ROS: : see above        Current Outpatient Medications:     Semaglutide,0.25 or 0.5MG/DOS, 2 MG/1.5ML Solution Pen-injector, Inject 0.25mg under skin every 7 days for 4 weeks, then inject 0.5mg under skin every 7 days thereafter., Disp: 1.5 mL, Rfl: 0    lisinopril (PRINIVIL) 20 MG Tab, Take 1 Tablet by mouth every day., Disp: 90 Tablet, Rfl: 2    levothyroxine (SYNTHROID) 75 MCG Tab, Take 1 Tablet by mouth every morning on an empty stomach., Disp: 90 Tablet, Rfl: 1    albuterol (ACCUNEB) 0.63 MG/3ML nebulizer solution, Take 3 mL by nebulization every four hours as needed for Shortness of Breath., Disp: 3 mL, Rfl: 1    Alcohol Swabs, Wipe site with prep pad prior to injection., Disp: 100 Each, Rfl: 2    DUPIXENT 300 MG/2ML Solution Prefilled Syringe injection, , Disp: , Rfl:     Allergies   Allergen Reactions    Azithromycin Rash     All over body    Ciprofloxacin Rash     All over body       Objective:     Vitals: /74   Pulse 100   Temp 36.4 °C (97.6 °F)    "Resp 16   Ht 1.651 m (5' 5\")   Wt 92.1 kg (203 lb)   SpO2 94%   BMI 33.78 kg/m²    General: Alert, pleasant, NAD  HEENT: Normocephalic.  Neck supple.   Respiratory: no distress, no audible wheezing, RR -WNL  Skin: Warm, dry, no rashes.  Extremities: No leg edema. No discoloration  Neurological: No tremors  Psych:  Affect/mood is normal, judgement is good, memory is intact, grooming is appropriate.    Assessment/Plan:      1. Type 2 diabetes mellitus without complication, without long-term current use of insulin (HCC)  Chronic.  Progressing.  A1c has now increased despite patient's ongoing efforts.  She is amenable to starting on GLP-1 Ozempic.  She has quite a bit of anxiety about starting new medications and potential for side effects and adverse drug response.  We have discussed the common side effects and ADRs.  She will start at 0.25 mg weekly x4 weeks then increase to 0.5 mg weekly thereafter.  She will send me a message via [facility] to inform me how she is tolerating the 0.5 mg dosing.  At that time then I will place a new order for Ozempic 0.5 mg.  Follow back up 3 months recheck A1c clinic.  - Semaglutide,0.25 or 0.5MG/DOS, 2 MG/1.5ML Solution Pen-injector; Inject 0.25mg under skin every 7 days for 4 weeks, then inject 0.5mg under skin every 7 days thereafter.  Dispense: 1.5 mL; Refill: 0    2. Dyslipidemia  Chronic.  Improving.  Currently not on statin therapy.  Continue lifestyle modification.     3. Essential hypertension  Chronic.  Stable.  Continue lisinopril.      4. Other specified hypothyroidism  Chronic stable. Continue levothyroxine at 75 mcg.    5. Anxiety  Chronic.  Continue to encourage patient to work on nonpharmacological methods of reducing anxiety.    6. Insomnia, unspecified type  Chronic, stable although patient is getting about 4 to 5 hours of sleep each night which is an improvement for her.  Using OTC sleep aid.  Has not had to use Ambien in quite some time.    Return in about 3 " months (around 2/13/2024) for recheck A1c.          Comfort GILLIS.

## 2023-11-20 ENCOUNTER — TELEPHONE (OUTPATIENT)
Dept: MEDICAL GROUP | Facility: MEDICAL CENTER | Age: 53
End: 2023-11-20
Payer: COMMERCIAL

## 2023-11-20 NOTE — TELEPHONE ENCOUNTER
FINAL PRIOR AUTHORIZATION STATUS:    1.  Name of Medication & Dose: Ozempic 0.25-0.5 mg      2. Prior Auth Status: Approved through 11/16/2024     3. Action Taken: Pharmacy Notified: no Patient Notified: yes

## 2023-12-15 DIAGNOSIS — E11.9 TYPE 2 DIABETES MELLITUS WITHOUT COMPLICATION, WITHOUT LONG-TERM CURRENT USE OF INSULIN (HCC): ICD-10-CM

## 2023-12-19 RX ORDER — SEMAGLUTIDE 0.68 MG/ML
INJECTION, SOLUTION SUBCUTANEOUS
Qty: 3 EACH | Refills: 1 | Status: SHIPPED | OUTPATIENT
Start: 2023-12-19 | End: 2024-03-08 | Stop reason: SDUPTHER

## 2024-02-18 ENCOUNTER — OFFICE VISIT (OUTPATIENT)
Dept: URGENT CARE | Facility: PHYSICIAN GROUP | Age: 54
End: 2024-02-18
Payer: COMMERCIAL

## 2024-02-18 VITALS
TEMPERATURE: 98.2 F | SYSTOLIC BLOOD PRESSURE: 124 MMHG | BODY MASS INDEX: 33.9 KG/M2 | HEIGHT: 65 IN | RESPIRATION RATE: 16 BRPM | OXYGEN SATURATION: 94 % | WEIGHT: 203.48 LBS | HEART RATE: 78 BPM | DIASTOLIC BLOOD PRESSURE: 70 MMHG

## 2024-02-18 DIAGNOSIS — I88.9 LYMPHADENITIS: ICD-10-CM

## 2024-02-18 PROCEDURE — 3078F DIAST BP <80 MM HG: CPT | Performed by: FAMILY MEDICINE

## 2024-02-18 PROCEDURE — 3074F SYST BP LT 130 MM HG: CPT | Performed by: FAMILY MEDICINE

## 2024-02-18 PROCEDURE — 99213 OFFICE O/P EST LOW 20 MIN: CPT | Performed by: FAMILY MEDICINE

## 2024-02-18 RX ORDER — TRIAMCINOLONE ACETONIDE 0.25 MG/G
OINTMENT TOPICAL
COMMUNITY
Start: 2023-12-22

## 2024-02-18 RX ORDER — PREDNISONE 20 MG/1
40 TABLET ORAL DAILY
Qty: 10 TABLET | Refills: 0 | Status: SHIPPED | OUTPATIENT
Start: 2024-02-18 | End: 2024-02-23

## 2024-02-18 ASSESSMENT — ENCOUNTER SYMPTOMS
SORE THROAT: 0
FEVER: 0

## 2024-02-18 ASSESSMENT — FIBROSIS 4 INDEX: FIB4 SCORE: 0.57

## 2024-02-18 NOTE — PROGRESS NOTES
Subjective:     Alis Gonzalez is a 53 y.o. female who presents for Neck Pain (Neck pain and inflamed lymph nodes on RT side. Had a cold-like sx ~1.5 weeks, has almost fully resolved w/ some ongoing slight nasal drainage. Pain started shortly after getting sick, sx have been going for 4 days now. Has been taking tylenol and Advil w/ temporary relief. )    HPI  Pt presents for evaluation of an acute problem  Pt with an illness the past 10 days   Having cough, sore throat, nasal congestion   Having improvement with most symptoms   Patient currently feeling mostly normal other than slight nasal drainage and the pain in the right side of the neck  Pain is focal in the right anterior neck  Not sure if there is much swelling in the area  Taking Tylenol and Advil without much improvement    Exposed to RSV     Review of Systems   Constitutional:  Negative for fever.   HENT:  Negative for sore throat.    Skin:  Negative for rash.       PMH:  has a past medical history of Alopecia (2015), Asthma, Daytime sleepiness, Fatigue, Insomnia, and Thyroid disease.    She has no past medical history of Chills, Fever, Gasping for breath, Morning headache, Snoring, or Weight loss.  MEDS:   Current Outpatient Medications:     triamcinolone (KENALOG) 0.025 % ointment, , Disp: , Rfl:     predniSONE (DELTASONE) 20 MG Tab, Take 2 Tablets by mouth every day for 5 days., Disp: 10 Tablet, Rfl: 0    Semaglutide,0.25 or 0.5MG/DOS, (OZEMPIC, 0.25 OR 0.5 MG/DOSE,) 2 MG/3ML Solution Pen-injector, INJECT 0.5MG UNDER SKIN EVERY 7 DAYS, Disp: 3 Each, Rfl: 1    lisinopril (PRINIVIL) 20 MG Tab, Take 1 Tablet by mouth every day., Disp: 90 Tablet, Rfl: 2    levothyroxine (SYNTHROID) 75 MCG Tab, Take 1 Tablet by mouth every morning on an empty stomach., Disp: 90 Tablet, Rfl: 1    albuterol (ACCUNEB) 0.63 MG/3ML nebulizer solution, Take 3 mL by nebulization every four hours as needed for Shortness of Breath., Disp: 3 mL, Rfl: 1    Alcohol Swabs, Wipe site  "with prep pad prior to injection., Disp: 100 Each, Rfl: 2    DUPIXENT 300 MG/2ML Solution Prefilled Syringe injection, , Disp: , Rfl:   ALLERGIES:   Allergies   Allergen Reactions    Azithromycin Rash     All over body    Ciprofloxacin Rash     All over body     SURGHX: History reviewed. No pertinent surgical history.  SOCHX:  reports that she has never smoked. She has never used smokeless tobacco. She reports that she does not currently use alcohol. She reports that she does not use drugs.     Objective:   /70 (BP Location: Right arm, Patient Position: Sitting, BP Cuff Size: Adult)   Pulse 78   Temp 36.8 °C (98.2 °F) (Temporal)   Resp 16   Ht 1.651 m (5' 5\") Comment: PT reported.  Wt 92.3 kg (203 lb 7.8 oz)   SpO2 94%   BMI 33.86 kg/m²     Physical Exam  Constitutional:       General: She is not in acute distress.     Appearance: She is well-developed. She is not diaphoretic.   HENT:      Head: Normocephalic and atraumatic.      Right Ear: Tympanic membrane, ear canal and external ear normal.      Left Ear: Tympanic membrane, ear canal and external ear normal.      Nose: Nose normal.      Mouth/Throat:      Mouth: Mucous membranes are moist.      Pharynx: Oropharynx is clear. No oropharyngeal exudate or posterior oropharyngeal erythema.   Neck:      Comments: Full range of motion of neck, mild swelling of lymph nodes in the right anterior cervical chain with largest palpated lymph node about 1 cm in diameter, tenderness in the upper lymph nodes in this area, no visible swelling of the neck, no overlying erythema of the neck  Pulmonary:      Effort: Pulmonary effort is normal.   Neurological:      Mental Status: She is alert.       Assessment/Plan:   Assessment    1. Lymphadenitis  - predniSONE (DELTASONE) 20 MG Tab; Take 2 Tablets by mouth every day for 5 days.  Dispense: 10 Tablet; Refill: 0    Other orders  - triamcinolone (KENALOG) 0.025 % ointment    Patient with lymphadenitis.  Has some mild " swelling and  moderate pain in the right anterior cervical chain.  No lymph nodes greater than 2 cm diameter.  No overlying erythema in the area.  Normal posterior pharynx exam, no unilateral swelling, no uvular deviation, no neck stiffness.  Do not see any evidence of deep abscess, peritonsillar abscess, meningitis, or other dangerous process.  Advised that lymphadenitis with acute illness can be very common and should self resolve over time like the rest of her symptoms have.  Reviewed risks and benefits of doing steroid medication for supportive care and patient agreeable to taking medication.  No indication for antibiotics at this time.  Given close follow-up precautions if developing any sensation of difficulty swallowing, neck stiffness, fever, or any worsening symptoms.  Follow-up in the urgent care on an as-needed basis.

## 2024-03-08 DIAGNOSIS — E55.9 VITAMIN D INSUFFICIENCY: ICD-10-CM

## 2024-03-08 DIAGNOSIS — E11.9 TYPE 2 DIABETES MELLITUS WITHOUT COMPLICATION, WITHOUT LONG-TERM CURRENT USE OF INSULIN (HCC): ICD-10-CM

## 2024-03-08 RX ORDER — SEMAGLUTIDE 0.68 MG/ML
INJECTION, SOLUTION SUBCUTANEOUS
Qty: 3 EACH | Refills: 1 | Status: SHIPPED | OUTPATIENT
Start: 2024-03-08

## 2024-03-12 ENCOUNTER — HOSPITAL ENCOUNTER (OUTPATIENT)
Dept: LAB | Facility: MEDICAL CENTER | Age: 54
End: 2024-03-12
Attending: NURSE PRACTITIONER
Payer: COMMERCIAL

## 2024-03-12 DIAGNOSIS — E11.9 TYPE 2 DIABETES MELLITUS WITHOUT COMPLICATION, WITHOUT LONG-TERM CURRENT USE OF INSULIN (HCC): ICD-10-CM

## 2024-03-12 DIAGNOSIS — E55.9 VITAMIN D INSUFFICIENCY: ICD-10-CM

## 2024-03-12 LAB
25(OH)D3 SERPL-MCNC: 23 NG/ML (ref 30–100)
EST. AVERAGE GLUCOSE BLD GHB EST-MCNC: 183 MG/DL
HBA1C MFR BLD: 8 % (ref 4–5.6)

## 2024-03-12 PROCEDURE — 36415 COLL VENOUS BLD VENIPUNCTURE: CPT

## 2024-03-12 PROCEDURE — 82306 VITAMIN D 25 HYDROXY: CPT

## 2024-03-12 PROCEDURE — 83036 HEMOGLOBIN GLYCOSYLATED A1C: CPT

## 2024-03-13 DIAGNOSIS — E11.9 TYPE 2 DIABETES MELLITUS WITHOUT COMPLICATION, WITHOUT LONG-TERM CURRENT USE OF INSULIN (HCC): ICD-10-CM

## 2024-03-13 DIAGNOSIS — E55.9 VITAMIN D INSUFFICIENCY: ICD-10-CM

## 2024-03-13 RX ORDER — SEMAGLUTIDE 1.34 MG/ML
1 INJECTION, SOLUTION SUBCUTANEOUS
Qty: 9 ML | Refills: 1 | Status: SHIPPED | OUTPATIENT
Start: 2024-03-13

## 2024-03-13 RX ORDER — ERGOCALCIFEROL 1.25 MG/1
50000 CAPSULE ORAL
Qty: 12 CAPSULE | Refills: 1 | Status: SHIPPED | OUTPATIENT
Start: 2024-03-13

## 2024-03-18 ENCOUNTER — OFFICE VISIT (OUTPATIENT)
Dept: MEDICAL GROUP | Facility: MEDICAL CENTER | Age: 54
End: 2024-03-18
Payer: COMMERCIAL

## 2024-03-18 VITALS
DIASTOLIC BLOOD PRESSURE: 82 MMHG | SYSTOLIC BLOOD PRESSURE: 136 MMHG | HEART RATE: 79 BPM | WEIGHT: 198.6 LBS | BODY MASS INDEX: 33.09 KG/M2 | OXYGEN SATURATION: 95 % | HEIGHT: 65 IN | TEMPERATURE: 97.6 F

## 2024-03-18 DIAGNOSIS — K57.30 DIVERTICULA, COLON: ICD-10-CM

## 2024-03-18 DIAGNOSIS — L65.9 ALOPECIA: Chronic | ICD-10-CM

## 2024-03-18 DIAGNOSIS — R19.5 CHANGE IN STOOL CALIBER: ICD-10-CM

## 2024-03-18 DIAGNOSIS — E11.65 TYPE 2 DIABETES MELLITUS WITH HYPERGLYCEMIA, WITHOUT LONG-TERM CURRENT USE OF INSULIN (HCC): ICD-10-CM

## 2024-03-18 DIAGNOSIS — E03.8 OTHER SPECIFIED HYPOTHYROIDISM: ICD-10-CM

## 2024-03-18 DIAGNOSIS — E66.9 OBESITY (BMI 30-39.9): ICD-10-CM

## 2024-03-18 DIAGNOSIS — R14.0 ABDOMINAL BLOATING: ICD-10-CM

## 2024-03-18 DIAGNOSIS — R19.8 RECTAL FULLNESS: ICD-10-CM

## 2024-03-18 DIAGNOSIS — E55.9 VITAMIN D INSUFFICIENCY: Chronic | ICD-10-CM

## 2024-03-18 DIAGNOSIS — R19.8 ABDOMINAL COMPLAINTS: ICD-10-CM

## 2024-03-18 DIAGNOSIS — Z12.83 SCREENING FOR SKIN CANCER: ICD-10-CM

## 2024-03-18 DIAGNOSIS — K64.0 GRADE I HEMORRHOIDS: ICD-10-CM

## 2024-03-18 DIAGNOSIS — Z80.8 FAMILY HISTORY OF MELANOMA: ICD-10-CM

## 2024-03-18 PROBLEM — E66.09 OBESITY DUE TO EXCESS CALORIES WITH SERIOUS COMORBIDITY: Status: ACTIVE | Noted: 2024-03-18

## 2024-03-18 PROBLEM — K57.90 DIVERTICULOSIS: Status: ACTIVE | Noted: 2024-03-18

## 2024-03-18 PROBLEM — E66.09 OBESITY DUE TO EXCESS CALORIES WITH SERIOUS COMORBIDITY: Status: RESOLVED | Noted: 2024-03-18 | Resolved: 2024-03-18

## 2024-03-18 PROCEDURE — 99214 OFFICE O/P EST MOD 30 MIN: CPT | Performed by: NURSE PRACTITIONER

## 2024-03-18 PROCEDURE — 3079F DIAST BP 80-89 MM HG: CPT | Performed by: NURSE PRACTITIONER

## 2024-03-18 PROCEDURE — 3075F SYST BP GE 130 - 139MM HG: CPT | Performed by: NURSE PRACTITIONER

## 2024-03-18 RX ORDER — LEVOTHYROXINE SODIUM 0.07 MG/1
75 TABLET ORAL
Qty: 90 TABLET | Refills: 3 | Status: SHIPPED | OUTPATIENT
Start: 2024-03-18

## 2024-03-18 ASSESSMENT — PATIENT HEALTH QUESTIONNAIRE - PHQ9: CLINICAL INTERPRETATION OF PHQ2 SCORE: 0

## 2024-03-18 NOTE — PROGRESS NOTES
"Subjective:     HPI:     Alis Gonzalez is a 53 y.o. female  presents to discuss:   Chief Complaint   Patient presents with    Follow-Up     Pt states she has been having bowel issues for a while     Patient presents today as she reports having rectal fullness.  States that when she does have a bowel movement to have looser/watery brown stools.  She also states that she will have thinner softer stools as well alternating.  Psyllium husk made symptoms worse.  Denies any rectal itching.  She also reports abdominal bloating.  Denies any blood in her stool.  She is also noted some mucus present.  Last colonoscopy 2019- \" few diverticuli noticed, internal hemorrhoids\" recall 10 years.    She also reports having noticed with increased abdominal pressure vertical protrusion of her upper abdomen.  -Consistent with diastases recti.  Recommend core exercises at this time.    She does have diabetes and she is on Ozempic however states her symptoms started prior to starting on Ozempic.  GI symptoms started before Ozempic.     She would also like to have a updated referral to dermatology as her father does have history of melanoma.  Needs a skin check.  She also has alopecia.    ROS: : see above      Current Outpatient Medications:     levothyroxine (SYNTHROID) 75 MCG Tab, Take 1 Tablet by mouth every morning on an empty stomach., Disp: 90 Tablet, Rfl: 3    vitamin D2, Ergocalciferol, (DRISDOL) 1.25 MG (06353 UT) Cap capsule, Take 1 Capsule by mouth every 7 days., Disp: 12 Capsule, Rfl: 1    Semaglutide, 1 MG/DOSE, (OZEMPIC, 1 MG/DOSE,) 4 MG/3ML Solution Pen-injector, Inject 1 mg under the skin every 7 days., Disp: 9 mL, Rfl: 1    triamcinolone (KENALOG) 0.025 % ointment, , Disp: , Rfl:     lisinopril (PRINIVIL) 20 MG Tab, Take 1 Tablet by mouth every day., Disp: 90 Tablet, Rfl: 2    albuterol (ACCUNEB) 0.63 MG/3ML nebulizer solution, Take 3 mL by nebulization every four hours as needed for Shortness of Breath., Disp: 3 mL, " "Rfl: 1    Alcohol Swabs, Wipe site with prep pad prior to injection., Disp: 100 Each, Rfl: 2    DUPIXENT 300 MG/2ML Solution Prefilled Syringe injection, , Disp: , Rfl:     Semaglutide,0.25 or 0.5MG/DOS, (OZEMPIC, 0.25 OR 0.5 MG/DOSE,) 2 MG/3ML Solution Pen-injector, INJECT 0.5MG UNDER SKIN EVERY 7 DAYS (Patient not taking: Reported on 3/18/2024), Disp: 3 Each, Rfl: 1    Allergies   Allergen Reactions    Azithromycin Rash     All over body    Ciprofloxacin Rash     All over body       Objective:     Vitals: /82   Pulse 79   Temp 36.4 °C (97.6 °F) (Temporal)   Ht 1.651 m (5' 5\")   Wt 90.1 kg (198 lb 9.6 oz)   SpO2 95%   BMI 33.05 kg/m²    General: Alert, pleasant, NAD  HEENT: Normocephalic.  Neck supple.   Respiratory: no distress, no audible wheezing, RR -WNL  Skin: Warm, dry, no rashes.  Extremities: No leg edema. No discoloration  Neurological: No tremors  Psych:  Affect/mood is normal, judgement is good, memory is intact, grooming is appropriate.    Assessment/Plan:      1. Abdominal complaints  2. Abdominal bloating  3. Rectal fullness  4. Change in stool caliber  5. Diverticula, colon  6. Grade 1 hemorrhoids   New problem, uncertain diagnosis at this time.  Denies any blood in her stool however does report rectal fullness.  She did mention this a few years ago however it had resolved.  Symptoms of rectal fullness have returned along with change in stool caliber, abdominal bloating, and now with mucus in her stool.  Last colonoscopy 2019 with noted few diverticuli in sigmoid colon and internal hemorrhoids.  Recommend consult with gastroenterology further evaluation for possible inflammatory bowel etiology.  - Referral to Gastroenterology    7. Type 2 diabetes mellitus with hyperglycemia, without long-term current use of insulin (HCC)  Chronic.  Not well-controlled.  Continue Ozempic.  She will increase to 1 mg.  Continue lifestyle modification.    8. Obesity (BMI 30-39.9)  - Patient identified as " having weight management issue.  Appropriate orders and counseling given.    9. Vitamin D insufficiency  Chronic.  Recommend supplementing high-dose once weekly.    10. Family history of melanoma  - Referral to Dermatology    11. Alopecia  - Referral to Dermatology    12. Screening for skin cancer  - Referral to Dermatology    13. Other specified hypothyroidism  Stable.  Requesting refills.  - levothyroxine (SYNTHROID) 75 MCG Tab; Take 1 Tablet by mouth every morning on an empty stomach.  Dispense: 90 Tablet; Refill: 3    Return if symptoms worsen or fail to improve.      Comfort GILLIS.

## 2024-03-25 ENCOUNTER — OFFICE VISIT (OUTPATIENT)
Dept: MEDICAL GROUP | Facility: MEDICAL CENTER | Age: 54
End: 2024-03-25
Payer: COMMERCIAL

## 2024-03-25 ENCOUNTER — HOSPITAL ENCOUNTER (OUTPATIENT)
Dept: LAB | Facility: MEDICAL CENTER | Age: 54
End: 2024-03-25
Attending: NURSE PRACTITIONER
Payer: COMMERCIAL

## 2024-03-25 VITALS
DIASTOLIC BLOOD PRESSURE: 74 MMHG | HEIGHT: 65 IN | HEART RATE: 90 BPM | BODY MASS INDEX: 33.09 KG/M2 | TEMPERATURE: 97.6 F | OXYGEN SATURATION: 95 % | RESPIRATION RATE: 16 BRPM | WEIGHT: 198.63 LBS | SYSTOLIC BLOOD PRESSURE: 138 MMHG

## 2024-03-25 DIAGNOSIS — K57.30 DIVERTICULA OF COLON: ICD-10-CM

## 2024-03-25 DIAGNOSIS — R19.5 CHANGE IN STOOL CALIBER: ICD-10-CM

## 2024-03-25 DIAGNOSIS — R34 LOW URINE OUTPUT: ICD-10-CM

## 2024-03-25 DIAGNOSIS — R14.0 ABDOMINAL BLOATING: ICD-10-CM

## 2024-03-25 DIAGNOSIS — R19.8 ABDOMINAL COMPLAINTS: ICD-10-CM

## 2024-03-25 DIAGNOSIS — R19.8 RECTAL FULLNESS: ICD-10-CM

## 2024-03-25 LAB
ALBUMIN SERPL BCP-MCNC: 4.5 G/DL (ref 3.2–4.9)
ALBUMIN/GLOB SERPL: 1.5 G/DL
ALP SERPL-CCNC: 85 U/L (ref 30–99)
ALT SERPL-CCNC: 48 U/L (ref 2–50)
ANION GAP SERPL CALC-SCNC: 14 MMOL/L (ref 7–16)
APPEARANCE UR: CLEAR
AST SERPL-CCNC: 45 U/L (ref 12–45)
BASOPHILS # BLD AUTO: 0.7 % (ref 0–1.8)
BASOPHILS # BLD: 0.06 K/UL (ref 0–0.12)
BILIRUB SERPL-MCNC: 0.5 MG/DL (ref 0.1–1.5)
BILIRUB UR QL STRIP.AUTO: NEGATIVE
BUN SERPL-MCNC: 12 MG/DL (ref 8–22)
CALCIUM ALBUM COR SERPL-MCNC: 9 MG/DL (ref 8.5–10.5)
CALCIUM SERPL-MCNC: 9.4 MG/DL (ref 8.5–10.5)
CHLORIDE SERPL-SCNC: 104 MMOL/L (ref 96–112)
CO2 SERPL-SCNC: 23 MMOL/L (ref 20–33)
COLOR UR: YELLOW
CREAT SERPL-MCNC: 0.51 MG/DL (ref 0.5–1.4)
EOSINOPHIL # BLD AUTO: 0.46 K/UL (ref 0–0.51)
EOSINOPHIL NFR BLD: 5.2 % (ref 0–6.9)
ERYTHROCYTE [DISTWIDTH] IN BLOOD BY AUTOMATED COUNT: 41.5 FL (ref 35.9–50)
ERYTHROCYTE [SEDIMENTATION RATE] IN BLOOD BY WESTERGREN METHOD: 6 MM/HOUR (ref 0–25)
GFR SERPLBLD CREATININE-BSD FMLA CKD-EPI: 110 ML/MIN/1.73 M 2
GLOBULIN SER CALC-MCNC: 3.1 G/DL (ref 1.9–3.5)
GLUCOSE SERPL-MCNC: 98 MG/DL (ref 65–99)
GLUCOSE UR STRIP.AUTO-MCNC: NEGATIVE MG/DL
HCT VFR BLD AUTO: 46.4 % (ref 37–47)
HGB BLD-MCNC: 15.7 G/DL (ref 12–16)
IMM GRANULOCYTES # BLD AUTO: 0.02 K/UL (ref 0–0.11)
IMM GRANULOCYTES NFR BLD AUTO: 0.2 % (ref 0–0.9)
KETONES UR STRIP.AUTO-MCNC: ABNORMAL MG/DL
LEUKOCYTE ESTERASE UR QL STRIP.AUTO: NEGATIVE
LYMPHOCYTES # BLD AUTO: 3.26 K/UL (ref 1–4.8)
LYMPHOCYTES NFR BLD: 36.5 % (ref 22–41)
MCH RBC QN AUTO: 30.5 PG (ref 27–33)
MCHC RBC AUTO-ENTMCNC: 33.8 G/DL (ref 32.2–35.5)
MCV RBC AUTO: 90.3 FL (ref 81.4–97.8)
MICRO URNS: ABNORMAL
MONOCYTES # BLD AUTO: 0.59 K/UL (ref 0–0.85)
MONOCYTES NFR BLD AUTO: 6.6 % (ref 0–13.4)
NEUTROPHILS # BLD AUTO: 4.54 K/UL (ref 1.82–7.42)
NEUTROPHILS NFR BLD: 50.8 % (ref 44–72)
NITRITE UR QL STRIP.AUTO: NEGATIVE
NRBC # BLD AUTO: 0 K/UL
NRBC BLD-RTO: 0 /100 WBC (ref 0–0.2)
PH UR STRIP.AUTO: 5.5 [PH] (ref 5–8)
PLATELET # BLD AUTO: 480 K/UL (ref 164–446)
PMV BLD AUTO: 9.1 FL (ref 9–12.9)
POTASSIUM SERPL-SCNC: 3.9 MMOL/L (ref 3.6–5.5)
PROT SERPL-MCNC: 7.6 G/DL (ref 6–8.2)
PROT UR QL STRIP: NEGATIVE MG/DL
RBC # BLD AUTO: 5.14 M/UL (ref 4.2–5.4)
RBC UR QL AUTO: NEGATIVE
SODIUM SERPL-SCNC: 141 MMOL/L (ref 135–145)
SP GR UR STRIP.AUTO: 1.03
UROBILINOGEN UR STRIP.AUTO-MCNC: 0.2 MG/DL
WBC # BLD AUTO: 8.9 K/UL (ref 4.8–10.8)

## 2024-03-25 PROCEDURE — 85025 COMPLETE CBC W/AUTO DIFF WBC: CPT

## 2024-03-25 PROCEDURE — 3078F DIAST BP <80 MM HG: CPT | Performed by: NURSE PRACTITIONER

## 2024-03-25 PROCEDURE — 99214 OFFICE O/P EST MOD 30 MIN: CPT | Performed by: NURSE PRACTITIONER

## 2024-03-25 PROCEDURE — 80053 COMPREHEN METABOLIC PANEL: CPT

## 2024-03-25 PROCEDURE — 36415 COLL VENOUS BLD VENIPUNCTURE: CPT

## 2024-03-25 PROCEDURE — 3075F SYST BP GE 130 - 139MM HG: CPT | Performed by: NURSE PRACTITIONER

## 2024-03-25 PROCEDURE — 81003 URINALYSIS AUTO W/O SCOPE: CPT

## 2024-03-25 PROCEDURE — 85652 RBC SED RATE AUTOMATED: CPT

## 2024-03-25 NOTE — PROGRESS NOTES
Subjective:     HPI:     Alis Gonzalez is a 53 y.o. female presents to   discuss:   Chief Complaint   Patient presents with    Abdominal Pain     Pt was seen on 3/18/24, pt states the pain has not gotten any better     Patient presents to follow-up.  She is becoming concerned as she does note that she has some blood in her stool now.  She does report that she is passing gas.  No BM in 48 hours.  Still having abdominal bloating.  Has noticed some orange/red mucus discharge.  No fevers.  She is also concerned as she feels like she is not urinating as much.  Has been trying to stay hydrated has concerns about her kidney function.  She comments that she has reduced appetite.  No cramping and the pain in her abdomen has somewhat subsided although still having difficulties with having bowel movement.    No problems updated.     ROS: : see above      Current Outpatient Medications:     levothyroxine (SYNTHROID) 75 MCG Tab, Take 1 Tablet by mouth every morning on an empty stomach., Disp: 90 Tablet, Rfl: 3    vitamin D2, Ergocalciferol, (DRISDOL) 1.25 MG (81262 UT) Cap capsule, Take 1 Capsule by mouth every 7 days., Disp: 12 Capsule, Rfl: 1    Semaglutide, 1 MG/DOSE, (OZEMPIC, 1 MG/DOSE,) 4 MG/3ML Solution Pen-injector, Inject 1 mg under the skin every 7 days., Disp: 9 mL, Rfl: 1    triamcinolone (KENALOG) 0.025 % ointment, , Disp: , Rfl:     lisinopril (PRINIVIL) 20 MG Tab, Take 1 Tablet by mouth every day., Disp: 90 Tablet, Rfl: 2    albuterol (ACCUNEB) 0.63 MG/3ML nebulizer solution, Take 3 mL by nebulization every four hours as needed for Shortness of Breath., Disp: 3 mL, Rfl: 1    Alcohol Swabs, Wipe site with prep pad prior to injection., Disp: 100 Each, Rfl: 2    DUPIXENT 300 MG/2ML Solution Prefilled Syringe injection, , Disp: , Rfl:     Semaglutide,0.25 or 0.5MG/DOS, (OZEMPIC, 0.25 OR 0.5 MG/DOSE,) 2 MG/3ML Solution Pen-injector, INJECT 0.5MG UNDER SKIN EVERY 7 DAYS (Patient not taking: Reported on 3/25/2024),  "Disp: 3 Each, Rfl: 1    Allergies   Allergen Reactions    Azithromycin Rash     All over body    Ciprofloxacin Rash     All over body       Objective:     Vitals: /74   Pulse 90   Temp 36.4 °C (97.6 °F) (Temporal)   Resp 16   Ht 1.651 m (5' 5\")   Wt 90.1 kg (198 lb 10.2 oz)   SpO2 95%   BMI 33.05 kg/m²    General: Alert, pleasant, NAD  HEENT: Normocephalic.  Neck supple.   Respiratory: no distress, no audible wheezing, RR -WNL  Skin: Warm, dry, no rashes.  Abdomen: rounded, soft.  Extremities: No leg edema. No discoloration  Neurological: No tremors  Psych:  Affect/mood is normal, judgement is good, memory is intact, grooming is appropriate.    Assessment/Plan:      1. Abdominal complaints  2. Abdominal bloating  3. Rectal fullness  4. Change in stool caliber  Ongoing. Symptoms not improving.   She appears stable in the clinic. Non toxic.   Ref to colorectal surgery to evaluate further.  Er precautions.   - Sed Rate; Future  - CBC WITH DIFFERENTIAL; Future  - CT-ABDOMEN-PELVIS WITH; Future  - Referral to Colorectal Surgery    5. Diverticula of colon  Have ordered CT to r/o acute diverticulitis.   - Comp Metabolic Panel; Future  - Sed Rate; Future  - CBC WITH DIFFERENTIAL; Future  - CT-ABDOMEN-PELVIS WITH; Future  - Referral to Colorectal Surgery    6. Low urine output  - URINALYSIS,CULTURE IF INDICATED; Future  - CT-ABDOMEN-PELVIS WITH; Future       No follow-ups on file.    Comfort ROSAS"

## 2024-03-26 ENCOUNTER — HOSPITAL ENCOUNTER (OUTPATIENT)
Dept: RADIOLOGY | Facility: MEDICAL CENTER | Age: 54
End: 2024-03-26
Attending: NURSE PRACTITIONER
Payer: COMMERCIAL

## 2024-03-26 DIAGNOSIS — R19.5 CHANGE IN STOOL CALIBER: ICD-10-CM

## 2024-03-26 DIAGNOSIS — R14.0 ABDOMINAL BLOATING: ICD-10-CM

## 2024-03-26 DIAGNOSIS — R19.8 ABDOMINAL COMPLAINTS: ICD-10-CM

## 2024-03-26 PROCEDURE — 74177 CT ABD & PELVIS W/CONTRAST: CPT

## 2024-03-26 PROCEDURE — 700117 HCHG RX CONTRAST REV CODE 255: Performed by: NURSE PRACTITIONER

## 2024-03-26 RX ADMIN — IOHEXOL 100 ML: 350 INJECTION, SOLUTION INTRAVENOUS at 16:24

## 2024-04-17 ENCOUNTER — OFFICE VISIT (OUTPATIENT)
Dept: DERMATOLOGY | Facility: IMAGING CENTER | Age: 54
End: 2024-04-17
Payer: COMMERCIAL

## 2024-04-17 DIAGNOSIS — L72.0 MILIA: ICD-10-CM

## 2024-04-17 DIAGNOSIS — D22.9 MULTIPLE NEVI: ICD-10-CM

## 2024-04-17 DIAGNOSIS — D18.01 CHERRY ANGIOMA: ICD-10-CM

## 2024-04-17 DIAGNOSIS — L80 VITILIGO: ICD-10-CM

## 2024-04-17 DIAGNOSIS — L65.9 HAIR LOSS: ICD-10-CM

## 2024-04-17 DIAGNOSIS — Z12.83 SKIN CANCER SCREENING: ICD-10-CM

## 2024-04-17 DIAGNOSIS — L81.4 LENTIGINES: ICD-10-CM

## 2024-04-17 PROCEDURE — 99213 OFFICE O/P EST LOW 20 MIN: CPT | Performed by: NURSE PRACTITIONER

## 2024-04-17 NOTE — PROGRESS NOTES
DERMATOLOGY NOTE  NEW VISIT       Chief complaint: Establish Care (ELENI)     Denies new, growing, changing, itching or bleeding skin lesions today.         History of skin cancer: No  History of precancers/actinic keratoses: No  History of biopsies:Yes, Details: scalp for Alopecia  History of blistering/severe sunburns:No  Family history of skin cancer:Yes, Details: Father on face unknown type.   Family history of atypical moles:No      Allergies   Allergen Reactions    Azithromycin Rash     All over body    Ciprofloxacin Rash     All over body        MEDICATIONS:  Medications relevant to specialty reviewed.     REVIEW OF SYSTEMS:   Positive for skin (see HPI)  Negative for fevers and chills       EXAM:  There were no vitals taken for this visit.  Constitutional: Well-developed, well-nourished, and in no distress.     A total body skin exam was performed excluding the genitals per patient preference and including the following areas: head (including face), neck, chest, abdomen, groin/buttocks, back, bilateral upper extremities, and bilateral lower extremities with the following pertinent findings listed below and/or in assessment/plan.     -sun exposed skin of trunk and b/l upper, lower extremities and face with scattered clinically benign light brown reticulated macules all of which were morphologically similar and none of which were suspicious for skin cancer today on exam    -Several scattered 1-3mm bright red macules and thin papules on the trunk, face and extremities    -Multiple tan light brown skin-colored macules papules scattered over the trunk >> extremities--All with benign-appearing pigment network patterns on dermoscopy    -Milia on face    -Patches of vitiligo bilateral wrist/hand    Erythema to neck and upper chest    Notable hair thinning to scalp with evidence of regrowth, minimal to not eyebrow, eyelash arm or leg hair--pt states hair starting to grow back, but very slowly    IMPRESSION /  PLAN:    1. Lentigines  - Benign-appearing nature of lesions discussed during exam.   - Advised to continue to monitor for any return to clinic for new or concerning changes.      2. Cherry angioma  - Benign-appearing nature of lesions discussed during exam.   - Advised to continue to monitor for any return to clinic for new or concerning changes.      3. Multiple nevi  - Benign-appearing nature of lesions discussed during exam.   - Advised to continue to monitor for any return to clinic for new or concerning changes.  - ABCDE's of melanoma discussed/handout given      4. Milia  - Benign-appearing nature of lesions discussed during exam.   - Advised to continue to monitor for any return to clinic for new or concerning changes.      5. Vitiligo  - Benign-appearing nature of lesions discussed during exam.   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      6. 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 Skin cancer screening  Skin cancer education  discussed importance of sun protective clothing, eyewear in addition to the use of broad spectrum sunscreen with SPF 30 or greater, as well as need for reapplication ~every 2 hours when exposed to UVR/handout given  discussed importance following up for any new or changing lesions as noted in handout given, but every 12 months exams in clinic in the setting of dermatologic history  ABCDE's of melanoma discussed/handout given        Patient verbalized understanding and agrees with plan regarding the above        Please note that this dictation was created using voice  recognition software. I have made every reasonable attempt to correct obvious errors, but I expect that there are errors of grammar and possibly content that I did not discover before finalizing the note.      Return to clinic in: Return in about 1 year (around 4/17/2025) for ELENI, 3 months, hair loss. and as needed for any new or changing skin lesions.

## 2024-04-23 ENCOUNTER — TELEPHONE (OUTPATIENT)
Dept: DERMATOLOGY | Facility: IMAGING CENTER | Age: 54
End: 2024-04-23
Payer: COMMERCIAL

## 2024-04-23 DIAGNOSIS — L65.9 HAIR LOSS: ICD-10-CM

## 2024-04-25 NOTE — TELEPHONE ENCOUNTER
Received request via: Pharmacy    Was the patient seen in the last year in this department? Yes    Does the patient have an active prescription (recently filled or refills available) for medication(s) requested? No    Pharmacy Name: [facility]    Does the patient have nursing home Plus and need 100 day supply (blood pressure, diabetes and cholesterol meds only)? Patient does not have SCP

## 2024-04-30 RX ORDER — LISINOPRIL 20 MG/1
20 TABLET ORAL DAILY
Qty: 90 TABLET | Refills: 2 | Status: SHIPPED | OUTPATIENT
Start: 2024-04-30

## 2024-05-02 ENCOUNTER — APPOINTMENT (OUTPATIENT)
Dept: MEDICAL GROUP | Facility: MEDICAL CENTER | Age: 54
End: 2024-05-02
Payer: COMMERCIAL

## 2024-05-02 VITALS
WEIGHT: 190 LBS | DIASTOLIC BLOOD PRESSURE: 70 MMHG | RESPIRATION RATE: 16 BRPM | OXYGEN SATURATION: 96 % | SYSTOLIC BLOOD PRESSURE: 122 MMHG | BODY MASS INDEX: 31.65 KG/M2 | HEART RATE: 84 BPM | TEMPERATURE: 97.4 F | HEIGHT: 65 IN

## 2024-05-02 DIAGNOSIS — R19.5 CHANGE IN STOOL CALIBER: ICD-10-CM

## 2024-05-02 DIAGNOSIS — E11.65 TYPE 2 DIABETES MELLITUS WITH HYPERGLYCEMIA, WITHOUT LONG-TERM CURRENT USE OF INSULIN (HCC): ICD-10-CM

## 2024-05-02 DIAGNOSIS — R19.8 RECTAL FULLNESS: ICD-10-CM

## 2024-05-02 DIAGNOSIS — R14.0 ABDOMINAL BLOATING: ICD-10-CM

## 2024-05-02 DIAGNOSIS — R19.8 ABDOMINAL COMPLAINTS: ICD-10-CM

## 2024-05-02 PROCEDURE — 3078F DIAST BP <80 MM HG: CPT | Performed by: NURSE PRACTITIONER

## 2024-05-02 PROCEDURE — 99214 OFFICE O/P EST MOD 30 MIN: CPT | Performed by: NURSE PRACTITIONER

## 2024-05-02 PROCEDURE — 3074F SYST BP LT 130 MM HG: CPT | Performed by: NURSE PRACTITIONER

## 2024-05-02 ASSESSMENT — FIBROSIS 4 INDEX: FIB4 SCORE: 0.74

## 2024-05-02 NOTE — PROGRESS NOTES
Subjective:     Alis Gonzalez is a 53 y.o. female presents to discuss:   Chief Complaint   Patient presents with    Follow-Up       Verbal consent was acquired by the patient to use [employer] ambient listening note generation during this visit Yes     History of Present Illness    Follow up:    We have reviewed the CT scan and the blood work that was ordered on her last office visit to evaluate her GI complaints.  No alarming findings.  No evidence of diverticulitis, inflammatory markers were not elevated.  Negative urinalysis.    The patient has been experiencing abdominal pain for the past 3.5 weeks, accompanied by a sensation of murky stools and difficulty in passing stool. Over the past 2 days, she has reported a 50 percent improvement in her condition, with only minor bowel movements. She has been taking a probiotic for the past 4 to 5 days, which she believes has provided some relief. She has been taking Ozempic every Friday, which she suspects may be contributing to her stomach pain. She experienced severe pain last Friday, which has since subsided. Initially, she was on a 0.5 mg dose of Ozempic, which was increased to 1 mg, but her stomach symptoms worsened. She has been on the 1 mg dose for a month now. She denies any fevers or nausea. She is planning a trip to [facility] for 2 weeks in 07/2024 and wants to get her symptoms under control. She plans to continue with the Ozempic as her next dose is tomorrow and if her GI symptoms return then she will stop taking the Ozempic. She denies any symptoms when she eats.    The patient had reported difficulty urinating, with a weak stream of urine.  Recent urinalysis was negative.  She believes her condition has improved over the past few days. She denies any urge to urinate. She attributes her symptoms to Ozempic. She denies any hematuria, incontinence, or pelvic pressure. She denies any vomiting or nausea. Her appetite is good. She suspects the Ozempic might be  "causing some depression. She denies any back pain.    ROS: : see above      Current Outpatient Medications:     lisinopril (PRINIVIL) 20 MG Tab, TAKE 1 TABLET BY MOUTH EVERY DAY, Disp: 90 Tablet, Rfl: 2    NON SPECIFIED, Minoxidil 10% with latanaprost 0.01% with finasteride 0.1% with biotin 0.2%, apply twice a day to scalp, Disp: 30 Each, Rfl: 3    levothyroxine (SYNTHROID) 75 MCG Tab, Take 1 Tablet by mouth every morning on an empty stomach., Disp: 90 Tablet, Rfl: 3    vitamin D2, Ergocalciferol, (DRISDOL) 1.25 MG (09388 UT) Cap capsule, Take 1 Capsule by mouth every 7 days., Disp: 12 Capsule, Rfl: 1    Semaglutide, 1 MG/DOSE, (OZEMPIC, 1 MG/DOSE,) 4 MG/3ML Solution Pen-injector, Inject 1 mg under the skin every 7 days., Disp: 9 mL, Rfl: 1    Semaglutide,0.25 or 0.5MG/DOS, (OZEMPIC, 0.25 OR 0.5 MG/DOSE,) 2 MG/3ML Solution Pen-injector, INJECT 0.5MG UNDER SKIN EVERY 7 DAYS (Patient not taking: Reported on 3/25/2024), Disp: 3 Each, Rfl: 1    triamcinolone (KENALOG) 0.025 % ointment, , Disp: , Rfl:     albuterol (ACCUNEB) 0.63 MG/3ML nebulizer solution, Take 3 mL by nebulization every four hours as needed for Shortness of Breath., Disp: 3 mL, Rfl: 1    Alcohol Swabs, Wipe site with prep pad prior to injection., Disp: 100 Each, Rfl: 2    DUPIXENT 300 MG/2ML Solution Prefilled Syringe injection, , Disp: , Rfl:     Allergies   Allergen Reactions    Azithromycin Rash     All over body    Ciprofloxacin Rash     All over body       Objective:     Vitals: /70   Pulse 84   Temp 36.3 °C (97.4 °F)   Resp 16   Ht 1.651 m (5' 5\")   Wt 86.2 kg (190 lb)   SpO2 96%   BMI 31.62 kg/m²    General: Alert, pleasant, NAD  HEENT: Normocephalic.  Neck supple.   Respiratory: no distress, no audible wheezing, RR -WNL  Skin: Warm, dry, no rashes.  Extremities: No leg edema. No discoloration  Neurological: No tremors  Psych:  Affect/mood is normal, judgement is good, memory is intact, grooming is appropriate.      Assessment/Plan: "      Assessment & Plan  1. Abdominal pain.  The patient's vital signs and blood pressure are within the normal range. The presence of mucus in her stool could be indicative of an infection or an absorption issue. The patient is advised to persist with her current probiotics regimen. Should there be any changes in her symptoms, she is advised to discontinue the probiotics.    1. Abdominal complaints  2. Abdominal bloating  3. Change in stool caliber  4. Rectal fullness  Patient reports of the last few days her symptoms have improved.  CT scan was unremarkable for any acute findings.  Blood work essentially unremarkable as well.  She would like to continue with the Ozempic and she may decide to stop if GI symptoms are exacerbated after her next dose.  She would like to continue to monitor her symptoms.  She will continue to take the probiotics.  She does have a GI appointment in August.     5. Type 2 diabetes mellitus with hyperglycemia, without long-term current use of insulin (HCC)  Chronic. Not well controlled.   For now she will continue Ozempic-however may need to stop d/t GI SE's. Continue dietary changes.     Return in about 3 months (around 8/2/2024).    {I have placed the above orders and discussed them with an approved delegating provider. The MA is performing the below orders under the direction of Dr. Martín ROSAS

## 2024-05-15 ENCOUNTER — HOSPITAL ENCOUNTER (OUTPATIENT)
Dept: RADIOLOGY | Facility: MEDICAL CENTER | Age: 54
End: 2024-05-15
Attending: NURSE PRACTITIONER
Payer: COMMERCIAL

## 2024-05-15 DIAGNOSIS — Z12.31 ENCOUNTER FOR SCREENING MAMMOGRAM FOR MALIGNANT NEOPLASM OF BREAST: ICD-10-CM

## 2024-06-17 DIAGNOSIS — E11.65 TYPE 2 DIABETES MELLITUS WITH HYPERGLYCEMIA, WITHOUT LONG-TERM CURRENT USE OF INSULIN (HCC): ICD-10-CM

## 2024-06-17 DIAGNOSIS — E55.9 VITAMIN D INSUFFICIENCY: ICD-10-CM

## 2024-06-21 ENCOUNTER — HOSPITAL ENCOUNTER (OUTPATIENT)
Dept: LAB | Facility: MEDICAL CENTER | Age: 54
End: 2024-06-21
Attending: NURSE PRACTITIONER
Payer: COMMERCIAL

## 2024-06-21 DIAGNOSIS — E55.9 VITAMIN D INSUFFICIENCY: ICD-10-CM

## 2024-06-21 DIAGNOSIS — E11.65 TYPE 2 DIABETES MELLITUS WITH HYPERGLYCEMIA, WITHOUT LONG-TERM CURRENT USE OF INSULIN (HCC): ICD-10-CM

## 2024-06-21 LAB
25(OH)D3 SERPL-MCNC: 32 NG/ML (ref 30–100)
ALBUMIN SERPL BCP-MCNC: 4.2 G/DL (ref 3.2–4.9)
ALBUMIN/GLOB SERPL: 1.4 G/DL
ALP SERPL-CCNC: 87 U/L (ref 30–99)
ALT SERPL-CCNC: 26 U/L (ref 2–50)
ANION GAP SERPL CALC-SCNC: 14 MMOL/L (ref 7–16)
AST SERPL-CCNC: 23 U/L (ref 12–45)
BILIRUB SERPL-MCNC: 0.3 MG/DL (ref 0.1–1.5)
BUN SERPL-MCNC: 13 MG/DL (ref 8–22)
CALCIUM ALBUM COR SERPL-MCNC: 9.1 MG/DL (ref 8.5–10.5)
CALCIUM SERPL-MCNC: 9.3 MG/DL (ref 8.5–10.5)
CHLORIDE SERPL-SCNC: 103 MMOL/L (ref 96–112)
CO2 SERPL-SCNC: 22 MMOL/L (ref 20–33)
CREAT SERPL-MCNC: 0.54 MG/DL (ref 0.5–1.4)
CREAT UR-MCNC: 170.86 MG/DL
EST. AVERAGE GLUCOSE BLD GHB EST-MCNC: 143 MG/DL
GFR SERPLBLD CREATININE-BSD FMLA CKD-EPI: 108 ML/MIN/1.73 M 2
GLOBULIN SER CALC-MCNC: 2.9 G/DL (ref 1.9–3.5)
GLUCOSE SERPL-MCNC: 163 MG/DL (ref 65–99)
HBA1C MFR BLD: 6.6 % (ref 4–5.6)
MICROALBUMIN UR-MCNC: <1.2 MG/DL
MICROALBUMIN/CREAT UR: NORMAL MG/G (ref 0–30)
POTASSIUM SERPL-SCNC: 4.2 MMOL/L (ref 3.6–5.5)
PROT SERPL-MCNC: 7.1 G/DL (ref 6–8.2)
SODIUM SERPL-SCNC: 139 MMOL/L (ref 135–145)

## 2024-06-21 PROCEDURE — 82570 ASSAY OF URINE CREATININE: CPT

## 2024-06-21 PROCEDURE — 83036 HEMOGLOBIN GLYCOSYLATED A1C: CPT

## 2024-06-21 PROCEDURE — 82306 VITAMIN D 25 HYDROXY: CPT

## 2024-06-21 PROCEDURE — 80053 COMPREHEN METABOLIC PANEL: CPT

## 2024-06-21 PROCEDURE — 36415 COLL VENOUS BLD VENIPUNCTURE: CPT

## 2024-06-21 PROCEDURE — 82043 UR ALBUMIN QUANTITATIVE: CPT

## 2024-07-06 ENCOUNTER — OFFICE VISIT (OUTPATIENT)
Dept: URGENT CARE | Facility: PHYSICIAN GROUP | Age: 54
End: 2024-07-06
Payer: COMMERCIAL

## 2024-07-06 VITALS
HEIGHT: 65 IN | BODY MASS INDEX: 31.02 KG/M2 | WEIGHT: 186.2 LBS | SYSTOLIC BLOOD PRESSURE: 118 MMHG | HEART RATE: 77 BPM | TEMPERATURE: 98.7 F | OXYGEN SATURATION: 95 % | DIASTOLIC BLOOD PRESSURE: 76 MMHG | RESPIRATION RATE: 16 BRPM

## 2024-07-06 DIAGNOSIS — E11.65 TYPE 2 DIABETES MELLITUS WITH HYPERGLYCEMIA, WITHOUT LONG-TERM CURRENT USE OF INSULIN (HCC): Chronic | ICD-10-CM

## 2024-07-06 DIAGNOSIS — R21 RASH: ICD-10-CM

## 2024-07-06 DIAGNOSIS — I10 ESSENTIAL HYPERTENSION: Chronic | ICD-10-CM

## 2024-07-06 DIAGNOSIS — G47.00 INSOMNIA, UNSPECIFIED TYPE: Chronic | ICD-10-CM

## 2024-07-06 PROCEDURE — 3078F DIAST BP <80 MM HG: CPT | Performed by: FAMILY MEDICINE

## 2024-07-06 PROCEDURE — 99214 OFFICE O/P EST MOD 30 MIN: CPT | Performed by: FAMILY MEDICINE

## 2024-07-06 PROCEDURE — 3074F SYST BP LT 130 MM HG: CPT | Performed by: FAMILY MEDICINE

## 2024-07-06 RX ORDER — TRIAMCINOLONE ACETONIDE 40 MG/ML
40 INJECTION, SUSPENSION INTRA-ARTICULAR; INTRAMUSCULAR ONCE
Status: COMPLETED | OUTPATIENT
Start: 2024-07-06 | End: 2024-07-06

## 2024-07-06 RX ORDER — DIAZEPAM 5 MG/1
5 TABLET ORAL NIGHTLY PRN
Qty: 10 TABLET | Refills: 0 | Status: SHIPPED | OUTPATIENT
Start: 2024-07-06 | End: 2024-07-16

## 2024-07-06 RX ADMIN — TRIAMCINOLONE ACETONIDE 40 MG: 40 INJECTION, SUSPENSION INTRA-ARTICULAR; INTRAMUSCULAR at 10:17

## 2024-07-06 ASSESSMENT — FIBROSIS 4 INDEX: FIB4 SCORE: 0.52

## 2024-07-06 ASSESSMENT — ENCOUNTER SYMPTOMS
INSOMNIA: 1
HALLUCINATIONS: 0
NERVOUS/ANXIOUS: 0

## 2024-07-12 RX ORDER — SEMAGLUTIDE 1.34 MG/ML
1 INJECTION, SOLUTION SUBCUTANEOUS
Qty: 9 ML | Refills: 1 | Status: SHIPPED | OUTPATIENT
Start: 2024-07-12

## 2024-09-20 ENCOUNTER — TELEPHONE (OUTPATIENT)
Dept: DERMATOLOGY | Facility: IMAGING CENTER | Age: 54
End: 2024-09-20
Payer: COMMERCIAL

## 2024-09-23 ENCOUNTER — OFFICE VISIT (OUTPATIENT)
Dept: DERMATOLOGY | Facility: IMAGING CENTER | Age: 54
End: 2024-09-23
Payer: COMMERCIAL

## 2024-09-23 DIAGNOSIS — L65.9 HAIR LOSS: ICD-10-CM

## 2024-09-23 PROCEDURE — 99213 OFFICE O/P EST LOW 20 MIN: CPT | Performed by: NURSE PRACTITIONER

## 2024-09-23 NOTE — PROGRESS NOTES
DERMATOLOGY NOTE  NEW VISIT       Chief complaint: Follow-Up     Patient here for a FV on scalp         History of skin cancer: No  History of precancers/actinic keratoses: No  History of biopsies:Yes, Details: scalp for Alopecia  History of blistering/severe sunburns:No  Family history of skin cancer:Yes, Details: Father on face unknown type.   Family history of atypical moles:No      Allergies   Allergen Reactions    Azithromycin Rash     All over body    Ciprofloxacin Rash     All over body        MEDICATIONS:  Medications relevant to specialty reviewed.     REVIEW OF SYSTEMS:   Positive for skin (see HPI)  Negative for fevers and chills       EXAM:  There were no vitals taken for this visit.  Constitutional: Well-developed, well-nourished, and in no distress.     A focused skin exam was performed including the affected areas of the scalp. Notable findings on exam today listed below and/or in assessment/plan.         Notable hair thinning to scalp with evidence of regrowth, minimal to not eyebrow, eyelash arm or leg hair--pt states hair starting to grow back, but very slowly    IMPRESSION / PLAN:      1. 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 I have made every reasonable attempt to correct obvious errors, but I expect that there are errors of grammar and possibly content that I did not discover before finalizing the note.      Return to clinic in: Return for PRN. and as  needed for any new or changing skin lesions.

## 2024-10-02 ENCOUNTER — APPOINTMENT (OUTPATIENT)
Dept: MEDICAL GROUP | Facility: MEDICAL CENTER | Age: 54
End: 2024-10-02
Payer: COMMERCIAL

## 2024-10-18 ENCOUNTER — HOSPITAL ENCOUNTER (EMERGENCY)
Facility: MEDICAL CENTER | Age: 54
End: 2024-10-18
Attending: STUDENT IN AN ORGANIZED HEALTH CARE EDUCATION/TRAINING PROGRAM
Payer: COMMERCIAL

## 2024-10-18 ENCOUNTER — APPOINTMENT (OUTPATIENT)
Dept: RADIOLOGY | Facility: MEDICAL CENTER | Age: 54
End: 2024-10-18
Attending: STUDENT IN AN ORGANIZED HEALTH CARE EDUCATION/TRAINING PROGRAM
Payer: COMMERCIAL

## 2024-10-18 ENCOUNTER — OFFICE VISIT (OUTPATIENT)
Dept: URGENT CARE | Facility: PHYSICIAN GROUP | Age: 54
End: 2024-10-18
Payer: COMMERCIAL

## 2024-10-18 VITALS
OXYGEN SATURATION: 93 % | HEART RATE: 80 BPM | DIASTOLIC BLOOD PRESSURE: 116 MMHG | SYSTOLIC BLOOD PRESSURE: 163 MMHG | RESPIRATION RATE: 18 BRPM | TEMPERATURE: 97.2 F | HEIGHT: 65 IN | WEIGHT: 193.56 LBS | BODY MASS INDEX: 32.25 KG/M2

## 2024-10-18 VITALS
SYSTOLIC BLOOD PRESSURE: 134 MMHG | TEMPERATURE: 98.9 F | WEIGHT: 188 LBS | HEART RATE: 82 BPM | HEIGHT: 65 IN | RESPIRATION RATE: 16 BRPM | OXYGEN SATURATION: 97 % | BODY MASS INDEX: 31.32 KG/M2 | DIASTOLIC BLOOD PRESSURE: 80 MMHG

## 2024-10-18 DIAGNOSIS — K52.9 COLITIS: ICD-10-CM

## 2024-10-18 DIAGNOSIS — R10.84 GENERALIZED ABDOMINAL PAIN: ICD-10-CM

## 2024-10-18 DIAGNOSIS — T61.11XA UNINTENTIONAL SCOMBROID FISH POISONING, INITIAL ENCOUNTER: ICD-10-CM

## 2024-10-18 DIAGNOSIS — K92.1 BLOOD CLOTS IN STOOL: ICD-10-CM

## 2024-10-18 LAB
ALBUMIN SERPL BCP-MCNC: 4.5 G/DL (ref 3.2–4.9)
ALBUMIN/GLOB SERPL: 1.6 G/DL
ALP SERPL-CCNC: 91 U/L (ref 30–99)
ALT SERPL-CCNC: 29 U/L (ref 2–50)
ANION GAP SERPL CALC-SCNC: 13 MMOL/L (ref 7–16)
APPEARANCE UR: CLEAR
AST SERPL-CCNC: 20 U/L (ref 12–45)
BASOPHILS # BLD AUTO: 0.5 % (ref 0–1.8)
BASOPHILS # BLD: 0.05 K/UL (ref 0–0.12)
BILIRUB SERPL-MCNC: 0.4 MG/DL (ref 0.1–1.5)
BILIRUB UR QL STRIP.AUTO: NEGATIVE
BUN SERPL-MCNC: 14 MG/DL (ref 8–22)
CALCIUM ALBUM COR SERPL-MCNC: 9.7 MG/DL (ref 8.5–10.5)
CALCIUM SERPL-MCNC: 10.1 MG/DL (ref 8.5–10.5)
CHLORIDE SERPL-SCNC: 103 MMOL/L (ref 96–112)
CO2 SERPL-SCNC: 25 MMOL/L (ref 20–33)
COLOR UR: YELLOW
CREAT SERPL-MCNC: 0.54 MG/DL (ref 0.5–1.4)
EOSINOPHIL # BLD AUTO: 0.23 K/UL (ref 0–0.51)
EOSINOPHIL NFR BLD: 2.3 % (ref 0–6.9)
ERYTHROCYTE [DISTWIDTH] IN BLOOD BY AUTOMATED COUNT: 43.2 FL (ref 35.9–50)
GFR SERPLBLD CREATININE-BSD FMLA CKD-EPI: 108 ML/MIN/1.73 M 2
GLOBULIN SER CALC-MCNC: 2.9 G/DL (ref 1.9–3.5)
GLUCOSE SERPL-MCNC: 104 MG/DL (ref 65–99)
GLUCOSE UR STRIP.AUTO-MCNC: NEGATIVE MG/DL
HCT VFR BLD AUTO: 46.6 % (ref 37–47)
HGB BLD-MCNC: 15.5 G/DL (ref 12–16)
IMM GRANULOCYTES # BLD AUTO: 0.03 K/UL (ref 0–0.11)
IMM GRANULOCYTES NFR BLD AUTO: 0.3 % (ref 0–0.9)
KETONES UR STRIP.AUTO-MCNC: NEGATIVE MG/DL
LEUKOCYTE ESTERASE UR QL STRIP.AUTO: NEGATIVE
LIPASE SERPL-CCNC: 70 U/L (ref 11–82)
LYMPHOCYTES # BLD AUTO: 2.85 K/UL (ref 1–4.8)
LYMPHOCYTES NFR BLD: 28.6 % (ref 22–41)
MCH RBC QN AUTO: 30.3 PG (ref 27–33)
MCHC RBC AUTO-ENTMCNC: 33.3 G/DL (ref 32.2–35.5)
MCV RBC AUTO: 91.2 FL (ref 81.4–97.8)
MICRO URNS: NORMAL
MONOCYTES # BLD AUTO: 0.82 K/UL (ref 0–0.85)
MONOCYTES NFR BLD AUTO: 8.2 % (ref 0–13.4)
NEUTROPHILS # BLD AUTO: 5.99 K/UL (ref 1.82–7.42)
NEUTROPHILS NFR BLD: 60.1 % (ref 44–72)
NITRITE UR QL STRIP.AUTO: NEGATIVE
NRBC # BLD AUTO: 0 K/UL
NRBC BLD-RTO: 0 /100 WBC (ref 0–0.2)
PH UR STRIP.AUTO: 5 [PH] (ref 5–8)
PLATELET # BLD AUTO: 510 K/UL (ref 164–446)
PMV BLD AUTO: 8.8 FL (ref 9–12.9)
POTASSIUM SERPL-SCNC: 4.3 MMOL/L (ref 3.6–5.5)
PROT SERPL-MCNC: 7.4 G/DL (ref 6–8.2)
PROT UR QL STRIP: NEGATIVE MG/DL
RBC # BLD AUTO: 5.11 M/UL (ref 4.2–5.4)
RBC UR QL AUTO: NEGATIVE
SODIUM SERPL-SCNC: 141 MMOL/L (ref 135–145)
SP GR UR STRIP.AUTO: 1.03
UROBILINOGEN UR STRIP.AUTO-MCNC: 0.2 MG/DL
WBC # BLD AUTO: 10 K/UL (ref 4.8–10.8)

## 2024-10-18 PROCEDURE — A9270 NON-COVERED ITEM OR SERVICE: HCPCS | Performed by: STUDENT IN AN ORGANIZED HEALTH CARE EDUCATION/TRAINING PROGRAM

## 2024-10-18 PROCEDURE — 99215 OFFICE O/P EST HI 40 MIN: CPT

## 2024-10-18 PROCEDURE — 99284 EMERGENCY DEPT VISIT MOD MDM: CPT

## 2024-10-18 PROCEDURE — 700102 HCHG RX REV CODE 250 W/ 637 OVERRIDE(OP): Performed by: STUDENT IN AN ORGANIZED HEALTH CARE EDUCATION/TRAINING PROGRAM

## 2024-10-18 PROCEDURE — 36415 COLL VENOUS BLD VENIPUNCTURE: CPT

## 2024-10-18 PROCEDURE — 74177 CT ABD & PELVIS W/CONTRAST: CPT

## 2024-10-18 PROCEDURE — 80053 COMPREHEN METABOLIC PANEL: CPT

## 2024-10-18 PROCEDURE — 700117 HCHG RX CONTRAST REV CODE 255: Performed by: STUDENT IN AN ORGANIZED HEALTH CARE EDUCATION/TRAINING PROGRAM

## 2024-10-18 PROCEDURE — 81003 URINALYSIS AUTO W/O SCOPE: CPT

## 2024-10-18 PROCEDURE — 83690 ASSAY OF LIPASE: CPT

## 2024-10-18 PROCEDURE — 3075F SYST BP GE 130 - 139MM HG: CPT

## 2024-10-18 PROCEDURE — 3079F DIAST BP 80-89 MM HG: CPT

## 2024-10-18 PROCEDURE — 85025 COMPLETE CBC W/AUTO DIFF WBC: CPT

## 2024-10-18 RX ADMIN — IOHEXOL 100 ML: 350 INJECTION, SOLUTION INTRAVENOUS at 22:45

## 2024-10-18 RX ADMIN — AMOXICILLIN AND CLAVULANATE POTASSIUM 1 TABLET: 875; 125 TABLET, FILM COATED ORAL at 23:43

## 2024-10-18 ASSESSMENT — ENCOUNTER SYMPTOMS
BLOOD IN STOOL: 1
VOMITING: 0
FEVER: 0
NAUSEA: 0
DIARRHEA: 1
ABDOMINAL PAIN: 1

## 2024-10-18 ASSESSMENT — FIBROSIS 4 INDEX
FIB4 SCORE: 0.52
FIB4 SCORE: 0.52

## 2024-11-07 ENCOUNTER — APPOINTMENT (OUTPATIENT)
Dept: MEDICAL GROUP | Facility: MEDICAL CENTER | Age: 54
End: 2024-11-07
Payer: COMMERCIAL

## 2024-11-12 ENCOUNTER — APPOINTMENT (OUTPATIENT)
Dept: MEDICAL GROUP | Facility: MEDICAL CENTER | Age: 54
End: 2024-11-12
Payer: COMMERCIAL

## 2024-11-13 ENCOUNTER — OFFICE VISIT (OUTPATIENT)
Dept: MEDICAL GROUP | Facility: MEDICAL CENTER | Age: 54
End: 2024-11-13
Payer: COMMERCIAL

## 2024-11-13 VITALS
BODY MASS INDEX: 32.49 KG/M2 | OXYGEN SATURATION: 97 % | HEART RATE: 68 BPM | TEMPERATURE: 97.6 F | SYSTOLIC BLOOD PRESSURE: 124 MMHG | DIASTOLIC BLOOD PRESSURE: 72 MMHG | WEIGHT: 195 LBS | HEIGHT: 65 IN | RESPIRATION RATE: 14 BRPM

## 2024-11-13 DIAGNOSIS — G47.00 INSOMNIA, UNSPECIFIED TYPE: ICD-10-CM

## 2024-11-13 DIAGNOSIS — F41.9 ANXIETY: ICD-10-CM

## 2024-11-13 DIAGNOSIS — E11.65 TYPE 2 DIABETES MELLITUS WITH HYPERGLYCEMIA, WITHOUT LONG-TERM CURRENT USE OF INSULIN (HCC): ICD-10-CM

## 2024-11-13 DIAGNOSIS — E78.5 DYSLIPIDEMIA: ICD-10-CM

## 2024-11-13 DIAGNOSIS — E55.9 VITAMIN D INSUFFICIENCY: ICD-10-CM

## 2024-11-13 PROCEDURE — 3078F DIAST BP <80 MM HG: CPT | Performed by: NURSE PRACTITIONER

## 2024-11-13 PROCEDURE — 99214 OFFICE O/P EST MOD 30 MIN: CPT | Performed by: NURSE PRACTITIONER

## 2024-11-13 PROCEDURE — 3074F SYST BP LT 130 MM HG: CPT | Performed by: NURSE PRACTITIONER

## 2024-11-13 RX ORDER — CITALOPRAM HYDROBROMIDE 10 MG/1
10 TABLET ORAL
Qty: 90 TABLET | Refills: 3 | Status: SHIPPED | OUTPATIENT
Start: 2024-11-13

## 2024-11-13 RX ORDER — ESZOPICLONE 2 MG/1
2 TABLET, FILM COATED ORAL NIGHTLY
Qty: 30 TABLET | Refills: 2 | Status: SHIPPED | OUTPATIENT
Start: 2024-11-13 | End: 2025-02-11

## 2024-11-13 RX ORDER — DIAZEPAM 5 MG/1
5 TABLET ORAL NIGHTLY PRN
Qty: 30 TABLET | Refills: 0 | Status: SHIPPED | OUTPATIENT
Start: 2024-11-13 | End: 2024-12-13

## 2024-11-13 ASSESSMENT — FIBROSIS 4 INDEX: FIB4 SCORE: 0.41

## 2024-11-13 NOTE — PROGRESS NOTES
Subjective:     Alis Gonzalez is a 54 y.o. female presents to discuss:   Chief Complaint   Patient presents with    Follow-Up     Verbal consent was acquired by the patient to use [email or web address] ambient listening note generation during this visit Yes   History of Present Illness  The patient presents for evaluation of insomnia.    She attributes her insomnia to a high level of stress. In June 2024, she sought help from an urgent care center where she was prescribed diazepam. This medication proved effective, allowing her to sleep for 5 hours even when taken in half doses. She has previously tried Ambien, but it resulted in memory loss. She has not yet tried Lunesta.    She is currently raising her 12-year-old daughter on her own as she lost her  over a year ago and has been difficult transitioning.  She also briefly mentions recent conflict with her sister-in-law which is also added to her stress.  Lack of sleep is been very disruptive to her.  She is also struggling with memory issues. She has a limited social support network, with only a few friends to confide in.    She had fish poisoning recently and was evaluated at the emergency room and diagnosed with acute colitis.  Symptoms have resolved at this time.  She has an upcoming appointment with GI as well.          ROS: : see above      Current Outpatient Medications:     citalopram (CELEXA) 10 MG tablet, Take 1 Tablet by mouth at bedtime., Disp: 90 Tablet, Rfl: 3    eszopiclone (LUNESTA) 2 MG Tab, Take 1 Tablet by mouth every evening for 90 days., Disp: 30 Tablet, Rfl: 2    diazePAM (VALIUM) 5 MG Tab, Take 1 Tablet by mouth at bedtime as needed for Anxiety or Sleep for up to 30 days., Disp: 30 Tablet, Rfl: 0    NON SPECIFIED, Minoxidil 10% with latanaprost 0.01% with finasteride 0.1% with biotin 0.2%, apply twice a day to scalp, Disp: 30 Each, Rfl: 3    Semaglutide, 1 MG/DOSE, (OZEMPIC, 1 MG/DOSE,) 4 MG/3ML Solution Pen-injector, INJECT 1 MG UNDER THE SKIN  "EVERY 7 DAYS, Disp: 9 mL, Rfl: 1    lisinopril (PRINIVIL) 20 MG Tab, TAKE 1 TABLET BY MOUTH EVERY DAY, Disp: 90 Tablet, Rfl: 2    levothyroxine (SYNTHROID) 75 MCG Tab, Take 1 Tablet by mouth every morning on an empty stomach., Disp: 90 Tablet, Rfl: 3    vitamin D2, Ergocalciferol, (DRISDOL) 1.25 MG (76560 UT) Cap capsule, Take 1 Capsule by mouth every 7 days. (Patient not taking: Reported on 10/18/2024), Disp: 12 Capsule, Rfl: 1    triamcinolone (KENALOG) 0.025 % ointment, , Disp: , Rfl:     albuterol (ACCUNEB) 0.63 MG/3ML nebulizer solution, Take 3 mL by nebulization every four hours as needed for Shortness of Breath., Disp: 3 mL, Rfl: 1    DUPIXENT 300 MG/2ML Solution Prefilled Syringe injection, , Disp: , Rfl:     Allergies   Allergen Reactions    Azithromycin Rash     All over body    Ciprofloxacin Rash     All over body       Objective:     Vitals: /72   Pulse 68   Temp 36.4 °C (97.6 °F)   Resp 14   Ht 1.651 m (5' 5\")   Wt 88.5 kg (195 lb)   SpO2 97%   BMI 32.45 kg/m²    General: Alert, pleasant, NAD  HEENT: Normocephalic.  Neck supple.   Respiratory: no distress, no audible wheezing, RR -WNL  Skin: Warm, dry, no rashes.  Extremities: No leg edema. No discoloration  Neurological: No tremors  Psych:  Affect/mood is normal, judgement is good, memory is intact, grooming is appropriate.      Assessment/Plan:      Assessment & Plan  1. Insomnia.  She has been experiencing insomnia due to stress and racing thoughts. She was previously prescribed diazepam, which helped her sleep.  She reports that at this time she infrequently takes Ambien as she does note memory loss as a side effect.  We discussed trial of Lunesta at a low dose however may use low-dose diazepam infrequently if needed as this has been proven to be effective for her.  She denied having any side effects or ADRs.  We also discussed starting on Celexa to help with her anxiety as well as encouraging her to pursue counseling as I do believe " she would benefit greatly from counseling.     2. Anxiety.  Celexa was prescribed to help manage anxiety. She was advised to take it daily, preferably at night, and to start with a half dose of 5 mg if needed. She was informed that the first 2 weeks might increase anxiety slightly. Counseling was recommended to help manage stress and anxiety.    3. Lipid Panel.  A lipid panel was ordered as part of routine health maintenance.      1. Insomnia, unspecified type  - eszopiclone (LUNESTA) 2 MG Tab; Take 1 Tablet by mouth every evening for 90 days.  Dispense: 30 Tablet; Refill: 2  - diazePAM (VALIUM) 5 MG Tab; Take 1 Tablet by mouth at bedtime as needed for Anxiety or Sleep for up to 30 days.  Dispense: 30 Tablet; Refill: 0    2. Type 2 diabetes mellitus with hyperglycemia, without long-term current use of insulin (HCC)  Chronic.  Update A1c placed on labs.  Continue Ozempic.  - Diabetic Monofilament Lower Extremity Exam  - HEMOGLOBIN A1C; Future    3. Vitamin D insufficiency  - VITAMIN D,25 HYDROXY (DEFICIENCY); Future    4. Dyslipidemia  Historically has declined statins.  Update lipid panel.  - Lipid Profile; Future  - Lipoprotein (a); Future    5. Anxiety  - citalopram (CELEXA) 10 MG tablet; Take 1 Tablet by mouth at bedtime.  Dispense: 90 Tablet; Refill: 3  - diazePAM (VALIUM) 5 MG Tab; Take 1 Tablet by mouth at bedtime as needed for Anxiety or Sleep for up to 30 days.  Dispense: 30 Tablet; Refill: 0       Return in about 3 months (around 2/13/2025).    {I have placed the above orders and discussed them with an approved delegating provider. The MA is performing the below orders under the direction of Dr. Martín ROSAS

## 2024-12-06 RX ORDER — SEMAGLUTIDE 1.34 MG/ML
1 INJECTION, SOLUTION SUBCUTANEOUS
Qty: 9 ML | Refills: 1 | Status: SHIPPED | OUTPATIENT
Start: 2024-12-06

## 2024-12-11 ENCOUNTER — HOSPITAL ENCOUNTER (OUTPATIENT)
Dept: LAB | Facility: MEDICAL CENTER | Age: 54
End: 2024-12-11
Attending: NURSE PRACTITIONER
Payer: COMMERCIAL

## 2024-12-11 DIAGNOSIS — E11.65 TYPE 2 DIABETES MELLITUS WITH HYPERGLYCEMIA, WITHOUT LONG-TERM CURRENT USE OF INSULIN (HCC): ICD-10-CM

## 2024-12-11 DIAGNOSIS — E55.9 VITAMIN D INSUFFICIENCY: ICD-10-CM

## 2024-12-11 DIAGNOSIS — E78.5 DYSLIPIDEMIA: ICD-10-CM

## 2024-12-11 LAB
25(OH)D3 SERPL-MCNC: 43 NG/ML (ref 30–100)
CHOLEST SERPL-MCNC: 201 MG/DL (ref 100–199)
EST. AVERAGE GLUCOSE BLD GHB EST-MCNC: 163 MG/DL
FASTING STATUS PATIENT QL REPORTED: NORMAL
HBA1C MFR BLD: 7.3 % (ref 4–5.6)
HDLC SERPL-MCNC: 44 MG/DL
LDLC SERPL CALC-MCNC: 138 MG/DL
TRIGL SERPL-MCNC: 94 MG/DL (ref 0–149)

## 2024-12-11 PROCEDURE — 82306 VITAMIN D 25 HYDROXY: CPT

## 2024-12-11 PROCEDURE — 83695 ASSAY OF LIPOPROTEIN(A): CPT

## 2024-12-11 PROCEDURE — 36415 COLL VENOUS BLD VENIPUNCTURE: CPT

## 2024-12-11 PROCEDURE — 83036 HEMOGLOBIN GLYCOSYLATED A1C: CPT

## 2024-12-11 PROCEDURE — 80061 LIPID PANEL: CPT

## 2024-12-13 LAB — LPA SERPL-MCNC: 12 MG/DL

## 2024-12-17 ENCOUNTER — APPOINTMENT (OUTPATIENT)
Dept: MEDICAL GROUP | Facility: MEDICAL CENTER | Age: 54
End: 2024-12-17
Payer: COMMERCIAL

## 2025-01-07 ENCOUNTER — APPOINTMENT (OUTPATIENT)
Dept: MEDICAL GROUP | Facility: MEDICAL CENTER | Age: 55
End: 2025-01-07
Payer: COMMERCIAL

## 2025-01-23 NOTE — TELEPHONE ENCOUNTER
Received request via: Pharmacy    Was the patient seen in the last year in this department? Yes    Does the patient have an active prescription (recently filled or refills available) for medication(s) requested? No    Pharmacy Name:  [facility]/pharmacy #[id number] - [place], [place] - [address] [place] [facility].     Does the patient have shelter Plus and need 100-day supply? (This applies to ALL medications) Patient does not have SCP

## 2025-01-24 RX ORDER — LISINOPRIL 20 MG/1
20 TABLET ORAL DAILY
Qty: 90 TABLET | Refills: 2 | Status: SHIPPED | OUTPATIENT
Start: 2025-01-24

## 2025-02-03 DIAGNOSIS — L65.9 HAIR LOSS: ICD-10-CM

## 2025-02-03 NOTE — TELEPHONE ENCOUNTER
Received request via: Patient    Was the patient seen in the last year in this department? Yes    Does the patient have an active prescription (recently filled or refills available) for medication(s) requested? No    Pharmacy Name: [facility]  Pt states that she has been having issues getting her refill with pharmacy. They tell her they have sent a request and we don't receive them.

## 2025-02-04 ENCOUNTER — TELEPHONE (OUTPATIENT)
Dept: HEALTH INFORMATION MANAGEMENT | Facility: OTHER | Age: 55
End: 2025-02-04
Payer: COMMERCIAL

## 2025-02-21 DIAGNOSIS — F41.9 ANXIETY: ICD-10-CM

## 2025-02-21 DIAGNOSIS — G47.00 INSOMNIA, UNSPECIFIED TYPE: ICD-10-CM

## 2025-02-21 RX ORDER — DIAZEPAM 5 MG/1
5 TABLET ORAL NIGHTLY PRN
Qty: 30 TABLET | Refills: 0 | Status: SHIPPED | OUTPATIENT
Start: 2025-02-21 | End: 2025-03-23

## 2025-03-12 ENCOUNTER — OFFICE VISIT (OUTPATIENT)
Dept: MEDICAL GROUP | Facility: MEDICAL CENTER | Age: 55
End: 2025-03-12
Payer: COMMERCIAL

## 2025-03-12 VITALS
HEIGHT: 65 IN | TEMPERATURE: 97.6 F | DIASTOLIC BLOOD PRESSURE: 70 MMHG | RESPIRATION RATE: 16 BRPM | BODY MASS INDEX: 33.32 KG/M2 | OXYGEN SATURATION: 97 % | HEART RATE: 62 BPM | WEIGHT: 200 LBS | SYSTOLIC BLOOD PRESSURE: 122 MMHG

## 2025-03-12 DIAGNOSIS — E03.8 OTHER SPECIFIED HYPOTHYROIDISM: ICD-10-CM

## 2025-03-12 DIAGNOSIS — D72.10 EOSINOPHILIA, UNSPECIFIED TYPE: Chronic | ICD-10-CM

## 2025-03-12 DIAGNOSIS — E55.9 VITAMIN D INSUFFICIENCY: ICD-10-CM

## 2025-03-12 DIAGNOSIS — E11.65 TYPE 2 DIABETES MELLITUS WITH HYPERGLYCEMIA, WITHOUT LONG-TERM CURRENT USE OF INSULIN (HCC): ICD-10-CM

## 2025-03-12 DIAGNOSIS — I10 ESSENTIAL HYPERTENSION: Chronic | ICD-10-CM

## 2025-03-12 DIAGNOSIS — R21 RASH AND NONSPECIFIC SKIN ERUPTION: ICD-10-CM

## 2025-03-12 DIAGNOSIS — E78.5 DYSLIPIDEMIA: Chronic | ICD-10-CM

## 2025-03-12 PROCEDURE — 99214 OFFICE O/P EST MOD 30 MIN: CPT | Performed by: NURSE PRACTITIONER

## 2025-03-12 PROCEDURE — 3078F DIAST BP <80 MM HG: CPT | Performed by: NURSE PRACTITIONER

## 2025-03-12 PROCEDURE — 3074F SYST BP LT 130 MM HG: CPT | Performed by: NURSE PRACTITIONER

## 2025-03-12 RX ORDER — SEMAGLUTIDE 2.68 MG/ML
2 INJECTION, SOLUTION SUBCUTANEOUS
Qty: 3 ML | Refills: 3 | Status: SHIPPED | OUTPATIENT
Start: 2025-03-12

## 2025-03-12 RX ORDER — LEVOTHYROXINE SODIUM 75 UG/1
75 TABLET ORAL
Qty: 90 TABLET | Refills: 3 | Status: SHIPPED | OUTPATIENT
Start: 2025-03-12

## 2025-03-12 ASSESSMENT — PATIENT HEALTH QUESTIONNAIRE - PHQ9: CLINICAL INTERPRETATION OF PHQ2 SCORE: 0

## 2025-03-12 ASSESSMENT — FIBROSIS 4 INDEX: FIB4 SCORE: 0.41

## 2025-03-12 NOTE — PROGRESS NOTES
Subjective:     Alis Gonzalez is a 54 y.o. female presents to discuss:     Chief Complaint   Patient presents with    Follow-Up     Verbal consent was acquired by the patient to use [employer] ambient listening note generation during this visit Yes   History of Present Illness  The patient presents for a follow-up visit.    Follow-up last office visit.  Patient notes that the Valium was helpful and as needed moments of increased stress and anxiety.  She was somewhat hesitant to start on the Celexa for concern regarding any interaction with the Valium.  We have discussed this and would still recommend for her to start on low-dose Celexa.  She reports no adverse effects from the Valium. She has discontinued Lunesta due to its depressive effects and previously had an unsatisfactory experience with Ambien.  She will be initiating counseling sessions soon.    She reports the sudden appearance of 2-3 tiny  blisters on her finger, which she suspects may be herpes or shingles. The blisters are localized to right pointer finger dorsal aspect.  Denies any fevers, trauma to the area.  S She mentions attending a [employer] class the previous night and expresses concern about potential exposure to germs. She has no history of sexually transmitted diseases.    She is currently on Ozempic 1 mg and inquires about the possibility of increasing the dose to 2 mg. She has 2 boxes of Ozempic remaining in her refrigerator. She reports that metformin was ineffective for her and caused significant diarrhea. She has not had an eye examination in over a year.    She has been on thyroid medication for an extended period and expresses a desire to discontinue it. She has previously consulted an endocrinologist who advised against abrupt cessation of the medication. She is currently on a dose of 0.75 mg.    She is due for a mammogram.    MEDICATIONS  Current: Valium, Celexa, Ozempic  Discontinued: Lunesta, Ambien  Past: Metformin        ROS:  : see above      Current Outpatient Medications:     levothyroxine (SYNTHROID) 75 MCG Tab, Take 1 Tablet by mouth every morning on an empty stomach., Disp: 90 Tablet, Rfl: 3    Semaglutide, 2 MG/DOSE, (OZEMPIC, 2 MG/DOSE,) 8 MG/3ML Solution Pen-injector, Inject 2 mg under the skin every 7 days., Disp: 3 mL, Rfl: 3    doxycycline (VIBRAMYCIN) 100 MG Tab, Take 1 Tablet by mouth 2 times a day for 7 days., Disp: 14 Tablet, Rfl: 0    valacyclovir (VALTREX) 1 GM Tab, Take 1 Tablet by mouth 3 times a day for 7 days., Disp: 21 Tablet, Rfl: 0    gabapentin (NEURONTIN) 100 MG Cap, Take 1 Capsule by mouth 2 times a day as needed (arm pain)., Disp: 30 Capsule, Rfl: 0    diazePAM (VALIUM) 5 MG Tab, Take 1 Tablet by mouth at bedtime as needed for Anxiety or Sleep for up to 30 days., Disp: 30 Tablet, Rfl: 0    NON SPECIFIED, Minoxidil 10% with latanaprost 0.01% with finasteride 0.1% with biotin 0.2%, apply twice a day to scalp, Disp: 30 Each, Rfl: 3    lisinopril (PRINIVIL) 20 MG Tab, TAKE 1 TABLET BY MOUTH EVERY DAY, Disp: 90 Tablet, Rfl: 2    Semaglutide, 1 MG/DOSE, (OZEMPIC, 1 MG/DOSE,) 4 MG/3ML Solution Pen-injector, Inject 1 mg under the skin every 7 days., Disp: 9 mL, Rfl: 1    citalopram (CELEXA) 10 MG tablet, Take 1 Tablet by mouth at bedtime. (Patient not taking: Reported on 3/15/2025), Disp: 90 Tablet, Rfl: 3    vitamin D2, Ergocalciferol, (DRISDOL) 1.25 MG (85728 UT) Cap capsule, Take 1 Capsule by mouth every 7 days., Disp: 12 Capsule, Rfl: 1    triamcinolone (KENALOG) 0.025 % ointment, , Disp: , Rfl:     albuterol (ACCUNEB) 0.63 MG/3ML nebulizer solution, Take 3 mL by nebulization every four hours as needed for Shortness of Breath. (Patient not taking: Reported on 3/15/2025), Disp: 3 mL, Rfl: 1    DUPIXENT 300 MG/2ML Solution Prefilled Syringe injection, , Disp: , Rfl:     Allergies   Allergen Reactions    Azithromycin Rash     All over body    Ciprofloxacin Rash     All over body       Objective:   Vitals: /70   " Pulse 62   Temp 36.4 °C (97.6 °F) (Temporal)   Resp 16   Ht 1.651 m (5' 5\")   Wt 90.7 kg (200 lb)   SpO2 97%   BMI 33.28 kg/m²    General: Alert, pleasant, NAD  HEENT: Normocephalic.  Neck supple.   Respiratory: no distress, no audible wheezing, RR -WNL  Skin: Warm, dry, no rashes.  Extremities: No leg edema. No discoloration  Neurological: No tremors  Psych:  Affect/mood is normal, judgement is good, memory is intact, grooming is appropriate.  Assessment/Plan:      1. Other specified hypothyroidism  - levothyroxine (SYNTHROID) 75 MCG Tab; Take 1 Tablet by mouth every morning on an empty stomach.  Dispense: 90 Tablet; Refill: 3  - TSH; Future  - FREE THYROXINE; Future    2. Type 2 diabetes mellitus with hyperglycemia, without long-term current use of insulin (HCC)  - HEMOGLOBIN A1C; Future  - Semaglutide, 2 MG/DOSE, (OZEMPIC, 2 MG/DOSE,) 8 MG/3ML Solution Pen-injector; Inject 2 mg under the skin every 7 days.  Dispense: 3 mL; Refill: 3  - MICROALBUMIN CREAT RATIO URINE; Future    3. Vitamin D insufficiency  - VITAMIN D,25 HYDROXY (DEFICIENCY); Future    4. Dyslipidemia  - Comp Metabolic Panel; Future  - Lipid Profile; Future  - CRP QUANTITIVE (NON-CARDIAC); Future    5. Essential hypertension  - Comp Metabolic Panel; Future  - CBC WITHOUT DIFFERENTIAL; Future    6. Eosinophilia, unspecified type  - CBC WITHOUT DIFFERENTIAL; Future    7. Rash and nonspecific skin eruption     [facility] Form    Diagnosis to address: J45.40 - Moderate persistent asthma without complication  Assessment and plan: Chronic, stable.  No recent exacerbations.  Follow-up at least annually.  Diagnosis: E11.65 - Type 2 diabetes mellitus with hyperglycemia, without long-term current use of insulin (HCC)  Assessment and plan: Chronic, stable.  Continue Ozempic-increase to 2 mg follow-up at least annually.  Last edited 03/17/25 06:32 PDT by RALPH Burt        Assessment & Plan  1. Anxiety  - Experiencing stress due to " some family members.  She notes that the Valium as needed was helpful.    - Has not started Celexa due to concerns about interactions with Valium  - Initiated counseling sessions  - Considering joining her daughter in wellness counseling  - Will start Celexa daily at a low dose  - Advised to discontinue Lunesta due to adverse effects    2. Dyshidrotic eczema-clinical suspicion-although difficult to diagnose at this time as there is 2 small fine vesicles on finger however difficult to appreciate, no erythema.  It does not appear to be herpetic at this time however may be too early in presentation.  - Presents with painful blisters on her finger  - Advised to apply topical triamcinolone  - Avoid picking or bursting the blisters  - Seek immediate medical attention at urgent care if condition worsens  - Topical antibiotic will be prescribed if necessary    3. Diabetes Mellitus  - Currently on Ozempic 1 mg  - Inquires about increasing dose to 2 mg  - Has 2 boxes of Ozempic remaining  - Reports metformin was ineffective and caused significant diarrhea  - Has not had an eye examination in over a year  - Will increase Ozempic dosage to 2 mg  - Prior authorization may be required for dosage adjustment  - Advised to schedule an eye examination    4. Hypothyroidism  - Currently on a dose of 0.75 mg  - Thyroid levels will be rechecked to determine if adjustments are needed    5. Health Maintenance  - Due for a mammogram and will schedule it  - Vitamin D3 level test will be conducted    Return in about 4 months (around 7/12/2025).    {I have placed the above orders and discussed them with an approved delegating provider. The MA is performing the below orders under the direction of Dr. Martín ROSAS    Health maintenance:    General Recommendations:   Smoking: recommend complete avoidance of all tobacco products  Alcohol: recommend limiting consumption  Physical Activity: goal is 30 min of moderate  activity 5 times a week  Weight Management and Nutrition: high vegetable, high protein diet like the Mediterranean diet, portion control, avoid excessive sugars, Low Glycemic Index foods, adequate hydration, sleep.

## 2025-03-12 NOTE — LETTER
Request for Medical Records    Patient Name: Alis Gonzalez    : 1970      Dear Doctor: Liam    The above named patient receives primary care at the [facility] by RALPH Burt.  The patient informs us that you are her eye care Provider.    Please fax a copy of the most recent eye exam to [phone] or answer the  questions below and fax this sheet back to us at the above number.  Attached is a signed Release of Information.      Date of last eye exam: _____________    Retinal eye exam summary:        Please select the choice(s) that apply.    ____ No diabetic retinopathy    ____    Diabetic retinopathy present      Printed Name and Credentials: __________________________________    Signature of Eye Care Provider: _________________________________    We appreciate your assistance and collaboration in providing efficient patient care!    Kindest Regards,    [facility] [address] [place] [address] [phone]

## 2025-03-12 NOTE — LETTER
[facility]  YAYO Burt.P.R.N.  [address] [facility] [address]  [place] [address]  Fax: [phone]   Authorization for Release/Disclosure of   Protected Health Information   Name: VOLODYMYR LIM : 1970 SSN: [id number]   Address: [address] [place] [address] [address] [place] [address] Phone:    There are no phone numbers on file.   I authorize the entity listed below to release/disclose the PHI below to:   [facility]/Comfort Grijalva A.P.R.N. and [employer] Grijalva A.P.R.N.   Provider or Entity Name:  [facility] [facility] [place] [facility] )   Address   City, State, [facility]   Phone:      Fax:     Reason for request: continuity of care   Information to be released:    [  ] LAST COLONOSCOPY,  including any PATH REPORT and follow-up   [  ] LAST DEXA  [  ] LAST MAMMOGRAM  [  ] LAST PAP  [  ] LAST LABS [ x ] RETINA EXAM REPORT  [  ] IMMUNIZATION RECORDS  [  ] Release all info      [  ] Check here and initial the line next to each item to release ALL health information INCLUDING  _____ Care and treatment for drug and / or alcohol abuse  _____ HIV testing, infection status, or AIDS  _____ Genetic Testing    DATES OF SERVICE OR TIME PERIOD TO BE DISCLOSED: _____________  I understand and acknowledge that:  * This Authorization may be revoked at any time by you in writing, except if your health information has already been used or disclosed.  * Your health information that will be used or disclosed as a result of you signing this authorization could be re-disclosed by the recipient. If this occurs, your re-disclosed health information may no longer be protected by State or Federal laws.  * You may refuse to sign this Authorization. Your refusal will not affect your ability to obtain treatment.  * This Authorization becomes effective upon signing and will  on (date) __________.      If no date is indicated, this Authorization will  one (1) year from the signature date.    Name: Volodymyr Concepcion  Held  Signature: Date:   3/12/2025     PLEASE FAX REQUESTED RECORDS BACK TO: [phone]

## 2025-03-13 ENCOUNTER — OFFICE VISIT (OUTPATIENT)
Dept: URGENT CARE | Facility: PHYSICIAN GROUP | Age: 55
End: 2025-03-13
Payer: COMMERCIAL

## 2025-03-13 VITALS
WEIGHT: 159.39 LBS | OXYGEN SATURATION: 95 % | HEART RATE: 82 BPM | BODY MASS INDEX: 26.56 KG/M2 | RESPIRATION RATE: 14 BRPM | SYSTOLIC BLOOD PRESSURE: 146 MMHG | TEMPERATURE: 97.9 F | HEIGHT: 65 IN | DIASTOLIC BLOOD PRESSURE: 92 MMHG

## 2025-03-13 DIAGNOSIS — M79.601 RIGHT ARM PAIN: ICD-10-CM

## 2025-03-13 DIAGNOSIS — B02.9 HERPES ZOSTER WITHOUT COMPLICATION: ICD-10-CM

## 2025-03-13 PROCEDURE — 3077F SYST BP >= 140 MM HG: CPT | Performed by: PHYSICIAN ASSISTANT

## 2025-03-13 PROCEDURE — 3080F DIAST BP >= 90 MM HG: CPT | Performed by: PHYSICIAN ASSISTANT

## 2025-03-13 PROCEDURE — 99214 OFFICE O/P EST MOD 30 MIN: CPT | Performed by: PHYSICIAN ASSISTANT

## 2025-03-13 PROCEDURE — 93000 ELECTROCARDIOGRAM COMPLETE: CPT | Performed by: PHYSICIAN ASSISTANT

## 2025-03-13 RX ORDER — VALACYCLOVIR HYDROCHLORIDE 1 G/1
1000 TABLET, FILM COATED ORAL 3 TIMES DAILY
Qty: 21 TABLET | Refills: 0 | Status: SHIPPED | OUTPATIENT
Start: 2025-03-13 | End: 2025-03-20

## 2025-03-13 RX ORDER — GABAPENTIN 100 MG/1
100 CAPSULE ORAL 2 TIMES DAILY PRN
Qty: 30 CAPSULE | Refills: 0 | Status: SHIPPED | OUTPATIENT
Start: 2025-03-13

## 2025-03-13 ASSESSMENT — FIBROSIS 4 INDEX: FIB4 SCORE: 0.41

## 2025-03-13 ASSESSMENT — ENCOUNTER SYMPTOMS
FEVER: 0
SHORTNESS OF BREATH: 0
PALPITATIONS: 0
CHILLS: 0

## 2025-03-13 NOTE — PROGRESS NOTES
Subjective:   Alis Gonzalez is a 54 y.o. female who presents today with   Chief Complaint   Patient presents with    Finger Pain     R index finger pain radiating up entire arm into arm pit, R side chest, stated last night.        HPI    Patient states she had small bumps to her right index finger that started yesterday.  The got slightly worse today.  No recent injury or trauma or bite.  Patient is describing pain radiating from the right index finger up to the arm towards her shoulder.    PMH:  has a past medical history of Alopecia (2015), Asthma, Daytime sleepiness, Fatigue, Insomnia, and Thyroid disease.    She has no past medical history of Chills, Fever, Gasping for breath, Morning headache, Snoring, or Weight loss.  MEDS:   Current Outpatient Medications:     valacyclovir (VALTREX) 1 GM Tab, Take 1 Tablet by mouth 3 times a day for 7 days., Disp: 21 Tablet, Rfl: 0    gabapentin (NEURONTIN) 100 MG Cap, Take 1 Capsule by mouth 2 times a day as needed (arm pain)., Disp: 30 Capsule, Rfl: 0    levothyroxine (SYNTHROID) 75 MCG Tab, Take 1 Tablet by mouth every morning on an empty stomach., Disp: 90 Tablet, Rfl: 3    Semaglutide, 2 MG/DOSE, (OZEMPIC, 2 MG/DOSE,) 8 MG/3ML Solution Pen-injector, Inject 2 mg under the skin every 7 days., Disp: 3 mL, Rfl: 3    diazePAM (VALIUM) 5 MG Tab, Take 1 Tablet by mouth at bedtime as needed for Anxiety or Sleep for up to 30 days., Disp: 30 Tablet, Rfl: 0    NON SPECIFIED, Minoxidil 10% with latanaprost 0.01% with finasteride 0.1% with biotin 0.2%, apply twice a day to scalp, Disp: 30 Each, Rfl: 3    lisinopril (PRINIVIL) 20 MG Tab, TAKE 1 TABLET BY MOUTH EVERY DAY, Disp: 90 Tablet, Rfl: 2    Semaglutide, 1 MG/DOSE, (OZEMPIC, 1 MG/DOSE,) 4 MG/3ML Solution Pen-injector, Inject 1 mg under the skin every 7 days., Disp: 9 mL, Rfl: 1    DUPIXENT 300 MG/2ML Solution Prefilled Syringe injection, , Disp: , Rfl:     citalopram (CELEXA) 10 MG tablet, Take 1 Tablet by mouth at bedtime.  "(Patient not taking: Reported on 3/13/2025), Disp: 90 Tablet, Rfl: 3    vitamin D2, Ergocalciferol, (DRISDOL) 1.25 MG (70304 UT) Cap capsule, Take 1 Capsule by mouth every 7 days. (Patient not taking: Reported on 3/13/2025), Disp: 12 Capsule, Rfl: 1    triamcinolone (KENALOG) 0.025 % ointment, , Disp: , Rfl:     albuterol (ACCUNEB) 0.63 MG/3ML nebulizer solution, Take 3 mL by nebulization every four hours as needed for Shortness of Breath. (Patient not taking: Reported on 3/13/2025), Disp: 3 mL, Rfl: 1  ALLERGIES:   Allergies   Allergen Reactions    Azithromycin Rash     All over body    Ciprofloxacin Rash     All over body     SURGHX: History reviewed. No pertinent surgical history.  SOCHX:  reports that she has never smoked. She has never used smokeless tobacco. She reports that she does not currently use alcohol. She reports that she does not use drugs.  FH: Reviewed with patient, not pertinent to this visit.     Review of Systems   Constitutional:  Negative for chills and fever.   Respiratory:  Negative for shortness of breath.    Cardiovascular:  Negative for chest pain and palpitations.   Skin:  Positive for rash.      Objective:   BP (!) 146/92 (BP Location: Left arm, Patient Position: Sitting, BP Cuff Size: Adult long)   Pulse 82   Temp 36.6 °C (97.9 °F) (Temporal)   Resp 14   Ht 1.651 m (5' 5\")   Wt 72.3 kg (159 lb 6.3 oz)   SpO2 95%   BMI 26.52 kg/m²   Physical Exam  Vitals and nursing note reviewed.   Constitutional:       General: She is not in acute distress.     Appearance: Normal appearance. She is well-developed. She is not ill-appearing or toxic-appearing.   HENT:      Head: Normocephalic and atraumatic.      Right Ear: Hearing normal.      Left Ear: Hearing normal.   Cardiovascular:      Rate and Rhythm: Normal rate and regular rhythm.      Heart sounds: Normal heart sounds.   Pulmonary:      Effort: Pulmonary effort is normal.      Breath sounds: Normal breath sounds.   Musculoskeletal:     "    Hands:       Comments: Vesicular clustered lesions to the PIP of the right index finger dorsally.  No proximal streaking or erythema otherwise.  No open wounds.  No necrosis.   Skin:     General: Skin is warm and dry.      Comments: No axillary rash or lesions. No open wounds   Neurological:      Mental Status: She is alert.      Coordination: Coordination normal.   Psychiatric:         Mood and Affect: Mood normal.       EKG  Normal sinus rhythm with rate of 78.  No significant acute ST wave abnormalities appreciated this time.  No other concerning etiologies noted on my read today.  No significant changes when compared to EKG from August 2020.    Assessment/Plan:   Assessment    1. Right arm pain  - EKG - Clinic Performed    2. Herpes zoster without complication  - valacyclovir (VALTREX) 1 GM Tab; Take 1 Tablet by mouth 3 times a day for 7 days.  Dispense: 21 Tablet; Refill: 0  - gabapentin (NEURONTIN) 100 MG Cap; Take 1 Capsule by mouth 2 times a day as needed (arm pain).  Dispense: 30 Capsule; Refill: 0    Suspect shingles as cause of bumps/rash to the right index finger with radiating pain up her arm.  Would recommend EKG.  Discussed with patient we cannot completely rule out cardiac etiology and she would need to go to the ER for higher level of evaluation regarding this with any new or worsening symptoms.  No signs of bacterial infection at this time.  Discussed red flag signs to monitor for and patient understands.  She understands not to take any Valium when taking the gabapentin and she states she only takes that intermittently as needed anyway.  She understands possible side effects of medication.    Differential diagnosis, natural history, supportive care, and indications for immediate follow-up discussed.   Patient given instructions and understanding of medications and treatment.    If not improving in 3-5 days, F/U with PCP or return to UC if symptoms worsen.    Patient agreeable to  plan.      Please note that this dictation was created using voice recognition software. I have made every reasonable attempt to correct obvious errors, but I expect that there are errors of grammar and possibly content that I did not discover before finalizing the note.    Carloz Jane PA-C

## 2025-03-15 ENCOUNTER — OFFICE VISIT (OUTPATIENT)
Dept: URGENT CARE | Facility: PHYSICIAN GROUP | Age: 55
End: 2025-03-15
Payer: COMMERCIAL

## 2025-03-15 VITALS
SYSTOLIC BLOOD PRESSURE: 136 MMHG | DIASTOLIC BLOOD PRESSURE: 84 MMHG | BODY MASS INDEX: 33.52 KG/M2 | TEMPERATURE: 98.1 F | HEIGHT: 65 IN | OXYGEN SATURATION: 95 % | WEIGHT: 201.17 LBS | HEART RATE: 87 BPM | RESPIRATION RATE: 16 BRPM

## 2025-03-15 DIAGNOSIS — M79.644 PAIN OF FINGER OF RIGHT HAND: ICD-10-CM

## 2025-03-15 DIAGNOSIS — L08.9 SKIN INFECTION: ICD-10-CM

## 2025-03-15 PROCEDURE — 99213 OFFICE O/P EST LOW 20 MIN: CPT

## 2025-03-15 PROCEDURE — 3079F DIAST BP 80-89 MM HG: CPT

## 2025-03-15 PROCEDURE — 3075F SYST BP GE 130 - 139MM HG: CPT

## 2025-03-15 RX ORDER — DOXYCYCLINE HYCLATE 100 MG
100 TABLET ORAL 2 TIMES DAILY
Qty: 14 TABLET | Refills: 0 | Status: SHIPPED | OUTPATIENT
Start: 2025-03-15 | End: 2025-03-17

## 2025-03-15 ASSESSMENT — FIBROSIS 4 INDEX: FIB4 SCORE: 0.41

## 2025-03-15 ASSESSMENT — ENCOUNTER SYMPTOMS: FEVER: 0

## 2025-03-15 NOTE — PROGRESS NOTES
Subjective:     CHIEF COMPLAINT  Chief Complaint   Patient presents with    Wound Check      Finger wound; Red ,swollen , right index finger ,knuckle gland swelling under arm pit ,right side        HPI  Alis Gonzalez is a very pleasant 54 y.o. female who presents with pain and swelling along the dorsum of her right index finger with pain traveling up her arm and swollen lymph nodes in her right axillary region.  She was previously seen in the urgent care 2 days ago on 3/13/2025 and was diagnosed with herpes zoster.  She has been taking valacyclovir as instructed without improvement in symptoms.  She denies any trauma to her finger or known punctures.  She has not developed any further vesicular lesions other than to the interphalangeal joint of the right index finger.  She is concerned for a possible bacterial infection.  She has not had any fevers.    REVIEW OF SYSTEMS  Review of Systems   Constitutional:  Negative for fever.       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    Fatty liver 07/03/2019    Eosinophilia 07/03/2019    Insomnia 01/08/2018    Other specified hypothyroidism 01/08/2018    Vitamin D insufficiency 01/08/2018    Obesity (BMI 30-39.9) 01/08/2018    Allergic eczema 01/08/2018    Moderate persistent asthma without complication     Alopecia 05/27/2016       SURGICAL HISTORY  patient denies any surgical history    ALLERGIES  Allergies   Allergen Reactions    Azithromycin Rash     All over body    Ciprofloxacin Rash     All over body       CURRENT MEDICATIONS  Home Medications       Reviewed by Jennifer Mahmood,  "DALIA (Physician Assistant) on 03/15/25 at 0905  Med List Status: <None>     Medication Last Dose Status   albuterol (ACCUNEB) 0.63 MG/3ML nebulizer solution Not Taking Active   citalopram (CELEXA) 10 MG tablet Not Taking Active   diazePAM (VALIUM) 5 MG Tab Taking Active   DUPIXENT 300 MG/2ML Solution Prefilled Syringe injection Taking Active   gabapentin (NEURONTIN) 100 MG Cap Taking Active   levothyroxine (SYNTHROID) 75 MCG Tab Taking Active   lisinopril (PRINIVIL) 20 MG Tab Taking Active   NON SPECIFIED Taking Active   Semaglutide, 1 MG/DOSE, (OZEMPIC, 1 MG/DOSE,) 4 MG/3ML Solution Pen-injector Taking Active   Semaglutide, 2 MG/DOSE, (OZEMPIC, 2 MG/DOSE,) 8 MG/3ML Solution Pen-injector Taking Active   triamcinolone (KENALOG) 0.025 % ointment Taking Active   valacyclovir (VALTREX) 1 GM Tab Taking Active   vitamin D2, Ergocalciferol, (DRISDOL) 1.25 MG (05258 UT) Cap capsule Taking Active                    SOCIAL HISTORY  Social History     Tobacco Use    Smoking status: Never    Smokeless tobacco: Never   Vaping Use    Vaping status: Never Used   Substance and Sexual Activity    Alcohol use: Not Currently     Comment: rarely    Drug use: No    Sexual activity: Yes     Partners: Male       FAMILY HISTORY  Family History   Problem Relation Age of Onset    Thyroid Mother         cancer-40's    Cancer Father         skin-melanoma    Hyperlipidemia Father     Cancer Maternal Aunt         breast to brain    Breast Cancer Maternal Aunt           Objective:     VITAL SIGNS: /84   Pulse 87   Temp 36.7 °C (98.1 °F) (Temporal)   Resp 16   Ht 1.651 m (5' 5\")   Wt 91.3 kg (201 lb 2.7 oz)   SpO2 95%   BMI 33.48 kg/m²     PHYSICAL EXAM  Physical Exam  Vitals reviewed.   Constitutional:       General: She is not in acute distress.     Appearance: Normal appearance. She is not ill-appearing or toxic-appearing.      Comments: Tenderness to right axillary region.   HENT:      Head: Normocephalic and atraumatic.      " Mouth/Throat:      Mouth: Mucous membranes are moist.   Eyes:      Conjunctiva/sclera: Conjunctivae normal.      Pupils: Pupils are equal, round, and reactive to light.   Cardiovascular:      Rate and Rhythm: Normal rate.   Pulmonary:      Effort: Pulmonary effort is normal. No respiratory distress.   Musculoskeletal:        Hands:       Comments: Pustular lesion to interphalangeal joint of dorsum of right index finger.  Area is tender to palpation.  Slight erythema surrounding.  No further lesions.   Skin:     General: Skin is warm and dry.   Neurological:      General: No focal deficit present.      Mental Status: She is alert and oriented to person, place, and time.   Psychiatric:         Mood and Affect: Mood normal.         Assessment/Plan:     1. Skin infection  - doxycycline (VIBRAMYCIN) 100 MG Tab; Take 1 Tablet by mouth 2 times a day for 7 days.  Dispense: 14 Tablet; Refill: 0    2. Pain of finger of right hand  - doxycycline (VIBRAMYCIN) 100 MG Tab; Take 1 Tablet by mouth 2 times a day for 7 days.  Dispense: 14 Tablet; Refill: 0  -Wash area with warm soapy water  -Tylenol/ibuprofen OTC as needed  -Monitor symptom and return to clinic if any worsening of symptoms    MDM/Comments:  I have prepared for this visit by personally reviewing the patient's prevous medical records, vitals, and labs including: most recent GFR of 108. Patient has stable vital signs and is non-toxic appearing.  Patient has been initiated on a course of doxycycline for suspected bacterial infection to the right index finger.  Patient previously had been diagnosed with shingles, but given presentation at this time, I am concerned for bacterial infection.  Discussed supportive care with hydration, rest, Tylenol/Ibuprofen as needed. Patient demonstrated understanding of treatment plan at this time and will RTC if symptoms worsen or fail to resolve.       Differential diagnosis, natural history, supportive care, and indications for  immediate follow-up discussed. All questions answered. Patient agrees with the plan of care.    Follow-up as needed if symptoms worsen or fail to improve to PCP, Urgent care or Emergency Room.    I have personally reviewed prior external notes and test results pertinent to today's visit.  I have independently reviewed and interpreted all diagnostics ordered during this urgent care acute visit.   Discussed management options (risks,benefits, and alternatives to treatment). Pt expresses understanding and the treatment plan was agreed upon. Questions were encouraged and answered to pt's satisfaction.    Please note that this dictation was created using voice recognition software. I have made a reasonable attempt to correct obvious errors, but I expect that there are errors of grammar and possibly content that I did not discover before finalizing the note.

## 2025-03-17 ENCOUNTER — RESULTS FOLLOW-UP (OUTPATIENT)
Dept: MEDICAL GROUP | Facility: MEDICAL CENTER | Age: 55
End: 2025-03-17

## 2025-03-17 ENCOUNTER — HOSPITAL ENCOUNTER (OUTPATIENT)
Facility: MEDICAL CENTER | Age: 55
End: 2025-03-17
Attending: NURSE PRACTITIONER
Payer: COMMERCIAL

## 2025-03-17 ENCOUNTER — OFFICE VISIT (OUTPATIENT)
Dept: MEDICAL GROUP | Facility: MEDICAL CENTER | Age: 55
End: 2025-03-17
Payer: COMMERCIAL

## 2025-03-17 VITALS
HEIGHT: 65 IN | DIASTOLIC BLOOD PRESSURE: 72 MMHG | HEART RATE: 64 BPM | RESPIRATION RATE: 14 BRPM | WEIGHT: 199 LBS | TEMPERATURE: 98 F | OXYGEN SATURATION: 96 % | BODY MASS INDEX: 33.15 KG/M2 | SYSTOLIC BLOOD PRESSURE: 118 MMHG

## 2025-03-17 DIAGNOSIS — B00.89 HERPETIC WHITLOW: ICD-10-CM

## 2025-03-17 DIAGNOSIS — B00.9 HERPETIC LESION: ICD-10-CM

## 2025-03-17 DIAGNOSIS — R59.0 AXILLARY LYMPHADENOPATHY: ICD-10-CM

## 2025-03-17 LAB
GRAM STN SPEC: NORMAL
SIGNIFICANT IND 70042: NORMAL
SITE SITE: NORMAL
SOURCE SOURCE: NORMAL

## 2025-03-17 PROCEDURE — 99213 OFFICE O/P EST LOW 20 MIN: CPT | Performed by: NURSE PRACTITIONER

## 2025-03-17 PROCEDURE — 87070 CULTURE OTHR SPECIMN AEROBIC: CPT

## 2025-03-17 PROCEDURE — 3078F DIAST BP <80 MM HG: CPT | Performed by: NURSE PRACTITIONER

## 2025-03-17 PROCEDURE — 87205 SMEAR GRAM STAIN: CPT

## 2025-03-17 PROCEDURE — 87798 DETECT AGENT NOS DNA AMP: CPT

## 2025-03-17 PROCEDURE — 87076 CULTURE ANAEROBE IDENT EACH: CPT

## 2025-03-17 PROCEDURE — 87529 HSV DNA AMP PROBE: CPT

## 2025-03-17 PROCEDURE — 3074F SYST BP LT 130 MM HG: CPT | Performed by: NURSE PRACTITIONER

## 2025-03-17 RX ORDER — VALACYCLOVIR HYDROCHLORIDE 1 G/1
1000 TABLET, FILM COATED ORAL 2 TIMES DAILY
Qty: 28 TABLET | Refills: 0 | Status: SHIPPED
Start: 2025-03-17 | End: 2025-03-31

## 2025-03-17 RX ORDER — CEPHALEXIN 500 MG/1
500 CAPSULE ORAL 3 TIMES DAILY
Qty: 30 CAPSULE | Refills: 0 | Status: SHIPPED | OUTPATIENT
Start: 2025-03-17 | End: 2025-03-17

## 2025-03-17 RX ORDER — VALACYCLOVIR HYDROCHLORIDE 1 G/1
1000 TABLET, FILM COATED ORAL 2 TIMES DAILY
Qty: 28 TABLET | Refills: 0 | Status: SHIPPED | OUTPATIENT
Start: 2025-03-17 | End: 2025-03-17

## 2025-03-17 RX ORDER — SULFAMETHOXAZOLE AND TRIMETHOPRIM 800; 160 MG/1; MG/1
1 TABLET ORAL 2 TIMES DAILY
Qty: 20 TABLET | Refills: 0 | Status: SHIPPED | OUTPATIENT
Start: 2025-03-17 | End: 2025-03-27

## 2025-03-17 ASSESSMENT — FIBROSIS 4 INDEX: FIB4 SCORE: 0.41

## 2025-03-17 NOTE — PROGRESS NOTES
Subjective:     Alis Gonzalez is a 54 y.o. female presents to discuss:     Chief Complaint   Patient presents with    Follow-Up     Finger issue Rt index finger      Verbal consent was acquired by the patient to use [employer] ambient listening note generation during this visit Yes   History of Present Illness  The patient presents for evaluation of a finger lesion.    Patient presents today to follow-up on a finger lesion.  Patient recently seen at urgent care after exacerbation of a lesion on her right index finger.  Initially thought to be zoster and was prescribed Valtrex however returned to urgent care due to worsening symptoms and was thought to have an infection versus zoster.  She stopped Valtrex and started on doxycycline.  Patient presents today as symptoms continue.  She is now reporting pain radiating up to her right axillary region.  She feels a sense of fullness in her axillary region.  The lesion appears to be slightly improving but remains tender and swollen, with a noted darkening in color. She expresses concern about the possibility of the lesion rupturing. She has been documenting the progression of the lesion through photographs. She maintains good hand hygiene. She questions whether the lesion could be a staph infection or MRSA. She also reports difficulty sleeping due to the pain. She reports no fevers.       ROS: : see above      Current Outpatient Medications:     valacyclovir (VALTREX) 1 GM Tab, Take 1 Tablet by mouth 2 times a day for 14 days., Disp: 28 Tablet, Rfl: 0    sulfamethoxazole-trimethoprim (BACTRIM DS) 800-160 MG tablet, Take 1 Tablet by mouth 2 times a day for 10 days., Disp: 20 Tablet, Rfl: 0    gabapentin (NEURONTIN) 100 MG Cap, Take 1 Capsule by mouth 2 times a day as needed (arm pain)., Disp: 30 Capsule, Rfl: 0    levothyroxine (SYNTHROID) 75 MCG Tab, Take 1 Tablet by mouth every morning on an empty stomach., Disp: 90 Tablet, Rfl: 3    Semaglutide, 2 MG/DOSE, (OZEMPIC, 2  "MG/DOSE,) 8 MG/3ML Solution Pen-injector, Inject 2 mg under the skin every 7 days., Disp: 3 mL, Rfl: 3    diazePAM (VALIUM) 5 MG Tab, Take 1 Tablet by mouth at bedtime as needed for Anxiety or Sleep for up to 30 days., Disp: 30 Tablet, Rfl: 0    NON SPECIFIED, Minoxidil 10% with latanaprost 0.01% with finasteride 0.1% with biotin 0.2%, apply twice a day to scalp, Disp: 30 Each, Rfl: 3    lisinopril (PRINIVIL) 20 MG Tab, TAKE 1 TABLET BY MOUTH EVERY DAY, Disp: 90 Tablet, Rfl: 2    Semaglutide, 1 MG/DOSE, (OZEMPIC, 1 MG/DOSE,) 4 MG/3ML Solution Pen-injector, Inject 1 mg under the skin every 7 days., Disp: 9 mL, Rfl: 1    citalopram (CELEXA) 10 MG tablet, Take 1 Tablet by mouth at bedtime. (Patient not taking: Reported on 3/15/2025), Disp: 90 Tablet, Rfl: 3    vitamin D2, Ergocalciferol, (DRISDOL) 1.25 MG (42174 UT) Cap capsule, Take 1 Capsule by mouth every 7 days., Disp: 12 Capsule, Rfl: 1    triamcinolone (KENALOG) 0.025 % ointment, , Disp: , Rfl:     albuterol (ACCUNEB) 0.63 MG/3ML nebulizer solution, Take 3 mL by nebulization every four hours as needed for Shortness of Breath. (Patient not taking: Reported on 3/15/2025), Disp: 3 mL, Rfl: 1    DUPIXENT 300 MG/2ML Solution Prefilled Syringe injection, , Disp: , Rfl:     Allergies   Allergen Reactions    Azithromycin Rash     All over body    Ciprofloxacin Rash     All over body       Objective:   Vitals: /72   Pulse 64   Temp 36.7 °C (98 °F) (Temporal)   Resp 14   Ht 1.651 m (5' 5\")   Wt 90.3 kg (199 lb)   SpO2 96%   BMI 33.12 kg/m²    General: Alert, pleasant, NAD  HEENT: Normocephalic.  Neck supple.   Respiratory: no distress, no audible wheezing, RR -WNL  Skin: Warm, dry, right finger dorsal aspect with erythema, edema, swelling, discoloration.  Extremities: No leg edema. No discoloration  Neurological: No tremors  Psych:  Affect/mood is normal, judgement is good, memory is intact, grooming is appropriate.  Assessment/Plan:      1. Herpetic " td  - valacyclovir (VALTREX) 1 GM Tab; Take 1 Tablet by mouth 2 times a day for 14 days.  Dispense: 28 Tablet; Refill: 0  - HERPES VIRAL CULTURE  - CULTURE WOUND W/ GRAM STAIN; Future  - sulfamethoxazole-trimethoprim (BACTRIM DS) 800-160 MG tablet; Take 1 Tablet by mouth 2 times a day for 10 days.  Dispense: 20 Tablet; Refill: 0    2. Herpetic lesion  - valacyclovir (VALTREX) 1 GM Tab; Take 1 Tablet by mouth 2 times a day for 14 days.  Dispense: 28 Tablet; Refill: 0  - HERPES VIRAL CULTURE  - CULTURE WOUND W/ GRAM STAIN; Future  - sulfamethoxazole-trimethoprim (BACTRIM DS) 800-160 MG tablet; Take 1 Tablet by mouth 2 times a day for 10 days.  Dispense: 20 Tablet; Refill: 0    3. Axillary lymphadenopathy     Assessment & Plan  1. Lesion on the finger.  The differential diagnosis includes herpetic lesion-such as herpetic td given clinical presentation as well as associated symptoms of axillary lymphadenopathy, MRSA, or a spider bite. The lesion does not appear to be a classic presentation of shingles given presentation.  Lesion is not amenable to incision and drainage.  There is some fluctuance noted.  Strongly appears to be herpetic td given her pain that radiates up her arm and initial presentation of vesicles.     2.  Axillary lymphadenopathy  Most likely in the setting of herpetic td which discussed with patient symptoms of axillary tenderness, radiating pain following dermatome up patient's arm.  No systemic symptoms at this time.  Recommend restarting on Valtrex.  Stop doxycycline and we can short course of Bactrim for precaution regarding potential for secondary infection given clinical presentation.    Have obtained viral swab and culture today-for further diagnostic evaluation.    Return if symptoms worsen or fail to improve.    {I have placed the above orders and discussed them with an approved delegating provider. The MA is performing the below orders under the direction of   Martín ROSAS    Health maintenance:    General Recommendations:   Smoking: recommend complete avoidance of all tobacco products  Alcohol: recommend limiting consumption  Physical Activity: goal is 30 min of moderate activity 5 times a week  Weight Management and Nutrition: high vegetable, high protein diet like the Mediterranean diet, portion control, avoid excessive sugars, Low Glycemic Index foods, adequate hydration, sleep.

## 2025-03-20 LAB
BACTERIA WND AEROBE CULT: NORMAL
GRAM STN SPEC: NORMAL
SIGNIFICANT IND 70042: NORMAL
SITE SITE: NORMAL
SOURCE SOURCE: NORMAL
SPECIMEN SOURCE: NORMAL
VZV DNA SPEC QL NAA+PROBE: NOT DETECTED

## 2025-03-21 LAB
HSV1 DNA CSF QL NAA+PROBE: DETECTED
HSV2 DNA CSF QL NAA+PROBE: NOT DETECTED
SPECIMEN SOURCE: ABNORMAL

## 2025-03-22 ENCOUNTER — RESULTS FOLLOW-UP (OUTPATIENT)
Dept: MEDICAL GROUP | Facility: MEDICAL CENTER | Age: 55
End: 2025-03-22

## 2025-04-08 ENCOUNTER — OFFICE VISIT (OUTPATIENT)
Dept: URGENT CARE | Facility: PHYSICIAN GROUP | Age: 55
End: 2025-04-08
Payer: COMMERCIAL

## 2025-04-08 ENCOUNTER — HOSPITAL ENCOUNTER (OUTPATIENT)
Facility: MEDICAL CENTER | Age: 55
End: 2025-04-08
Attending: PHYSICIAN ASSISTANT
Payer: COMMERCIAL

## 2025-04-08 VITALS
SYSTOLIC BLOOD PRESSURE: 138 MMHG | HEIGHT: 65 IN | BODY MASS INDEX: 33.43 KG/M2 | OXYGEN SATURATION: 95 % | RESPIRATION RATE: 19 BRPM | HEART RATE: 91 BPM | TEMPERATURE: 97.9 F | DIASTOLIC BLOOD PRESSURE: 90 MMHG | WEIGHT: 200.62 LBS

## 2025-04-08 DIAGNOSIS — N30.01 ACUTE CYSTITIS WITH HEMATURIA: ICD-10-CM

## 2025-04-08 DIAGNOSIS — R30.9 PAINFUL URINATION: ICD-10-CM

## 2025-04-08 LAB
APPEARANCE UR: NORMAL
BILIRUB UR STRIP-MCNC: NORMAL MG/DL
COLOR UR AUTO: YELLOW
GLUCOSE UR STRIP.AUTO-MCNC: NORMAL MG/DL
KETONES UR STRIP.AUTO-MCNC: NORMAL MG/DL
LEUKOCYTE ESTERASE UR QL STRIP.AUTO: NORMAL
NITRITE UR QL STRIP.AUTO: NORMAL
PH UR STRIP.AUTO: 6.5 [PH] (ref 5–8)
PROT UR QL STRIP: 100 MG/DL
RBC UR QL AUTO: NORMAL
SP GR UR STRIP.AUTO: 1.02
UROBILINOGEN UR STRIP-MCNC: 0.2 MG/DL

## 2025-04-08 PROCEDURE — 87186 SC STD MICRODIL/AGAR DIL: CPT

## 2025-04-08 PROCEDURE — 87077 CULTURE AEROBIC IDENTIFY: CPT

## 2025-04-08 PROCEDURE — 3080F DIAST BP >= 90 MM HG: CPT | Performed by: PHYSICIAN ASSISTANT

## 2025-04-08 PROCEDURE — 99213 OFFICE O/P EST LOW 20 MIN: CPT | Performed by: PHYSICIAN ASSISTANT

## 2025-04-08 PROCEDURE — 81002 URINALYSIS NONAUTO W/O SCOPE: CPT | Performed by: PHYSICIAN ASSISTANT

## 2025-04-08 PROCEDURE — 3075F SYST BP GE 130 - 139MM HG: CPT | Performed by: PHYSICIAN ASSISTANT

## 2025-04-08 PROCEDURE — 87086 URINE CULTURE/COLONY COUNT: CPT

## 2025-04-08 RX ORDER — PHENAZOPYRIDINE HYDROCHLORIDE 200 MG/1
200 TABLET, FILM COATED ORAL 3 TIMES DAILY PRN
Qty: 6 TABLET | Refills: 0 | Status: SHIPPED | OUTPATIENT
Start: 2025-04-08

## 2025-04-08 RX ORDER — SULFAMETHOXAZOLE AND TRIMETHOPRIM 800; 160 MG/1; MG/1
1 TABLET ORAL EVERY 12 HOURS
Qty: 10 TABLET | Refills: 0 | Status: SHIPPED | OUTPATIENT
Start: 2025-04-08 | End: 2025-04-13

## 2025-04-08 ASSESSMENT — ENCOUNTER SYMPTOMS
FEVER: 0
NAUSEA: 0
CHILLS: 0
FLANK PAIN: 0
VOMITING: 0
ABDOMINAL PAIN: 1

## 2025-04-08 ASSESSMENT — FIBROSIS 4 INDEX: FIB4 SCORE: 0.41

## 2025-04-09 NOTE — PROGRESS NOTES
Subjective     Alis Gonzalez is a 54 y.o. female who presents with Painful Urination (Painful urination, cloudy urine x 3 days)      UTI  This is a new problem. The current episode started in the past 7 days (started 3 days ago). The problem has been rapidly worsening. Associated symptoms include abdominal pain and urinary symptoms (Cloudy urine, painful urination, urgency/frequency of urination.). Pertinent negatives include no chills, fever, nausea or vomiting. Exacerbated by: Sitting down, urinating. She has tried NSAIDs for the symptoms. The treatment provided mild relief.   Patient reports that today her discomfort is gotten so significant that she cannot even sit down because she feels significant pain in her urethra.  Has not noticed any rash or itching or abnormal vaginal discharge.    Review of Systems   Constitutional:  Negative for chills and fever.   Gastrointestinal:  Positive for abdominal pain. Negative for nausea and vomiting.   Genitourinary:  Positive for dysuria, frequency and urgency. Negative for flank pain.       PMH:  has a past medical history of Alopecia (2015), Asthma, Daytime sleepiness, Fatigue, Insomnia, and Thyroid disease.    She has no past medical history of Chills, Fever, Gasping for breath, Morning headache, Snoring, or Weight loss.  MEDS:   Current Outpatient Medications:     sulfamethoxazole-trimethoprim (BACTRIM DS) 800-160 MG tablet, Take 1 Tablet by mouth every 12 hours for 5 days., Disp: 10 Tablet, Rfl: 0    phenazopyridine (PYRIDIUM) 200 MG Tab, Take 1 Tablet by mouth 3 times a day as needed for Moderate Pain or Severe Pain., Disp: 6 Tablet, Rfl: 0    levothyroxine (SYNTHROID) 75 MCG Tab, Take 1 Tablet by mouth every morning on an empty stomach., Disp: 90 Tablet, Rfl: 3    Semaglutide, 2 MG/DOSE, (OZEMPIC, 2 MG/DOSE,) 8 MG/3ML Solution Pen-injector, Inject 2 mg under the skin every 7 days., Disp: 3 mL, Rfl: 3    NON SPECIFIED, Minoxidil 10% with latanaprost 0.01% with  "finasteride 0.1% with biotin 0.2%, apply twice a day to scalp, Disp: 30 Each, Rfl: 3    lisinopril (PRINIVIL) 20 MG Tab, TAKE 1 TABLET BY MOUTH EVERY DAY, Disp: 90 Tablet, Rfl: 2    Semaglutide, 1 MG/DOSE, (OZEMPIC, 1 MG/DOSE,) 4 MG/3ML Solution Pen-injector, Inject 1 mg under the skin every 7 days., Disp: 9 mL, Rfl: 1    DUPIXENT 300 MG/2ML Solution Prefilled Syringe injection, , Disp: , Rfl:     gabapentin (NEURONTIN) 100 MG Cap, Take 1 Capsule by mouth 2 times a day as needed (arm pain). (Patient not taking: Reported on 4/8/2025), Disp: 30 Capsule, Rfl: 0    citalopram (CELEXA) 10 MG tablet, Take 1 Tablet by mouth at bedtime. (Patient not taking: Reported on 3/15/2025), Disp: 90 Tablet, Rfl: 3    vitamin D2, Ergocalciferol, (DRISDOL) 1.25 MG (55774 UT) Cap capsule, Take 1 Capsule by mouth every 7 days., Disp: 12 Capsule, Rfl: 1    triamcinolone (KENALOG) 0.025 % ointment, , Disp: , Rfl:     albuterol (ACCUNEB) 0.63 MG/3ML nebulizer solution, Take 3 mL by nebulization every four hours as needed for Shortness of Breath. (Patient not taking: Reported on 3/13/2025), Disp: 3 mL, Rfl: 1  ALLERGIES:   Allergies   Allergen Reactions    Azithromycin Rash     All over body    Ciprofloxacin Rash     All over body     SURGHX: No past surgical history on file.  SOCHX:  reports that she has never smoked. She has never used smokeless tobacco. She reports that she does not currently use alcohol. She reports that she does not use drugs.  FH: Family history was reviewed, no pertinent findings to report        Objective     BP (!) 138/90 (BP Location: Left arm, Patient Position: Sitting, BP Cuff Size: Adult long)   Pulse 91   Temp 36.6 °C (97.9 °F) (Temporal)   Resp 19   Ht 1.651 m (5' 5\")   Wt 91 kg (200 lb 9.9 oz)   SpO2 95%   BMI 33.38 kg/m²      Physical Exam  Constitutional:       General: She is not in acute distress.     Appearance: She is not diaphoretic.   HENT:      Head: Normocephalic and atraumatic.      Right " Ear: External ear normal.      Left Ear: External ear normal.   Eyes:      Conjunctiva/sclera: Conjunctivae normal.      Pupils: Pupils are equal, round, and reactive to light.   Pulmonary:      Effort: Pulmonary effort is normal. No respiratory distress.   Abdominal:      Tenderness: There is abdominal tenderness in the suprapubic area. There is no right CVA tenderness or left CVA tenderness.   Genitourinary:     Comments: Patient declined pelvic exam  Musculoskeletal:      Cervical back: Normal range of motion.   Skin:     Findings: No rash.   Neurological:      Mental Status: She is alert and oriented to person, place, and time.   Psychiatric:         Mood and Affect: Mood and affect normal.         Cognition and Memory: Memory normal.         Judgment: Judgment normal.                   POCT Urinalysis  Lab Results   Component Value Date/Time    POCCOLOR yellow 04/08/2025 06:11 PM    POCAPPEAR cloudy 04/08/2025 06:11 PM    POCLEUKEST small 04/08/2025 06:11 PM    POCNITRITE neg 04/08/2025 06:11 PM    POCUROBILIGE 0.2 04/08/2025 06:11 PM    POCPROTEIN 100 04/08/2025 06:11 PM    POCURPH 6.5 04/08/2025 06:11 PM    POCBLOOD large 04/08/2025 06:11 PM    POCSPGRV 1.025 04/08/2025 06:11 PM    POCKETONES neg 04/08/2025 06:11 PM    POCBILIRUBIN neg 04/08/2025 06:11 PM    POCGLUCUA neg 04/08/2025 06:11 PM          Assessment & Plan     1. Painful urination  - POCT Urinalysis    2. Acute cystitis with hematuria  - Urine Culture; Future  - sulfamethoxazole-trimethoprim (BACTRIM DS) 800-160 MG tablet; Take 1 Tablet by mouth every 12 hours for 5 days.  Dispense: 10 Tablet; Refill: 0  - phenazopyridine (PYRIDIUM) 200 MG Tab; Take 1 Tablet by mouth 3 times a day as needed for Moderate Pain or Severe Pain.  Dispense: 6 Tablet; Refill: 0  Urinalysis and some of the symptoms are consistent with bladder infection.  She is also describing really significant discomfort in her urethra that is preventing her from being able to sit down  comfortably.  Did recommend doing an exam to rule out anything more concerning with the urethra to include swelling, lesion, prolapse.  Patient declined.  She does report that she will return or follow-up with her primary care provider if that symptom does not start to resolve within the next 48 hours.          Differential Diagnosis, natural history, and supportive care discussed. Return to the Urgent Care or follow up with your PCP if symptoms fail to resolve, or for any new or worsening symptoms. Emergency room precautions discussed. Patient and/or family appears understanding of information.

## 2025-04-10 DIAGNOSIS — N30.01 ACUTE CYSTITIS WITH HEMATURIA: ICD-10-CM

## 2025-04-12 ENCOUNTER — RESULTS FOLLOW-UP (OUTPATIENT)
Dept: URGENT CARE | Facility: CLINIC | Age: 55
End: 2025-04-12
Payer: COMMERCIAL

## 2025-04-12 LAB
BACTERIA UR CULT: ABNORMAL
BACTERIA UR CULT: ABNORMAL
SIGNIFICANT IND 70042: ABNORMAL
SITE SITE: ABNORMAL
SOURCE SOURCE: ABNORMAL

## 2025-05-05 DIAGNOSIS — G47.00 INSOMNIA, UNSPECIFIED TYPE: ICD-10-CM

## 2025-05-05 DIAGNOSIS — F41.9 ANXIETY: ICD-10-CM

## 2025-05-05 RX ORDER — DIAZEPAM 5 MG/1
5 TABLET ORAL NIGHTLY PRN
Qty: 30 TABLET | Refills: 0 | Status: SHIPPED | OUTPATIENT
Start: 2025-05-05 | End: 2025-06-04

## 2025-05-12 DIAGNOSIS — R19.5 CHANGE IN STOOL CALIBER: ICD-10-CM

## 2025-05-12 DIAGNOSIS — R14.0 ABDOMINAL BLOATING: ICD-10-CM

## 2025-05-12 DIAGNOSIS — R19.8 RECTAL FULLNESS: ICD-10-CM

## 2025-05-12 DIAGNOSIS — R19.8 ABDOMINAL COMPLAINTS: ICD-10-CM

## 2025-05-21 ENCOUNTER — HOSPITAL ENCOUNTER (OUTPATIENT)
Dept: LAB | Facility: MEDICAL CENTER | Age: 55
End: 2025-05-21
Attending: NURSE PRACTITIONER
Payer: COMMERCIAL

## 2025-05-21 DIAGNOSIS — I10 ESSENTIAL HYPERTENSION: Chronic | ICD-10-CM

## 2025-05-21 DIAGNOSIS — E11.65 TYPE 2 DIABETES MELLITUS WITH HYPERGLYCEMIA, WITHOUT LONG-TERM CURRENT USE OF INSULIN (HCC): ICD-10-CM

## 2025-05-21 DIAGNOSIS — E03.8 OTHER SPECIFIED HYPOTHYROIDISM: ICD-10-CM

## 2025-05-21 DIAGNOSIS — E55.9 VITAMIN D INSUFFICIENCY: ICD-10-CM

## 2025-05-21 DIAGNOSIS — E78.5 DYSLIPIDEMIA: Chronic | ICD-10-CM

## 2025-05-21 DIAGNOSIS — D72.10 EOSINOPHILIA, UNSPECIFIED TYPE: Chronic | ICD-10-CM

## 2025-05-21 LAB
25(OH)D3 SERPL-MCNC: 31 NG/ML (ref 30–100)
ALBUMIN SERPL BCP-MCNC: 4.3 G/DL (ref 3.2–4.9)
ALBUMIN/GLOB SERPL: 1.4 G/DL
ALP SERPL-CCNC: 78 U/L (ref 30–99)
ALT SERPL-CCNC: 61 U/L (ref 2–50)
ANION GAP SERPL CALC-SCNC: 12 MMOL/L (ref 7–16)
AST SERPL-CCNC: 56 U/L (ref 12–45)
BILIRUB SERPL-MCNC: 0.5 MG/DL (ref 0.1–1.5)
BUN SERPL-MCNC: 12 MG/DL (ref 8–22)
CALCIUM ALBUM COR SERPL-MCNC: 8.6 MG/DL (ref 8.5–10.5)
CALCIUM SERPL-MCNC: 8.8 MG/DL (ref 8.5–10.5)
CHLORIDE SERPL-SCNC: 103 MMOL/L (ref 96–112)
CHOLEST SERPL-MCNC: 179 MG/DL (ref 100–199)
CO2 SERPL-SCNC: 22 MMOL/L (ref 20–33)
CREAT SERPL-MCNC: 0.59 MG/DL (ref 0.5–1.4)
CRP SERPL HS-MCNC: 0.69 MG/DL (ref 0–0.75)
ERYTHROCYTE [DISTWIDTH] IN BLOOD BY AUTOMATED COUNT: 41 FL (ref 35.9–50)
FASTING STATUS PATIENT QL REPORTED: NORMAL
GFR SERPLBLD CREATININE-BSD FMLA CKD-EPI: 105 ML/MIN/1.73 M 2
GLOBULIN SER CALC-MCNC: 3.1 G/DL (ref 1.9–3.5)
GLUCOSE SERPL-MCNC: 122 MG/DL (ref 65–99)
HCT VFR BLD AUTO: 46.1 % (ref 37–47)
HDLC SERPL-MCNC: 40 MG/DL
HGB BLD-MCNC: 15.7 G/DL (ref 12–16)
LDLC SERPL CALC-MCNC: 121 MG/DL
MCH RBC QN AUTO: 30.8 PG (ref 27–33)
MCHC RBC AUTO-ENTMCNC: 34.1 G/DL (ref 32.2–35.5)
MCV RBC AUTO: 90.6 FL (ref 81.4–97.8)
PLATELET # BLD AUTO: 449 K/UL (ref 164–446)
PMV BLD AUTO: 9.3 FL (ref 9–12.9)
POTASSIUM SERPL-SCNC: 4.3 MMOL/L (ref 3.6–5.5)
PROT SERPL-MCNC: 7.4 G/DL (ref 6–8.2)
RBC # BLD AUTO: 5.09 M/UL (ref 4.2–5.4)
SODIUM SERPL-SCNC: 137 MMOL/L (ref 135–145)
T4 FREE SERPL-MCNC: 0.89 NG/DL (ref 0.93–1.7)
TRIGL SERPL-MCNC: 90 MG/DL (ref 0–149)
TSH SERPL-ACNC: 1.67 UIU/ML (ref 0.38–5.33)
WBC # BLD AUTO: 8.1 K/UL (ref 4.8–10.8)

## 2025-05-21 PROCEDURE — 84439 ASSAY OF FREE THYROXINE: CPT

## 2025-05-21 PROCEDURE — 83036 HEMOGLOBIN GLYCOSYLATED A1C: CPT

## 2025-05-21 PROCEDURE — 85027 COMPLETE CBC AUTOMATED: CPT

## 2025-05-21 PROCEDURE — 82306 VITAMIN D 25 HYDROXY: CPT

## 2025-05-21 PROCEDURE — 82570 ASSAY OF URINE CREATININE: CPT

## 2025-05-21 PROCEDURE — 86140 C-REACTIVE PROTEIN: CPT

## 2025-05-21 PROCEDURE — 80061 LIPID PANEL: CPT

## 2025-05-21 PROCEDURE — 80053 COMPREHEN METABOLIC PANEL: CPT

## 2025-05-21 PROCEDURE — 82043 UR ALBUMIN QUANTITATIVE: CPT

## 2025-05-21 PROCEDURE — 84443 ASSAY THYROID STIM HORMONE: CPT

## 2025-05-21 PROCEDURE — 36415 COLL VENOUS BLD VENIPUNCTURE: CPT

## 2025-05-22 LAB
CREAT UR-MCNC: 168 MG/DL
EST. AVERAGE GLUCOSE BLD GHB EST-MCNC: 169 MG/DL
HBA1C MFR BLD: 7.5 % (ref 4–5.6)
MICROALBUMIN UR-MCNC: <1.2 MG/DL
MICROALBUMIN/CREAT UR: NORMAL MG/G (ref 0–30)

## 2025-05-29 DIAGNOSIS — L65.9 HAIR LOSS: ICD-10-CM

## 2025-05-29 NOTE — TELEPHONE ENCOUNTER
Received request via: Patient    Was the patient seen in the last year in this department? Yes    Does the patient have an active prescription (recently filled or refills available) for medication(s) requested? No    Pharmacy Name: [facility]

## 2025-05-30 ENCOUNTER — TELEPHONE (OUTPATIENT)
Dept: DERMATOLOGY | Facility: IMAGING CENTER | Age: 55
End: 2025-05-30
Payer: COMMERCIAL

## 2025-06-02 DIAGNOSIS — L65.9 HAIR LOSS: ICD-10-CM

## 2025-06-02 NOTE — TELEPHONE ENCOUNTER
Faxed to [facility], called to [facility] and they stating it was not received yet, verbally called in.... left vm for pt since she had been calling  to make sure it was faxed.

## 2025-06-05 ENCOUNTER — APPOINTMENT (OUTPATIENT)
Dept: MEDICAL GROUP | Facility: MEDICAL CENTER | Age: 55
End: 2025-06-05
Payer: COMMERCIAL

## 2025-06-05 VITALS
RESPIRATION RATE: 14 BRPM | DIASTOLIC BLOOD PRESSURE: 74 MMHG | HEART RATE: 66 BPM | TEMPERATURE: 97.2 F | HEIGHT: 65 IN | WEIGHT: 199 LBS | OXYGEN SATURATION: 96 % | SYSTOLIC BLOOD PRESSURE: 124 MMHG | BODY MASS INDEX: 33.15 KG/M2

## 2025-06-05 DIAGNOSIS — E03.8 OTHER SPECIFIED HYPOTHYROIDISM: ICD-10-CM

## 2025-06-05 DIAGNOSIS — R79.89 ELEVATED LFTS: ICD-10-CM

## 2025-06-05 DIAGNOSIS — E11.65 TYPE 2 DIABETES MELLITUS WITH HYPERGLYCEMIA, WITHOUT LONG-TERM CURRENT USE OF INSULIN (HCC): Primary | ICD-10-CM

## 2025-06-05 DIAGNOSIS — E66.9 OBESITY (BMI 30-39.9): Chronic | ICD-10-CM

## 2025-06-05 DIAGNOSIS — M67.449 MUCOUS CYST OF DIGIT OF HAND: ICD-10-CM

## 2025-06-05 PROCEDURE — 99214 OFFICE O/P EST MOD 30 MIN: CPT | Performed by: NURSE PRACTITIONER

## 2025-06-05 PROCEDURE — 3078F DIAST BP <80 MM HG: CPT | Performed by: NURSE PRACTITIONER

## 2025-06-05 PROCEDURE — 3074F SYST BP LT 130 MM HG: CPT | Performed by: NURSE PRACTITIONER

## 2025-06-05 RX ORDER — TIRZEPATIDE 2.5 MG/.5ML
0.5 INJECTION, SOLUTION SUBCUTANEOUS
Qty: 2 ML | Refills: 0 | Status: SHIPPED | OUTPATIENT
Start: 2025-06-05 | End: 2025-07-05

## 2025-06-05 RX ORDER — TIRZEPATIDE 5 MG/.5ML
0.5 INJECTION, SOLUTION SUBCUTANEOUS
Qty: 6 ML | Refills: 1 | Status: SHIPPED | OUTPATIENT
Start: 2025-06-05

## 2025-06-05 ASSESSMENT — FIBROSIS 4 INDEX: FIB4 SCORE: 0.89

## 2025-06-05 NOTE — PROGRESS NOTES
Subjective:     Alis Gonzalez is a 54 y.o. female presents to discuss:     Chief Complaint   Patient presents with    Follow-Up     Discuss a few things     Verbal consent was acquired by the patient to use "[employer]ot ambient listening note generation during this visit Yes     Follow up   Last OV 3/2025 -herpetic td on finger  Review of recent labs  Lipid panel -improved  Mild LFT's  Fasting glucose 122  A1c 7.5%  Thyroid slight low FT4-NL TSH  History of Present Illness  The patient presents for evaluation of elevated liver enzymes, digital mucous cyst, thyroid management, and weight management.    She has been experiencing a persistent issue with her finger for the past 6 to 9 months, initially attributing it to arthritis. The condition has progressively worsened, with the development of a seeping sensation upon pressure application. She describes the exudate as clear and stringy, and notes that the area becomes sore when subjected to hard pressure. Despite attempts to alleviate the issue by popping the affected area and extracting the exudate, the condition has not improved.     She reports concerns regarding her liver enzymes. She has abstained from alcohol and Tylenol during this time. She has a history of fatty liver disease but reports no recent viral infections. She retains her gallbladder and is not experiencing any current pain.    Her weight has remained stable at approximately 200 pounds for the past 5 years. She experienced a weight loss of 10 pounds upon initiation of Ozempic but has since regained the weight. She discontinued Ozempic last year due to insurance coverage issues and has not resumed it. She has previously tried metformin without success- d/t intolerance.    She is currently on a regimen of thyroid medication at a dose of 75 mcg. She expresses a desire to discontinue this medication and admits to inconsistent adherence to the prescribed regimen. However, she has recently committed  "to taking the medication 30 to 40 minutes prior to meals.    She takes Valium half tablet every 3 days for sleep.    SOCIAL HISTORY  She does not drink or smoke.    Planning upcoming travel to [place]    ROS: : see above    Current Medications[1]    Allergies[2]    Objective:   Vitals: /74   Pulse 66   Temp 36.2 °C (97.2 °F) (Temporal)   Resp 14   Ht 1.651 m (5' 5\")   Wt 90.3 kg (199 lb)   SpO2 96%   BMI 33.12 kg/m²    General: Alert, pleasant, NAD  HEENT: Normocephalic.  Neck supple.   Respiratory: no distress, no audible wheezing, RR -WNL  Skin: Warm, dry, no rashes.  Extremities: No leg edema. No discoloration. Index finger with fluid filled cysts on dorsal aspect of DIP-dry flaky  Neurological: No tremors  Psych:  Affect/mood is normal, judgement is good, memory is intact, grooming is appropriate.  Results  Labs   - Free T4: Slightly low   - TSH: Normal   - Liver enzymes: slightly Elevated   - A1c: 7.5     Assessment/Plan:      1. Type 2 diabetes mellitus with hyperglycemia, without long-term current use of insulin (HCC)  - Tirzepatide (MOUNJARO) 2.5 MG/0.5ML Solution Auto-injector; Inject 0.5 mL under the skin every 7 days for 30 days. THEN START ON NEW RX FOR THE 5MG EVERY 7 DAYS AFTER COMPLETING 1 MONTH OF THE STARTING DOSE  Dispense: 2 mL; Refill: 0  - Tirzepatide (MOUNJARO) 5 MG/0.5ML Solution Auto-injector; Inject 0.5 mL under the skin every 7 days.  Dispense: 6 mL; Refill: 1    2. Elevated LFTs  - US-RUQ; Future  - Comp Metabolic Panel; Future    3. Other specified hypothyroidism  - TSH; Future  - FREE THYROXINE; Future    4. Mucous cyst of digit of hand    5. Obesity (BMI 30-39.9)     Assessment & Plan  Digital mucous cyst  The condition appears to be a benign digital mucous cyst.  Treatment plan: She has been advised against popping the cyst to prevent potential infection. Additionally, she may consider applying a tight Band-Aid to see if it helps reduce the cyst.  Clinical decision " making: Orthopedic consultation for possible drainage has been suggested.    Elevated liver enzymes  The elevated liver enzymes could be attributed to various factors such as medication, acute viral symptoms, alcohol, Tylenol, gallbladder issues, or fatty liver.  Diagnostic plan: A liver ultrasound will be ordered for further evaluation. Monitoring of liver enzyme trends will continue to assess any changes.  Clinical decision making: Her bilirubin levels are within normal range, and she is not exhibiting any signs of jaundice or vomiting.    Thyroid management  Her TSH levels have remained stable.  Most recent TSH 1.670, free T40.89.  Patient like to defer any increase in her current thyroid medication at this time-due to possible discrepancy due to reported missed doses.  Plan to recheck 3 months.  Treatment plan: She will continue her current thyroid medication regimen.  Clinical decision making: Discussion about the importance of consistent medication intake was conducted.    Diabetes  Not well controlled  Was previously on Ozempic however was not continued to insurance issues.   She has intolerance to Metformin d/t GI side effects.   Needs to optimize glucose control  - Recommend starting on Mounjaro 2.5 mg weekly for 30 days then increasing up to the 5 mg weekly dose thereafter to help reduce further worsening of A1c.  Lab Results   Component Value Date/Time    HBA1C 7.5 (H) 05/21/2025 1051    HBA1C 7.3 (H) 12/11/2024 0930    HBA1C 6.6 (H) 06/21/2024 1057    HBA1C 8.0 (H) 03/12/2024 1418        Her weight gain could be due to metabolic dysregulation, diabetes, or postmenopausal status.  Treatment plan: A prescription for Mounjaro 2.5 mg will be provided    Follow-up: A recheck of her thyroid function will be scheduled in 3 months.    Return in about 3 months (around 9/5/2025) for Lab results.    Please note that this dictation was created using voice recognition software. I have made every reasonable attempt to  correct obvious errors, but I expect that there are errors of grammar and possibly content that I did not discover before finalizing the note.     Comfort ROSAS    Health maintenance:    General Recommendations:   Smoking: recommend complete avoidance of all tobacco products  Alcohol: recommend limiting consumption  Physical Activity: goal is 30 min of moderate activity 5 times a week  Weight Management and Nutrition: high vegetable, high protein diet like the Mediterranean diet, portion control, avoid excessive sugars, Low Glycemic Index foods, adequate hydration, sleep.          [1]   Current Outpatient Medications:     Tirzepatide (MOUNJARO) 2.5 MG/0.5ML Solution Auto-injector, Inject 0.5 mL under the skin every 7 days for 30 days. THEN START ON NEW RX FOR THE 5MG EVERY 7 DAYS AFTER COMPLETING 1 MONTH OF THE STARTING DOSE, Disp: 2 mL, Rfl: 0    Tirzepatide (MOUNJARO) 5 MG/0.5ML Solution Auto-injector, Inject 0.5 mL under the skin every 7 days., Disp: 6 mL, Rfl: 1    NON SPECIFIED, Minoxidil 10% with latanaprost 0.01% with finasteride 0.1% with biotin 0.2%, apply twice a day to scalp, Disp: 30 Each, Rfl: 3    phenazopyridine (PYRIDIUM) 200 MG Tab, Take 1 Tablet by mouth 3 times a day as needed for Moderate Pain or Severe Pain., Disp: 6 Tablet, Rfl: 0    gabapentin (NEURONTIN) 100 MG Cap, Take 1 Capsule by mouth 2 times a day as needed (arm pain). (Patient not taking: Reported on 4/8/2025), Disp: 30 Capsule, Rfl: 0    levothyroxine (SYNTHROID) 75 MCG Tab, Take 1 Tablet by mouth every morning on an empty stomach., Disp: 90 Tablet, Rfl: 3    lisinopril (PRINIVIL) 20 MG Tab, TAKE 1 TABLET BY MOUTH EVERY DAY, Disp: 90 Tablet, Rfl: 2    albuterol (ACCUNEB) 0.63 MG/3ML nebulizer solution, Take 3 mL by nebulization every four hours as needed for Shortness of Breath. (Patient not taking: Reported on 3/13/2025), Disp: 3 mL, Rfl: 1    DUPIXENT 300 MG/2ML Solution Prefilled Syringe injection, , Disp: , Rfl:    [2]   Allergies  Allergen Reactions    Azithromycin Rash     All over body    Ciprofloxacin Rash     All over body

## 2025-06-05 NOTE — Clinical Note
Please start PA for Mounjaro A1c 7.%% Intolerance to Metformin.  Has been on Ozempic without significant improvement of A1c.

## 2025-07-05 ENCOUNTER — HOSPITAL ENCOUNTER (OUTPATIENT)
Dept: RADIOLOGY | Facility: MEDICAL CENTER | Age: 55
End: 2025-07-05
Attending: NURSE PRACTITIONER
Payer: COMMERCIAL

## 2025-07-05 DIAGNOSIS — R79.89 ELEVATED LFTS: ICD-10-CM

## 2025-07-05 PROCEDURE — 76705 ECHO EXAM OF ABDOMEN: CPT

## 2025-07-10 ENCOUNTER — APPOINTMENT (OUTPATIENT)
Dept: MEDICAL GROUP | Facility: MEDICAL CENTER | Age: 55
End: 2025-07-10
Payer: COMMERCIAL

## 2025-07-11 DIAGNOSIS — E11.65 TYPE 2 DIABETES MELLITUS WITH HYPERGLYCEMIA, WITHOUT LONG-TERM CURRENT USE OF INSULIN (HCC): ICD-10-CM

## 2025-07-12 RX ORDER — TIRZEPATIDE 5 MG/.5ML
0.5 INJECTION, SOLUTION SUBCUTANEOUS
Qty: 6 ML | Refills: 1 | Status: SHIPPED | OUTPATIENT
Start: 2025-07-12

## 2025-07-17 DIAGNOSIS — F41.9 ANXIETY: ICD-10-CM

## 2025-07-17 DIAGNOSIS — G47.00 INSOMNIA, UNSPECIFIED TYPE: ICD-10-CM

## 2025-07-19 RX ORDER — DIAZEPAM 5 MG/1
5 TABLET ORAL NIGHTLY PRN
Qty: 30 TABLET | Refills: 0 | Status: SHIPPED | OUTPATIENT
Start: 2025-07-19 | End: 2025-08-18

## 2025-07-20 ENCOUNTER — APPOINTMENT (OUTPATIENT)
Dept: RADIOLOGY | Facility: MEDICAL CENTER | Age: 55
End: 2025-07-20
Attending: NURSE PRACTITIONER
Payer: COMMERCIAL